# Patient Record
Sex: MALE | Race: WHITE | Employment: FULL TIME | URBAN - METROPOLITAN AREA
[De-identification: names, ages, dates, MRNs, and addresses within clinical notes are randomized per-mention and may not be internally consistent; named-entity substitution may affect disease eponyms.]

---

## 2017-09-14 PROBLEM — I10 POORLY-CONTROLLED HYPERTENSION: Status: ACTIVE | Noted: 2017-09-14

## 2017-09-15 PROBLEM — L03.119 CELLULITIS OF FOOT: Status: ACTIVE | Noted: 2017-09-15

## 2017-09-20 PROBLEM — Z72.0 TOBACCO ABUSE: Status: ACTIVE | Noted: 2017-09-20

## 2017-09-20 PROBLEM — E66.9 OBESITY, UNSPECIFIED: Status: ACTIVE | Noted: 2017-09-20

## 2017-11-07 PROBLEM — I10 ESSENTIAL HYPERTENSION: Status: ACTIVE | Noted: 2017-11-07

## 2018-01-03 PROBLEM — L03.119 CELLULITIS OF FOOT: Status: RESOLVED | Noted: 2017-09-15 | Resolved: 2018-01-03

## 2018-09-05 ENCOUNTER — HOSPITAL ENCOUNTER (INPATIENT)
Age: 58
LOS: 6 days | Discharge: HOME | DRG: 872 | End: 2018-09-11
Attending: EMERGENCY MEDICINE | Admitting: INTERNAL MEDICINE
Payer: COMMERCIAL

## 2018-09-05 ENCOUNTER — APPOINTMENT (OUTPATIENT)
Dept: GENERAL RADIOLOGY | Age: 58
DRG: 872 | End: 2018-09-05
Attending: EMERGENCY MEDICINE
Payer: COMMERCIAL

## 2018-09-05 ENCOUNTER — APPOINTMENT (OUTPATIENT)
Dept: GENERAL RADIOLOGY | Age: 58
DRG: 872 | End: 2018-09-05
Attending: INTERNAL MEDICINE
Payer: COMMERCIAL

## 2018-09-05 DIAGNOSIS — I48.91 ATRIAL FIBRILLATION WITH RVR (HCC): Primary | ICD-10-CM

## 2018-09-05 PROBLEM — L88 PYODERMA GANGRENOSUM: Chronic | Status: ACTIVE | Noted: 2018-09-05

## 2018-09-05 PROBLEM — E87.6 HYPOKALEMIA: Status: ACTIVE | Noted: 2018-09-05

## 2018-09-05 PROBLEM — L03.119 CELLULITIS OF FOOT: Chronic | Status: ACTIVE | Noted: 2018-09-05

## 2018-09-05 PROBLEM — E83.42 HYPOMAGNESEMIA: Status: ACTIVE | Noted: 2018-09-05

## 2018-09-05 PROBLEM — Z79.52 CURRENT CHRONIC USE OF SYSTEMIC STEROIDS: Chronic | Status: ACTIVE | Noted: 2018-09-05

## 2018-09-05 PROBLEM — N18.9 CKD (CHRONIC KIDNEY DISEASE): Chronic | Status: ACTIVE | Noted: 2018-09-05

## 2018-09-05 LAB
ALBUMIN SERPL-MCNC: 3.6 G/DL (ref 3.5–5)
ALBUMIN/GLOB SERPL: 0.9 {RATIO} (ref 1.2–3.5)
ALP SERPL-CCNC: 73 U/L (ref 50–136)
ALT SERPL-CCNC: 38 U/L (ref 12–65)
ANION GAP SERPL CALC-SCNC: 10 MMOL/L (ref 7–16)
AST SERPL-CCNC: 19 U/L (ref 15–37)
BACTERIA URNS QL MICRO: 0 /HPF
BASOPHILS # BLD: 0 K/UL (ref 0–0.2)
BASOPHILS NFR BLD: 0 % (ref 0–2)
BILIRUB SERPL-MCNC: 0.3 MG/DL (ref 0.2–1.1)
BUN SERPL-MCNC: 25 MG/DL (ref 6–23)
CALCIUM SERPL-MCNC: 9.4 MG/DL (ref 8.3–10.4)
CASTS URNS QL MICRO: NORMAL /LPF
CHLORIDE SERPL-SCNC: 102 MMOL/L (ref 98–107)
CK SERPL-CCNC: 57 U/L (ref 21–215)
CO2 SERPL-SCNC: 27 MMOL/L (ref 21–32)
CREAT SERPL-MCNC: 1.99 MG/DL (ref 0.8–1.5)
DIFFERENTIAL METHOD BLD: ABNORMAL
EOSINOPHIL # BLD: 0.1 K/UL (ref 0–0.8)
EOSINOPHIL NFR BLD: 0 % (ref 0.5–7.8)
EPI CELLS #/AREA URNS HPF: NORMAL /HPF
ERYTHROCYTE [DISTWIDTH] IN BLOOD BY AUTOMATED COUNT: 14.3 %
GLOBULIN SER CALC-MCNC: 3.9 G/DL (ref 2.3–3.5)
GLUCOSE SERPL-MCNC: 118 MG/DL (ref 65–100)
HCT VFR BLD AUTO: 43.5 % (ref 41.1–50.3)
HGB BLD-MCNC: 14.1 G/DL (ref 13.6–17.2)
IMM GRANULOCYTES # BLD: 0.2 K/UL (ref 0–0.5)
IMM GRANULOCYTES NFR BLD AUTO: 1 % (ref 0–5)
LACTATE BLD-SCNC: 3.5 MMOL/L (ref 0.5–1.9)
LYMPHOCYTES # BLD: 6 K/UL (ref 0.5–4.6)
LYMPHOCYTES NFR BLD: 28 % (ref 13–44)
MAGNESIUM SERPL-MCNC: 1.5 MG/DL (ref 1.8–2.4)
MCH RBC QN AUTO: 28.7 PG (ref 26.1–32.9)
MCHC RBC AUTO-ENTMCNC: 32.4 G/DL (ref 31.4–35)
MCV RBC AUTO: 88.6 FL (ref 79.6–97.8)
MONOCYTES # BLD: 1.4 K/UL (ref 0.1–1.3)
MONOCYTES NFR BLD: 7 % (ref 4–12)
NEUTS SEG # BLD: 13.9 K/UL (ref 1.7–8.2)
NEUTS SEG NFR BLD: 64 % (ref 43–78)
NRBC # BLD: 0 K/UL (ref 0–0.2)
PLATELET # BLD AUTO: 457 K/UL (ref 150–450)
PMV BLD AUTO: 9 FL (ref 9.4–12.3)
POTASSIUM SERPL-SCNC: 3.3 MMOL/L (ref 3.5–5.1)
PROCALCITONIN SERPL-MCNC: 0.1 NG/ML
PROT SERPL-MCNC: 7.5 G/DL (ref 6.3–8.2)
RBC # BLD AUTO: 4.91 M/UL (ref 4.23–5.6)
RBC #/AREA URNS HPF: NORMAL /HPF
SODIUM SERPL-SCNC: 139 MMOL/L (ref 136–145)
TROPONIN I BLD-MCNC: 0.05 NG/ML (ref 0.02–0.05)
TSH SERPL DL<=0.005 MIU/L-ACNC: 2.9 UIU/ML (ref 0.36–3.74)
WBC # BLD AUTO: 21.6 K/UL (ref 4.3–11.1)
WBC URNS QL MICRO: NORMAL /HPF

## 2018-09-05 PROCEDURE — 83605 ASSAY OF LACTIC ACID: CPT

## 2018-09-05 PROCEDURE — 74011000258 HC RX REV CODE- 258: Performed by: EMERGENCY MEDICINE

## 2018-09-05 PROCEDURE — 84443 ASSAY THYROID STIM HORMONE: CPT

## 2018-09-05 PROCEDURE — 93005 ELECTROCARDIOGRAM TRACING: CPT | Performed by: EMERGENCY MEDICINE

## 2018-09-05 PROCEDURE — 84145 PROCALCITONIN (PCT): CPT

## 2018-09-05 PROCEDURE — 84484 ASSAY OF TROPONIN QUANT: CPT

## 2018-09-05 PROCEDURE — 71045 X-RAY EXAM CHEST 1 VIEW: CPT

## 2018-09-05 PROCEDURE — 82550 ASSAY OF CK (CPK): CPT

## 2018-09-05 PROCEDURE — 65660000000 HC RM CCU STEPDOWN

## 2018-09-05 PROCEDURE — 74011250636 HC RX REV CODE- 250/636: Performed by: EMERGENCY MEDICINE

## 2018-09-05 PROCEDURE — 80053 COMPREHEN METABOLIC PANEL: CPT

## 2018-09-05 PROCEDURE — 81015 MICROSCOPIC EXAM OF URINE: CPT

## 2018-09-05 PROCEDURE — 96365 THER/PROPH/DIAG IV INF INIT: CPT | Performed by: EMERGENCY MEDICINE

## 2018-09-05 PROCEDURE — 99285 EMERGENCY DEPT VISIT HI MDM: CPT | Performed by: EMERGENCY MEDICINE

## 2018-09-05 PROCEDURE — 74011000250 HC RX REV CODE- 250: Performed by: EMERGENCY MEDICINE

## 2018-09-05 PROCEDURE — 87086 URINE CULTURE/COLONY COUNT: CPT

## 2018-09-05 PROCEDURE — 96375 TX/PRO/DX INJ NEW DRUG ADDON: CPT | Performed by: EMERGENCY MEDICINE

## 2018-09-05 PROCEDURE — 85025 COMPLETE CBC W/AUTO DIFF WBC: CPT

## 2018-09-05 PROCEDURE — 83735 ASSAY OF MAGNESIUM: CPT

## 2018-09-05 PROCEDURE — 74011250636 HC RX REV CODE- 250/636: Performed by: INTERNAL MEDICINE

## 2018-09-05 PROCEDURE — 73630 X-RAY EXAM OF FOOT: CPT

## 2018-09-05 PROCEDURE — 77030020263 HC SOL INJ SOD CL0.9% LFCR 1000ML

## 2018-09-05 RX ORDER — DILTIAZEM HYDROCHLORIDE 5 MG/ML
20 INJECTION INTRAVENOUS ONCE
Status: COMPLETED | OUTPATIENT
Start: 2018-09-05 | End: 2018-09-05

## 2018-09-05 RX ORDER — METOPROLOL SUCCINATE 100 MG/1
100 TABLET, EXTENDED RELEASE ORAL DAILY
COMMUNITY
End: 2018-12-05 | Stop reason: SDUPTHER

## 2018-09-05 RX ORDER — POTASSIUM CHLORIDE 20 MEQ/1
20 TABLET, EXTENDED RELEASE ORAL
Status: COMPLETED | OUTPATIENT
Start: 2018-09-05 | End: 2018-09-06

## 2018-09-05 RX ORDER — SODIUM CHLORIDE 0.9 % (FLUSH) 0.9 %
5-10 SYRINGE (ML) INJECTION AS NEEDED
Status: DISCONTINUED | OUTPATIENT
Start: 2018-09-05 | End: 2018-09-11 | Stop reason: HOSPADM

## 2018-09-05 RX ORDER — HEPARIN SODIUM 5000 [USP'U]/100ML
12-25 INJECTION, SOLUTION INTRAVENOUS
Status: DISCONTINUED | OUTPATIENT
Start: 2018-09-05 | End: 2018-09-10

## 2018-09-05 RX ORDER — DILTIAZEM HYDROCHLORIDE 5 MG/ML
25 INJECTION INTRAVENOUS ONCE
Status: COMPLETED | OUTPATIENT
Start: 2018-09-05 | End: 2018-09-05

## 2018-09-05 RX ORDER — MAGNESIUM SULFATE HEPTAHYDRATE 40 MG/ML
2 INJECTION, SOLUTION INTRAVENOUS ONCE
Status: COMPLETED | OUTPATIENT
Start: 2018-09-05 | End: 2018-09-06

## 2018-09-05 RX ORDER — SODIUM CHLORIDE 9 MG/ML
125 INJECTION, SOLUTION INTRAVENOUS CONTINUOUS
Status: DISCONTINUED | OUTPATIENT
Start: 2018-09-05 | End: 2018-09-08

## 2018-09-05 RX ADMIN — DILTIAZEM HYDROCHLORIDE 25 MG: 5 INJECTION INTRAVENOUS at 20:13

## 2018-09-05 RX ADMIN — SODIUM CHLORIDE 5 MG/HR: 900 INJECTION, SOLUTION INTRAVENOUS at 20:57

## 2018-09-05 RX ADMIN — DILTIAZEM HYDROCHLORIDE 20 MG: 5 INJECTION INTRAVENOUS at 20:00

## 2018-09-05 RX ADMIN — SODIUM CHLORIDE 1000 ML: 900 INJECTION, SOLUTION INTRAVENOUS at 23:51

## 2018-09-05 NOTE — IP AVS SNAPSHOT
Christie Malik 
 
 
 [address] 
[phone] Patient: Yossi Kenyon MRN: [id number] NJA:[id number] About your hospitalization You were admitted on:  September 5, 2018 You last received care in the:  [facility] 3 TELEMETRY You were discharged on:  September 11, 2018 Why you were hospitalized Your primary diagnosis was:  Cellulitis Of Foot Your diagnoses also included:  Atrial Fibrillation With Rapid Ventricular Response (Hcc), Essential Hypertension, Tobacco Abuse, Obesity, Unspecified, Pyoderma Gangrenosum, Hypokalemia, Hypomagnesemia, Ckd (Chronic Kidney Disease), Current Chronic Use Of Systemic Steroids Follow-up Information Follow up With Details Comments Contact Info Michelle Pack MD On 9/25/2018 at 115pm in [place] office  [facility] [address] [facility] [address] 
[phone] John Hollingsworth MD  Office will call you with appointment within 1 week [address]  
[address] [facility] [address] 
[phone] Danika Olivo DPM Schedule an appointment as soon as possible for a visit  5 [facility] [employer] [address] [place] [address] 
[phone] Your Scheduled Appointments Tuesday September 25, 2018  1:15 PM EDT HOSPITAL FOLLOW-UP with Michelle Pack MD  
[address] ([address] [place] [address]) 2 Andrei Mike 400 [facility] 81  
[phone] Discharge Orders None A check jillian indicates which time of day the medication should be taken. My Medications START taking these medications Instructions Each Dose to Equal  
 Morning Noon Evening Bedtime  
 amiodarone 400 mg tablet Commonly known as:  Stacy [place] Take 1 Tab by mouth two (2) times a day. Indications: Cardioversion of Atrial Fibrillation 400 mg  
    
  
   
   
  
   
  
 apixaban 5 mg tablet Commonly known as:  Sangeeta Wesley Take 1 Tab by mouth every twelve (12) hours. Indications: Cerebral Thromboembolism Prevention 5 mg  
    
  
   
   
  
   
  
 cephALEXin 500 mg capsule Commonly known as:  Jayla Delmer Take 1 Cap by mouth every six (6) hours for 3 days. Indications: Skin and Skin Structure Infection 500 mg  
    
  
   
  
   
  
   
  
  
 clotrimazole 1 % topical cream  
Commonly known as:  Ann Davon Apply  to affected area two (2) times a day. CONTINUE taking these medications Instructions Each Dose to Equal  
 Morning Noon Evening Bedtime BIONECT 0.2 % topical cream  
Generic drug:  sodium hyaluronate  
   
 apply to affected area on lt. foot twice. metoprolol succinate 100 mg tablet Commonly known as:  TOPROL-XL Take 100 mg by mouth daily. 100 mg Olmesartan-amLODIPine-HCTZ 40-10-25 mg Tab Commonly known as:  [employer] American Take 1 Tab by mouth daily. 1 Tab PERCOCET 5-325 mg per tablet Generic drug:  oxyCODONE-acetaminophen  
   
 take 1 tablet by oral route  every 6 hours as needed FOR PAIN  
     
   
   
   
  
 predniSONE 50 mg tablet Commonly known as:  Kadeem Razor Take 1 Tab by mouth. 1 Tab  
    
   
   
   
  
 sildenafil citrate 100 mg tablet Commonly known as:  VIAGRA Take 1 Tab by mouth as needed. 100 mg Where to Get Your Medications Information on where to get these meds will be given to you by the nurse or doctor. ! Ask your nurse or doctor about these medications  
  amiodarone 400 mg tablet  
 apixaban 5 mg tablet  
 cephALEXin 500 mg capsule  
 clotrimazole 1 % topical cream  
  
  
  
  
Opioid Education  Prescription Opioids: What You Need to Know: 
 
Prescription opioids can be used to help relieve moderate-to-severe pain and are often prescribed following a surgery or injury, or for certain health conditions. These medications can be an important part of treatment but also come with serious risks. Opioids are strong pain medicines. Examples include hydrocodone, oxycodone, fentanyl, and morphine. Heroin is an example of an illegal opioid. It is important to work with your health care provider to make sure you are getting the safest, most effective care. WHAT ARE THE RISKS AND SIDE EFFECTS OF OPIOID USE? Prescription opioids carry serious risks of addiction and overdose, especially with prolonged use. An opioid overdose, often marked by slow breathing, can cause sudden death. The use of prescription opioids can have a number of side effects as well, even when taken as directed. · Tolerance-meaning you might need to take more of a medication for the same pain relief · Physical dependence-meaning you have symptoms of withdrawal when the medication is stopped. Withdrawal symptoms can include nausea, sweating, chills, diarrhea, stomach cramps, and muscle aches. Withdrawal can last up to several weeks, depending on which drug you took and how long you took it. · Increased sensitivity to pain · Constipation · Nausea, vomiting, and dry mouth · Sleepiness and dizziness · Confusion · Depression · Low levels of testosterone that can result in lower sex drive, energy, and strength · Itching and sweating RISKS ARE GREATER WITH:      
· History of drug misuse, substance use disorder, or overdose · Mental health conditions (such as depression or anxiety) · Sleep apnea · Older age (72 years or older) · Pregnancy Avoid alcohol while taking prescription opioids. Also, unless specifically advised by your health care provider, medications to avoid include: · Benzodiazepines (such as Xanax or Valium) · Muscle relaxants (such as Soma or Flexeril) · Hypnotics (such as Ambien or Lunesta) · Other prescription opioids KNOW YOUR OPTIONS Talk to your health care provider about ways to manage your pain that don't involve prescription opioids. Some of these options may actually work better and have fewer risks and side effects. Options may include: 
· Pain relievers such as acetaminophen, ibuprofen, and naproxen · Some medications that are also used for depression or seizures · Physical therapy and exercise · Counseling to help patients learn how to cope better with triggers of pain and stress. · Application of heat or cold compress · Massage therapy · Relaxation techniques Be Informed Make sure you know the name of your medication, how much and how often to take it, and its potential risks & side effects. IF YOU ARE PRESCRIBED OPIOIDS FOR PAIN: 
· Never take opioids in greater amounts or more often than prescribed. Remember the goal is not to be pain-free but to manage your pain at a tolerable level. · Follow up with your primary care provider to: · Work together to create a plan on how to manage your pain. · Talk about ways to help manage your pain that don't involve prescription opioids. · Talk about any and all concerns and side effects. · Help prevent misuse and abuse. · Never sell or share prescription opioids · Help prevent misuse and abuse. · Store prescription opioids in a secure place and out of reach of others (this may include visitors, children, friends, and family). · Safely dispose of unused/unwanted prescription opioids: Find your community drug take-back program or your pharmacy mail-back program, or flush them down the toilet, following guidance from the Food and Drug Administration (www.fda.gov/Drugs/ResourcesForYou). · Visit www.cdc.gov/drugoverdose to learn about the risks of opioid abuse and overdose. · If you believe you may be struggling with addiction, tell your health care provider and ask for guidance or call [employer] at 5-137-844-RBNT. Discharge Instructions Electrical Cardioversion: What to Expect at [facility] Your Recovery Electrical cardioversion is a treatment for an abnormal heartbeat, such as atrial fibrillation, supraventricular tachycardia, or ventricular tachycardia (VT). It uses a brief electrical shock to reset your heart's rhythm. After cardioversion, you may have redness, like a sunburn, where the patches were. The medicines you got to make you sleepy may make you feel drowsy for the rest of the day. Your doctor may have you take medicines to help the heart beat normally and to prevent blood clots. This care sheet gives you a general idea about how long it will take for you to recover. But each person recovers at a different pace. Follow the steps below to feel better as quickly as possible. How can you care for yourself at home? Medicines 
  · Be safe with medicines. Take your medicines exactly as prescribed. Call your doctor if you think you are having a problem with your medicine. You may take one or more of the following medicines: 
¨ Rate-control medicines to slow the heart rate. These include beta-blockers, calcium channel blockers, and digoxin. ¨ Rhythm control medicines that help the heart keep a normal rhythm. ¨ Blood thinners, also called anticoagulants, which help prevent blood clots. You will get more details on the specific medicines your doctor prescribes. Be sure you know how to take your medicines safely.  
  · Do not take any vitamins, over-the-counter medicines, or herbal products without talking to your doctor first.  
Exercise 
  · Start light exercise if your doctor says that it is okay. Even a small amount will help you get stronger, have more energy, and manage your stress. Walking is an easy way to get exercise. Start out by walking a little more than you did in the hospital. Bit by bit, increase the amount you walk.   · When you exercise, watch for signs that your heart is working too hard. You are pushing too hard if you cannot talk while you are exercising. If you become short of breath or dizzy or have chest pain, sit down and rest right away.  
  · Check your pulse regularly. Place two fingers on the artery at the palm side of your wrist in line with your thumb. If your heartbeat seems uneven or fast, talk to your doctor. Other instructions 
  · Ask your doctor when you can drive again.  
  · Do not smoke. If you need help quitting, talk to your doctor about stop-smoking programs and medicines. These can increase your chances of quitting for good.  
  · Limit alcohol. Follow-up care is a key part of your treatment and safety. Be sure to make and go to all appointments, and call your doctor if you are having problems. It's also a good idea to know your test results and keep a list of the medicines you take. When should you call for help? Call 911 anytime you think you may need emergency care. For example, call if: 
  · You passed out (lost consciousness).  
  · You have chest pain or pressure. This may occur with: ¨ Sweating. ¨ Shortness of breath. ¨ Nausea or vomiting. ¨ Pain that spreads from the chest to the neck, jaw, or one or both shoulders or arms. ¨ A fast or uneven pulse. After calling 911, the  may tell you to chew 1 adult-strength or 2 to 4 low-dose aspirin. Wait for an ambulance. Do not try to drive yourself.  
  · You have symptoms of a stroke. These may include: 
¨ Sudden numbness, tingling, weakness, or loss of movement in your face, arm, or leg, especially on sejal side of your body. ¨ Sudden vision changes. ¨ Sudden trouble speaking. ¨ Sudden confusion or trouble understanding simple statements. ¨ Sudden problems with walking or balance. ¨ A sudden, severe headache that is different from past headaches.  
 Call your doctor now or seek immediate medical care if:   · You feel dizzy or lightheaded, or you feel like you may faint.  
  · You have a fast or irregular heartbeat.  
 Watch closely for any changes in your health, and be sure to contact your doctor if you have any problems. Where can you learn more? Go to http://yaneli-ifrah.info/. Enter A617 in the search box to learn more about \"Electrical Cardioversion: What to Expect at Home. \" Current as of: December 6, 2017 Content Version: 11.7 © 0224-9366 Possible Web. Care instructions adapted under license by [employer] (which disclaims liability or warranty for this information). If you have questions about a medical condition or this instruction, always ask your healthcare professional. [address] any warranty or liability for your use of this information. Learning About Benefits From Quitting Smoking How does quitting smoking make you healthier? If you're thinking about quitting smoking, you may have a few reasons to be smoke-free. Your health may be one of them. · When you quit smoking, you lower your risks for cancer, lung disease, heart attack, stroke, blood vessel disease, and blindness from macular degeneration. · When you're smoke-free, you get sick less often, and you heal faster. You are less likely to get colds, flu, bronchitis, and pneumonia. · As a nonsmoker, you may find that your mood is better and you are less stressed. When and how will you feel healthier? Quitting has real health benefits that start from day 1 of being smoke-free. And the longer you stay smoke-free, the healthier you get and the better you feel. The first hours · After just 20 minutes, your blood pressure and heart rate go down. That means there's less stress on your heart and blood vessels. · Within 12 hours, the level of carbon monoxide in your blood drops back to normal. That makes room for more oxygen.  With more oxygen in your body, you may notice that you have more energy than when you smoked. After 2 weeks · Your lungs start to work better. · Your risk of heart attack starts to drop. After 1 month · When your lungs are clear, you cough less and breathe deeper, so it's easier to be active. · Your sense of taste and smell return. That means you can enjoy food more than you have since you started smoking. Over the years · After 1 year, your risk of heart disease is half what it would be if you kept smoking. · After 5 years, your risk of stroke starts to shrink. Within a few years after that, it's about the same as if you'd never smoked. · After 10 years, your risk of dying from lung cancer is cut by about half. And your risk for many other types of cancer is lower too. How would quitting help others in your life? When you quit smoking, you improve the health of everyone who now breathes in your smoke. · Their heart, lung, and cancer risks drop, much like yours. · They are sick less. For babies and small children, living smoke-free means they're less likely to have ear infections, pneumonia, and bronchitis. · If you're a woman who is or will be pregnant someday, quitting smoking means a healthier . · Children who are close to you are less likely to become adult smokers. Where can you learn more? Go to http://yaneli-ifrah.info/. Enter 052 806 72 11 in the search box to learn more about \"Learning About Benefits From Quitting Smoking. \" Current as of: 2017 Content Version: 11.7 © 5817-3885 [employer]. Care instructions adapted under license by [employer] (which disclaims liability or warranty for this information). If you have questions about a medical condition or this instruction, always ask your healthcare professional. [place] [address] any warranty or liability for your use of this information. [employer] Announcement We are excited to announce that we are making your provider's discharge notes available to you in CardioDxt. You will see these notes when they are completed and signed by the physician that discharged you from your recent hospital stay. If you have any questions or concerns about any information you see in oncgnostics GmbHhart, please call the Health Information Department where you were seen or reach out to your Primary Care Provider for more information about your plan of care. Introducing Alber Carvajal As a [employer] patient, I wanted to make you aware of our electronic visit tool called Alber Carvajal. [employer] 24/7 allows you to connect within minutes with a medical provider 24 hours a day, seven days a week via a mobile device or tablet or logging into a secure website from your computer. You can access Alber Carvajal from anywhere in the [place]. A virtual visit might be right for you when you have a simple condition and feel like you just dont want to get out of bed, or cant get away from work for an appointment, when your regular [employer] provider is not available (evenings, weekends or holidays), or when youre out of town and need minor care. Electronic visits cost only $49 and if the [employer] 24/7 provider determines a prescription is needed to treat your condition, one can be electronically transmitted to a nearby pharmacy*. Please take a moment to enroll today if you have not already done so. The enrollment process is free and takes just a few minutes. To enroll, please download the [employer] 24/7 hayley to your tablet or phone, or visit [email or web address] to enroll on your computer. And, as an [address] [place] [address] patient with a [employer] account, the results of your visits will be scanned into your electronic medical record and your primary care provider will be able to view the scanned results. We urge you to continue to see your regular [employer] provider for your ongoing medical care. And while your primary care provider may not be the one available when you seek a [employer] virtual visit, the peace of mind you get from getting a real diagnosis real time can be priceless. For more information on [employer], view our Frequently Asked Questions (FAQs) at [email or web address]. Sincerely, 
 
Juaquin Mcdermott MD 
Chief Medical Officer [address] *:  certain medications cannot be prescribed via [employer] Providers Seen During Your Hospitalization Provider Specialty Primary office phone Piero Gamez MD Emergency Medicine [phone] Fabian Vargas MD Internal Medicine [phone] Anibal Pena MD Family Practice [phone] Your Primary Care Physician (PCP) Primary Care Physician Office Phone Office Fax Homero Enriquez [phone] You are allergic to the following No active allergies Recent Documentation Height Weight BMI Smoking Status 1.854 m 113.4 kg 32.97 kg/m2 Current Every Day Smoker Emergency Contacts Name Discharge Info Relation Home Work Mobile Clay Stager [22] [phone] Tiff Retana  Sister [23] [phone] Patient Belongings The following personal items are in your possession at time of discharge: 
  Dental Appliances: Uppers, Lowers, With patient  Visual Aid: Glasses      Home Medications: Sent home   Jewelry: None  Clothing: At bedside, [place] (RTP), Footwear, Pants, Shirt, Socks, Undergarments, Other (comment) (Suspenders)    Other Valuables: At bedside, Cell Phone, Kaylin Ferguson (comment), Attunity Please provide this summary of care documentation to your next provider.  
  
  
 
  
Signatures-by signing, you are acknowledging that this After Visit Summary has been reviewed with you and you have received a copy. Patient Signature:  ____________________________________________________________ Date:  ____________________________________________________________  
  
Earnstine Juan Provider Signature:  ____________________________________________________________ Date:  ____________________________________________________________

## 2018-09-05 NOTE — ED TRIAGE NOTES
Pt arrives to ED via POV. States he feels weak but denies any chest pain or SOB. States has passed out a couple times on Saturday. Saw his PCP today and was told to come straight here after they did EKG. Brought EKG with him from doctor and presents as possible a fib at rate of 189 bpm. 
 
Hx HTN, denies any other cardiac hx.

## 2018-09-05 NOTE — IP AVS SNAPSHOT
[address] 322 [facility] 
[phone] Patient: Rebecca Coreas MRN: [id number] YXO:[birth year] A check jillian indicates which time of day the medication should be taken. My Medications START taking these medications Instructions Each Dose to Equal  
 Morning Noon Evening Bedtime  
 amiodarone 400 mg tablet Commonly known as:  Pepper Dukes Take 1 Tab by mouth two (2) times a day. Indications: Cardioversion of Atrial Fibrillation 400 mg  
    
  
   
   
  
   
  
 apixaban 5 mg tablet Commonly known as:  Randy Smith Take 1 Tab by mouth every twelve (12) hours. Indications: Cerebral Thromboembolism Prevention 5 mg  
    
  
   
   
  
   
  
 cephALEXin 500 mg capsule Commonly known as:  [place] Other Take 1 Cap by mouth every six (6) hours for 3 days. Indications: Skin and Skin Structure Infection 500 mg  
    
  
   
  
   
  
   
  
  
 clotrimazole 1 % topical cream  
Commonly known as:  [place]y  Apply  to affected area two (2) times a day. CONTINUE taking these medications Instructions Each Dose to Equal  
 Morning Noon Evening Bedtime BIONECT 0.2 % topical cream  
Generic drug:  sodium hyaluronate  
   
 apply to affected area on lt. foot twice. metoprolol succinate 100 mg tablet Commonly known as:  TOPROL-XL Take 100 mg by mouth daily. 100 mg Olmesartan-amLODIPine-HCTZ 40-10-25 mg Tab Commonly known as:  The First American Take 1 Tab by mouth daily. 1 Tab PERCOCET 5-325 mg per tablet Generic drug:  oxyCODONE-acetaminophen  
   
 take 1 tablet by oral route  every 6 hours as needed FOR PAIN  
     
   
   
   
  
 predniSONE 50 mg tablet Commonly known as:  Reinaldo Dulce Take 1 Tab by mouth. 1 Tab  
    
   
   
   
  
 sildenafil citrate 100 mg tablet Commonly known as:  VIAGRA Take 1 Tab by mouth as needed. 100 mg Where to Get Your Medications Information on where to get these meds will be given to you by the nurse or doctor. !  Ask your nurse or doctor about these medications  
  amiodarone 400 mg tablet  
 apixaban 5 mg tablet  
 cephALEXin 500 mg capsule  
 clotrimazole 1 % topical cream

## 2018-09-05 NOTE — ED PROVIDER NOTES
HPI Comments: 61-year-old male with history of hypertension presents with complaint of palpitations that are present over the past several days. Patient states that he had syncopal episode over the weekend. States that he has been laying in bed and not feeling well since. Patient denies chest pain, shortness of breath, nausea, vomiting, fever, chills, abdominal pain. Patient denies history of A. Fib. Patient is a 59 y.o. male presenting with rapid heart beat. The history is provided by the patient. No  was used. Rapid Heart Rate This is a new problem. The current episode started more than 2 days ago. The problem occurs constantly. The problem has not changed since onset. Pertinent negatives include no chest pain, no abdominal pain, no headaches and no shortness of breath. Nothing aggravates the symptoms. Nothing relieves the symptoms. He has tried nothing for the symptoms. The treatment provided no relief. Past Medical History:  
Diagnosis Date  Hypertension 2007 History reviewed. No pertinent surgical history. Family History:  
Problem Relation Age of Onset Mindy Arthritis-osteo Mother  Asthma Mother  Cancer Mother  Hypertension Mother  Stroke Father  Arthritis-osteo Maternal Grandmother  Arthritis-osteo Maternal Grandfather  Hypertension Maternal Grandfather  Stroke Maternal Grandfather  Arthritis-osteo Paternal Grandmother  Diabetes Paternal Grandmother  Arthritis-osteo Paternal Grandfather  Heart Disease Paternal Grandfather  Stroke Paternal Grandfather Social History Social History  Marital status:  Spouse name: N/A  
 Number of children: N/A  
 Years of education: N/A Occupational History  Not on file. Social History Main Topics  Smoking status: Current Every Day Smoker Types: Cigarettes  Smokeless tobacco: Never Used  Alcohol use 0.6 oz/week 1 Glasses of wine per week  Drug use: No  
 Sexual activity: Yes  
  Partners: Female Birth control/ protection: None Other Topics Concern  Not on file Social History Narrative ALLERGIES: Review of patient's allergies indicates no known allergies. Review of Systems Constitutional: Negative for chills and fever. HENT: Negative for congestion, facial swelling and sore throat. Eyes: Negative for visual disturbance. Respiratory: Negative for cough and shortness of breath. Cardiovascular: Positive for palpitations. Negative for chest pain and leg swelling. Gastrointestinal: Negative for abdominal pain, blood in stool, nausea and vomiting. Genitourinary: Negative for dysuria and hematuria. Musculoskeletal: Negative for back pain, joint swelling, myalgias and neck pain. Skin: Negative for pallor and wound. Neurological: Negative for dizziness, weakness, numbness and headaches. Psychiatric/Behavioral: Negative for agitation and confusion. Vitals:  
 09/05/18 1939 09/05/18 1954 BP: 139/90 Pulse: (!) 138 (!) 190 Resp: 20 Temp: 98.1 °F (36.7 °C) SpO2: 98% Weight: 112 kg (247 lb) Physical Exam  
Constitutional: He is oriented to person, place, and time. Patient well-appearing in no acute distress. HENT:  
Head: Normocephalic. MMM. Neck: Normal range of motion. No JVD present. No tracheal deviation present. Cardiovascular: Normal heart sounds and intact distal pulses. Tachycardic. Irregularly irregular. Radial pulses 2+ and equal bilaterally. Pulmonary/Chest: Effort normal and breath sounds normal. He has no wheezes. He has no rales. CTAB. No chest wall TTP. Abdominal: Soft. Soft, NTND. No rebound or guarding. No CVAT. Musculoskeletal: Normal range of motion. He exhibits no tenderness. No LE edema. No calf TTP. Neurological: He is alert and oriented to person, place, and time.  No cranial nerve deficit. Coordination normal.  
Skin: Skin is warm and dry. No rash. Psychiatric: He has a normal mood and affect. Judgment normal.  
Nursing note and vitals reviewed. MDM Number of Diagnoses or Management Options Atrial fibrillation with RVR [facility]): new and requires workup Diagnosis management comments: Patient given diltiazem 20mg and 25 mg IV. No significant improvement and rate. Patient started on Cardizem drip Cardiology consulted. Recommend Cardizem drip. State that with elevated white blood cell count and elevated lactic acid concern for underlying sepsis. States that the hospitalist be consulted for admission and they will follow along. No source at this time for underlying infectious process. Afebrile. Chest x-ray clear. Hospitalist consulted for admission. Amount and/or Complexity of Data Reviewed Clinical lab tests: ordered and reviewed Tests in the radiology section of CPT®: ordered and reviewed Tests in the medicine section of CPT®: ordered and reviewed Discuss the patient with other providers: yes Independent visualization of images, tracings, or specimens: yes Risk of Complications, Morbidity, and/or Mortality Presenting problems: high Diagnostic procedures: high Management options: high Patient Progress Patient progress: other (comment) ED Course Comment By Time CXR IMPRESSION: Normal chest. Shlomo Payton MD 09/05 1325 EKG Date/Time: 9/5/2018 7:58 PM 
Performed by: Rosalino Bowman Authorized by: SHLOMO Deras  
 
ECG reviewed by ED Physician in the absence of a cardiologist: yes Rate:  
  ECG rate:  187 ECG rate assessment: tachycardic Rhythm:  
  Rhythm: atrial fibrillation Ectopy:  
  Ectopy: none QRS:  
  QRS axis:  Normal 
  QRS intervals:  Normal 
Conduction:  
  Conduction: normal   
ST segments:   ST segments:  Normal 
T waves:  
  T waves: normal   
 
 
 Results Include: 
 
Recent Results (from the past 24 hour(s)) EKG, 12 LEAD, INITIAL Collection Time: 09/05/18  7:40 PM  
Result Value Ref Range Ventricular Rate 187 BPM  
 Atrial Rate 258 BPM  
 QRS Duration 96 ms  
 Q-T Interval 262 ms QTC Calculation (Bezet) 462 ms Calculated R Axis 44 degrees Calculated T Axis -125 degrees Diagnosis Atrial fibrillation with rapid ventricular response Marked ST abnormality, possible inferior subendocardial injury Abnormal ECG No previous ECGs available CBC WITH AUTOMATED DIFF Collection Time: 09/05/18  7:50 PM  
Result Value Ref Range WBC 21.6 (H) 4.3 - 11.1 K/uL  
 RBC 4.91 4.23 - 5.6 M/uL  
 HGB 14.1 13.6 - 17.2 g/dL HCT 43.5 41.1 - 50.3 % MCV 88.6 79.6 - 97.8 FL  
 MCH 28.7 26.1 - 32.9 PG  
 MCHC 32.4 31.4 - 35.0 g/dL  
 RDW 14.3 % PLATELET 354 (H) 241 - 450 K/uL MPV 9.0 (L) 9.4 - 12.3 FL ABSOLUTE NRBC 0.00 0.0 - 0.2 K/uL  
 DF AUTOMATED NEUTROPHILS 64 43 - 78 % LYMPHOCYTES 28 13 - 44 % MONOCYTES 7 4.0 - 12.0 % EOSINOPHILS 0 (L) 0.5 - 7.8 % BASOPHILS 0 0.0 - 2.0 % IMMATURE GRANULOCYTES 1 0.0 - 5.0 %  
 ABS. NEUTROPHILS 13.9 (H) 1.7 - 8.2 K/UL  
 ABS. LYMPHOCYTES 6.0 (H) 0.5 - 4.6 K/UL  
 ABS. MONOCYTES 1.4 (H) 0.1 - 1.3 K/UL  
 ABS. EOSINOPHILS 0.1 0.0 - 0.8 K/UL  
 ABS. BASOPHILS 0.0 0.0 - 0.2 K/UL  
 ABS. IMM. GRANS. 0.2 0.0 - 0.5 K/UL METABOLIC PANEL, COMPREHENSIVE Collection Time: 09/05/18  7:50 PM  
Result Value Ref Range Sodium 139 136 - 145 mmol/L Potassium 3.3 (L) 3.5 - 5.1 mmol/L Chloride 102 98 - 107 mmol/L  
 CO2 27 21 - 32 mmol/L Anion gap 10 7 - 16 mmol/L Glucose 118 (H) 65 - 100 mg/dL BUN 25 (H) 6 - 23 MG/DL Creatinine 1.99 (H) 0.8 - 1.5 MG/DL  
 GFR est AA 44 (L) >60 ml/min/1.73m2 GFR est non-AA 37 (L) >60 ml/min/1.73m2 Calcium 9.4 8.3 - 10.4 MG/DL  Bilirubin, total 0.3 0.2 - 1.1 MG/DL  
 ALT (SGPT) 38 12 - 65 U/L  
 AST (SGOT) 19 15 - 37 U/L  
 Alk. phosphatase 73 50 - 136 U/L Protein, total 7.5 6.3 - 8.2 g/dL Albumin 3.6 3.5 - 5.0 g/dL Globulin 3.9 (H) 2.3 - 3.5 g/dL A-G Ratio 0.9 (L) 1.2 - 3.5 MAGNESIUM Collection Time: 09/05/18  7:50 PM  
Result Value Ref Range Magnesium 1.5 (L) 1.8 - 2.4 mg/dL TSH 3RD GENERATION Collection Time: 09/05/18  7:50 PM  
Result Value Ref Range TSH 2.900 0.358 - 3.740 uIU/mL  
POC TROPONIN-I Collection Time: 09/05/18  8:31 PM  
Result Value Ref Range Troponin-I (POC) 0.05 0.02 - 0.05 ng/ml POC LACTIC ACID Collection Time: 09/05/18  8:36 PM  
Result Value Ref Range Lactic Acid (POC) 3.5 (H) 0.5 - 1.9 mmol/L Danilo Keyes MD; 9/5/2018 @9:37 PM Voice dictation software was used during the making of this note. This software is not perfect and grammatical and other typographical errors may be present.   This note has not been proofread for errors. 
===================================================================

## 2018-09-06 LAB
ALBUMIN SERPL-MCNC: 2.8 G/DL (ref 3.5–5)
ALBUMIN/GLOB SERPL: 1 {RATIO} (ref 1.2–3.5)
ALP SERPL-CCNC: 57 U/L (ref 50–136)
ALT SERPL-CCNC: 32 U/L (ref 12–65)
ANION GAP SERPL CALC-SCNC: 11 MMOL/L (ref 7–16)
APTT PPP: 41.2 SEC (ref 23.2–35.3)
APTT PPP: 85.9 SEC (ref 23.2–35.3)
AST SERPL-CCNC: 17 U/L (ref 15–37)
ATRIAL RATE: 133 BPM
ATRIAL RATE: 258 BPM
BASOPHILS # BLD: 0 K/UL (ref 0–0.2)
BASOPHILS NFR BLD: 0 % (ref 0–2)
BILIRUB SERPL-MCNC: 0.4 MG/DL (ref 0.2–1.1)
BUN SERPL-MCNC: 23 MG/DL (ref 6–23)
CALCIUM SERPL-MCNC: 7.7 MG/DL (ref 8.3–10.4)
CALCULATED R AXIS, ECG10: 39 DEGREES
CALCULATED R AXIS, ECG10: 44 DEGREES
CALCULATED T AXIS, ECG11: -125 DEGREES
CALCULATED T AXIS, ECG11: -152 DEGREES
CHLORIDE SERPL-SCNC: 107 MMOL/L (ref 98–107)
CO2 SERPL-SCNC: 23 MMOL/L (ref 21–32)
CREAT SERPL-MCNC: 1.82 MG/DL (ref 0.8–1.5)
DIAGNOSIS, 93000: NORMAL
DIAGNOSIS, 93000: NORMAL
DIFFERENTIAL METHOD BLD: ABNORMAL
EOSINOPHIL # BLD: 0.1 K/UL (ref 0–0.8)
EOSINOPHIL NFR BLD: 0 % (ref 0.5–7.8)
ERYTHROCYTE [DISTWIDTH] IN BLOOD BY AUTOMATED COUNT: 14.5 %
GLOBULIN SER CALC-MCNC: 2.9 G/DL (ref 2.3–3.5)
GLUCOSE SERPL-MCNC: 177 MG/DL (ref 65–100)
HCT VFR BLD AUTO: 36 % (ref 41.1–50.3)
HGB BLD-MCNC: 11.7 G/DL (ref 13.6–17.2)
IMM GRANULOCYTES # BLD: 0.2 K/UL (ref 0–0.5)
IMM GRANULOCYTES NFR BLD AUTO: 2 % (ref 0–5)
LACTATE SERPL-SCNC: 1.5 MMOL/L (ref 0.4–2)
LACTATE SERPL-SCNC: 2.5 MMOL/L (ref 0.4–2)
LYMPHOCYTES # BLD: 4.2 K/UL (ref 0.5–4.6)
LYMPHOCYTES NFR BLD: 28 % (ref 13–44)
MAGNESIUM SERPL-MCNC: 1.8 MG/DL (ref 1.8–2.4)
MCH RBC QN AUTO: 28.8 PG (ref 26.1–32.9)
MCHC RBC AUTO-ENTMCNC: 32.5 G/DL (ref 31.4–35)
MCV RBC AUTO: 88.7 FL (ref 79.6–97.8)
MONOCYTES # BLD: 0.9 K/UL (ref 0.1–1.3)
MONOCYTES NFR BLD: 6 % (ref 4–12)
NEUTS SEG # BLD: 9.7 K/UL (ref 1.7–8.2)
NEUTS SEG NFR BLD: 64 % (ref 43–78)
NRBC # BLD: 0 K/UL (ref 0–0.2)
PLATELET # BLD AUTO: 340 K/UL (ref 150–450)
PMV BLD AUTO: 9.2 FL (ref 9.4–12.3)
POTASSIUM SERPL-SCNC: 3.5 MMOL/L (ref 3.5–5.1)
PROCALCITONIN SERPL-MCNC: <0.1 NG/ML
PROT SERPL-MCNC: 5.7 G/DL (ref 6.3–8.2)
Q-T INTERVAL, ECG07: 262 MS
Q-T INTERVAL, ECG07: 310 MS
QRS DURATION, ECG06: 108 MS
QRS DURATION, ECG06: 96 MS
QTC CALCULATION (BEZET), ECG08: 452 MS
QTC CALCULATION (BEZET), ECG08: 462 MS
RBC # BLD AUTO: 4.06 M/UL (ref 4.23–5.6)
SODIUM SERPL-SCNC: 141 MMOL/L (ref 136–145)
VENTRICULAR RATE, ECG03: 128 BPM
VENTRICULAR RATE, ECG03: 187 BPM
WBC # BLD AUTO: 15.1 K/UL (ref 4.3–11.1)

## 2018-09-06 PROCEDURE — 74011250637 HC RX REV CODE- 250/637: Performed by: NURSE PRACTITIONER

## 2018-09-06 PROCEDURE — 74011250636 HC RX REV CODE- 250/636: Performed by: EMERGENCY MEDICINE

## 2018-09-06 PROCEDURE — 74011250636 HC RX REV CODE- 250/636: Performed by: NURSE PRACTITIONER

## 2018-09-06 PROCEDURE — 74011000258 HC RX REV CODE- 258: Performed by: EMERGENCY MEDICINE

## 2018-09-06 PROCEDURE — 36415 COLL VENOUS BLD VENIPUNCTURE: CPT

## 2018-09-06 PROCEDURE — 74011250637 HC RX REV CODE- 250/637: Performed by: INTERNAL MEDICINE

## 2018-09-06 PROCEDURE — 85025 COMPLETE CBC W/AUTO DIFF WBC: CPT

## 2018-09-06 PROCEDURE — 84145 PROCALCITONIN (PCT): CPT

## 2018-09-06 PROCEDURE — 77030020263 HC SOL INJ SOD CL0.9% LFCR 1000ML

## 2018-09-06 PROCEDURE — 65660000000 HC RM CCU STEPDOWN

## 2018-09-06 PROCEDURE — 80053 COMPREHEN METABOLIC PANEL: CPT

## 2018-09-06 PROCEDURE — 74011000258 HC RX REV CODE- 258: Performed by: INTERNAL MEDICINE

## 2018-09-06 PROCEDURE — 74011636637 HC RX REV CODE- 636/637: Performed by: INTERNAL MEDICINE

## 2018-09-06 PROCEDURE — 83605 ASSAY OF LACTIC ACID: CPT

## 2018-09-06 PROCEDURE — 83735 ASSAY OF MAGNESIUM: CPT

## 2018-09-06 PROCEDURE — 85730 THROMBOPLASTIN TIME PARTIAL: CPT

## 2018-09-06 PROCEDURE — 74011250636 HC RX REV CODE- 250/636: Performed by: INTERNAL MEDICINE

## 2018-09-06 PROCEDURE — 87040 BLOOD CULTURE FOR BACTERIA: CPT

## 2018-09-06 RX ORDER — OXYCODONE AND ACETAMINOPHEN 5; 325 MG/1; MG/1
1 TABLET ORAL
Status: DISCONTINUED | OUTPATIENT
Start: 2018-09-06 | End: 2018-09-11 | Stop reason: HOSPADM

## 2018-09-06 RX ORDER — HEPARIN SODIUM 5000 [USP'U]/ML
35 INJECTION, SOLUTION INTRAVENOUS; SUBCUTANEOUS ONCE
Status: COMPLETED | OUTPATIENT
Start: 2018-09-06 | End: 2018-09-06

## 2018-09-06 RX ORDER — DILTIAZEM HYDROCHLORIDE 60 MG/1
60 TABLET, FILM COATED ORAL EVERY 6 HOURS
Status: DISCONTINUED | OUTPATIENT
Start: 2018-09-06 | End: 2018-09-08

## 2018-09-06 RX ORDER — VANCOMYCIN 1.75 GRAM/500 ML IN 0.9 % SODIUM CHLORIDE INTRAVENOUS
1750
Status: DISCONTINUED | OUTPATIENT
Start: 2018-09-06 | End: 2018-09-07

## 2018-09-06 RX ORDER — ACETAMINOPHEN 325 MG/1
650 TABLET ORAL
Status: DISCONTINUED | OUTPATIENT
Start: 2018-09-06 | End: 2018-09-11 | Stop reason: HOSPADM

## 2018-09-06 RX ORDER — LIDOCAINE 40 MG/G
CREAM TOPICAL 2 TIMES DAILY
Status: DISCONTINUED | OUTPATIENT
Start: 2018-09-06 | End: 2018-09-11 | Stop reason: HOSPADM

## 2018-09-06 RX ORDER — DILTIAZEM HYDROCHLORIDE 30 MG/1
30 TABLET, FILM COATED ORAL EVERY 6 HOURS
Status: DISCONTINUED | OUTPATIENT
Start: 2018-09-06 | End: 2018-09-06

## 2018-09-06 RX ADMIN — HEPARIN SODIUM AND DEXTROSE 12 UNITS/KG/HR: 5000; 5 INJECTION INTRAVENOUS at 01:30

## 2018-09-06 RX ADMIN — PIPERACILLIN SODIUM,TAZOBACTAM SODIUM 3.38 G: 3; .375 INJECTION, POWDER, FOR SOLUTION INTRAVENOUS at 17:19

## 2018-09-06 RX ADMIN — SODIUM CHLORIDE 360 ML: 900 INJECTION, SOLUTION INTRAVENOUS at 02:09

## 2018-09-06 RX ADMIN — OXYCODONE AND ACETAMINOPHEN 1 TABLET: 5; 325 TABLET ORAL at 01:01

## 2018-09-06 RX ADMIN — VANCOMYCIN HYDROCHLORIDE 2500 MG: 10 INJECTION, POWDER, LYOPHILIZED, FOR SOLUTION INTRAVENOUS at 05:08

## 2018-09-06 RX ADMIN — SODIUM CHLORIDE 15 MG/HR: 900 INJECTION, SOLUTION INTRAVENOUS at 02:39

## 2018-09-06 RX ADMIN — DILTIAZEM HYDROCHLORIDE 60 MG: 60 TABLET, FILM COATED ORAL at 23:52

## 2018-09-06 RX ADMIN — SODIUM CHLORIDE 125 ML/HR: 900 INJECTION, SOLUTION INTRAVENOUS at 14:47

## 2018-09-06 RX ADMIN — PREDNISONE 50 MG: 20 TABLET ORAL at 09:08

## 2018-09-06 RX ADMIN — DILTIAZEM HYDROCHLORIDE 30 MG: 30 TABLET, FILM COATED ORAL at 12:09

## 2018-09-06 RX ADMIN — SODIUM CHLORIDE 1000 ML: 900 INJECTION, SOLUTION INTRAVENOUS at 01:59

## 2018-09-06 RX ADMIN — OXYCODONE AND ACETAMINOPHEN 1 TABLET: 5; 325 TABLET ORAL at 09:08

## 2018-09-06 RX ADMIN — SODIUM CHLORIDE 15 MG/HR: 900 INJECTION, SOLUTION INTRAVENOUS at 10:02

## 2018-09-06 RX ADMIN — SODIUM CHLORIDE 125 ML/HR: 900 INJECTION, SOLUTION INTRAVENOUS at 06:00

## 2018-09-06 RX ADMIN — VANCOMYCIN HYDROCHLORIDE 1750 MG: 10 INJECTION, POWDER, LYOPHILIZED, FOR SOLUTION INTRAVENOUS at 22:57

## 2018-09-06 RX ADMIN — OXYCODONE AND ACETAMINOPHEN 1 TABLET: 5; 325 TABLET ORAL at 23:54

## 2018-09-06 RX ADMIN — DILTIAZEM HYDROCHLORIDE 60 MG: 60 TABLET, FILM COATED ORAL at 16:52

## 2018-09-06 RX ADMIN — ACETAMINOPHEN 650 MG: 325 TABLET ORAL at 02:45

## 2018-09-06 RX ADMIN — SODIUM CHLORIDE 1000 ML: 900 INJECTION, SOLUTION INTRAVENOUS at 01:29

## 2018-09-06 RX ADMIN — Medication 5 ML: at 05:21

## 2018-09-06 RX ADMIN — POTASSIUM CHLORIDE 20 MEQ: 20 TABLET, EXTENDED RELEASE ORAL at 00:12

## 2018-09-06 RX ADMIN — PIPERACILLIN SODIUM,TAZOBACTAM SODIUM 3.38 G: 3; .375 INJECTION, POWDER, FOR SOLUTION INTRAVENOUS at 01:53

## 2018-09-06 RX ADMIN — SODIUM CHLORIDE 1000 ML: 900 INJECTION, SOLUTION INTRAVENOUS at 00:33

## 2018-09-06 RX ADMIN — SODIUM CHLORIDE 125 ML/HR: 900 INJECTION, SOLUTION INTRAVENOUS at 22:59

## 2018-09-06 RX ADMIN — HEPARIN SODIUM 3950 UNITS: 5000 INJECTION INTRAVENOUS; SUBCUTANEOUS at 10:29

## 2018-09-06 RX ADMIN — SODIUM CHLORIDE 15 MG/HR: 900 INJECTION, SOLUTION INTRAVENOUS at 17:19

## 2018-09-06 RX ADMIN — HEPARIN SODIUM AND DEXTROSE 16 UNITS/KG/HR: 5000; 5 INJECTION INTRAVENOUS at 19:05

## 2018-09-06 RX ADMIN — PIPERACILLIN SODIUM,TAZOBACTAM SODIUM 3.38 G: 3; .375 INJECTION, POWDER, FOR SOLUTION INTRAVENOUS at 09:08

## 2018-09-06 RX ADMIN — OXYCODONE AND ACETAMINOPHEN 1 TABLET: 5; 325 TABLET ORAL at 16:51

## 2018-09-06 RX ADMIN — MAGNESIUM SULFATE HEPTAHYDRATE 2 G: 40 INJECTION, SOLUTION INTRAVENOUS at 00:12

## 2018-09-06 NOTE — ED NOTES
TRANSFER - OUT REPORT: 
 
Verbal report given to Kelin(name) on [facility]  being transferred to [facility](unit) for routine progression of care Report consisted of patients Situation, Background, Assessment and  
Recommendations(SBAR). Information from the following report(s) SBAR, ED Summary, MAR, Recent Results and Cardiac Rhythm AFib w/ RVR was reviewed with the receiving nurse. Lines:  
Peripheral IV 09/05/18 Left Hand (Active) Site Assessment Clean, dry, & intact 9/5/2018  7:57 PM  
Phlebitis Assessment 0 9/5/2018  7:57 PM  
Infiltration Assessment 0 9/5/2018  7:57 PM  
Dressing Status Clean, dry, & intact 9/5/2018  7:57 PM  
  
 
Opportunity for questions and clarification was provided. Patient transported with: 
 Monitor Registered Nurse

## 2018-09-06 NOTE — CONSULTS
[facility] Cardiology Consult                Date of  Admission: 9/5/2018  7:44 PM     Primary Care Physician: Dr. Sean Castaneda  Primary Cardiologist: DEANNA  Referring Physician: Hospitalist   Consulting Physician: Dr. Margaret Naqvi    CC/Reason for consult: A. Fib with RVR      Jose Maria Harding is a 59 y.o. male with prior h/o HTN and on prednisone for pyoderma gangrenosum of left dorsal foot followed by [facility] wound care and Dr. Kimberly Red of podiatry s/p debridement. Patient presented to ED at [facility] with c/o fatigue and recent syncope. In ED EKG showed A. fib with RVR. Labs showed elevated LA at 3.5, trop neg, WBC 21.6, CR 1.99. Hospitalist admitted for possible sepsis/cellulitis foot and consulted cardiology for A. Fib with RVR. Currently tele monitor showing A. Fib rates 90-110s. He is on cardizem and hep gtt. Diagnosis    Poorly-controlled hypertension    Obesity, unspecified    Tobacco abuse    Essential hypertension    Atrial fibrillation with rapid ventricular response (HCC)    Cellulitis of foot    Pyoderma gangrenosum    Hypokalemia    Hypomagnesemia    CKD (chronic kidney disease)    Current chronic use of systemic steroids       Past Medical History:   Diagnosis Date    Atrial fibrillation with rapid ventricular response ([facility] [address] 9/5/2018    Hypertension 2007    Pyoderma gangrenosum 9/5/2018      History reviewed. No pertinent surgical history.   No Known Allergies   Family History   Problem Relation Age of Onset    Arthritis-osteo Mother     Asthma Mother     Cancer Mother     Hypertension Mother     Stroke Father     Arthritis-osteo Maternal Grandmother     Arthritis-osteo Maternal Grandfather     Hypertension Maternal Grandfather     Stroke Maternal Grandfather     Arthritis-osteo Paternal Grandmother     Diabetes Paternal Grandmother     Arthritis-osteo Paternal Grandfather     Heart Disease Paternal Grandfather     Stroke Paternal Grandfather         Current Facility-Administered Medications Medication Dose Route Frequency    oxyCODONE-acetaminophen (PERCOCET) 5-325 mg per tablet 1 Tab  1 Tab Oral Q6H PRN    acetaminophen (TYLENOL) tablet 650 mg  650 mg Oral Q6H PRN    dilTIAZem (CARDIZEM) 100 mg in 0.9% sodium chloride (MBP/ADV) 100 mL infusion  5-15 mg/hr IntraVENous TITRATE    predniSONE (DELTASONE) tablet 50 mg  50 mg Oral DAILY    sodium chloride (NS) flush 5-10 mL  5-10 mL IntraVENous PRN    piperacillin-tazobactam (ZOSYN) 3.375 g in 0.9% sodium chloride (MBP/ADV) 100 mL  3.375 g IntraVENous Q8H    0.9% sodium chloride infusion  125 mL/hr IntraVENous CONTINUOUS    heparin 25,000 units in dextrose 500 mL infusion  12-25 Units/kg/hr (Adjusted) IntraVENous TITRATE    vancomycin (VANCOCIN) 750 mg in  ml infusion  750 mg IntraVENous Q12H       Review of Systems   Constitution: Positive for weakness and malaise/fatigue. Negative for diaphoresis. HENT: Negative for congestion. Cardiovascular: Positive for near-syncope. Negative for chest pain, claudication, cyanosis, dyspnea on exertion, irregular heartbeat, leg swelling, orthopnea, palpitations, paroxysmal nocturnal dyspnea and syncope. Respiratory: Negative for cough, shortness of breath and wheezing. Endocrine: Negative for cold intolerance and heat intolerance. Hematologic/Lymphatic: Does not bruise/bleed easily. Skin: Negative for nail changes. Neurological: Negative for dizziness and headaches.         Physical Exam  Vitals:    09/05/18 2321 09/06/18 0104 09/06/18 0511 09/06/18 0600   BP: 122/70 133/72 110/81 106/80   Pulse: (!) 150 (!) 133 (!) 107 98   Resp: 18 20 18 17   Temp:  97.8 °F (36.6 °C) 98.1 °F (36.7 °C)    SpO2: 98% 98% 95%    Weight:   112.2 kg (247 lb 6.4 oz)    Height:           Physical Exam:  General: Well Developed, Well Nourished, No Acute Distress  HEENT: pupils equal and round, no abnormalities noted  Neck: supple, no JVD, no carotid bruits  Heart: S1S2 irregular irregular and tachycardic Lungs: Clear throughout auscultation bilaterally without adventitious sounds  Abd: soft, nontender, nondistended, with good bowel sounds  Ext: warm, no edema, calves supple/nontender, pulses 2+ bilaterally left foot/toe area with redness and swelling  Skin: warm and dry  Psychiatric: Normal mood and affect  Neurologic: Alert and oriented X 3    Cardiographics    Telemetry: A. fib with RVR  ECG: Atrial fibrillation with rapid ventricular response   Diffuse ST & T wave abnormality noted  Echocardiogram: ordered    Labs:   Recent Labs      09/06/18   0354  09/05/18   1950   NA  141  139   K  3.5  3.3*   MG  1.8  1.5*   BUN  23  25*   CREA  1.82*  1.99*   GLU  177*  118*   WBC  15.1*  21.6*   HGB  11.7*  14.1   HCT  36.0*  43.5   PLT  340  457*        Assessment/Plan:     Assessment:      Principal Problem:    Cellulitis of foot (9/5/2018)- per primary team; on abx    Active Problems:    Obesity, unspecified (9/20/2017)      Tobacco abuse (9/20/2017)      Essential hypertension (11/7/2017)- controlled      Atrial fibrillation with rapid ventricular response ([address] (9/5/2018)- likely triggered by acute infection. Will continue cardizem and hep gtt. Echo pending today. May need to consider SHAE/DCCV in am once infectious process improves. Will start low dose cardizem IR as well. Pyoderma gangrenosum (9/5/2018)      Hypokalemia (9/5/2018)- replaced this am; labs in am      Hypomagnesemia (9/5/2018)- replaced this am; labs in am      CKD (chronic kidney disease) (9/5/2018)      Current chronic use of systemic steroids (9/5/2018)      Thank you very much for this referral. We appreciate the opportunity to participate in this patient's care. We will follow along with above stated plan.     Crispin Disla NP  Consulting MD: Dr. Margaret Naqvi

## 2018-09-06 NOTE — PROGRESS NOTES
Bedside and Verbal shift change report given to self and Daniella RNs (oncoming nurse) by Keila Meza RN (offgoing nurse). Report included the following information SBAR, Kardex, Intake/Output, MAR and Recent Results.

## 2018-09-06 NOTE — H&P
Hospitalist H&P Note Admit Date:  2018  7:44 PM  
Name:  Alessandro Dailey Age:  59 y.o. 
:  1960 MRN:  [id number] PCP:  Sania Colin MD 
Treatment Team: Attending Provider: Lacy Bustos MD 
 
HPI:  
 
CC:  Fast heart rate Mr. Los Zarate is a 58 yo male with PMH of HTN evaluated at PCP office today to due malaise and recent syncope. He was found to have new onset afib with RVR and sent for ED workup. IV cardizem has been started and cardiology aware for a consult but due to lactic acidosis and leukocytosis he is referred for hospitalist admit to investigate an underlying sepsis. He has had pyoderma gangrenosum of left dorsal foot followed by [facility] wound care and Dr. Julia Obrien of podiatry s/p debridement. There is surrounding erythema but he denies silvestre infection complaints. UA and CXR ordered. Denies fever/ shortness of breath. Takes prednisone 50 mg daily for pyoderma gangrenosum  But has missed several recent doses. 10 systems reviewed and negative except as noted in HPI. - has palpitations and easy bruising Past Medical History:  
Diagnosis Date  Atrial fibrillation with rapid ventricular response ([address] 2018  Hypertension 2007  Pyoderma gangrenosum 2018 History reviewed. No pertinent surgical history. No Known Allergies Social History Substance Use Topics  Smoking status: Current Every Day Smoker Types: Cigarettes  Smokeless tobacco: Never Used  Alcohol use 0.6 oz/week 1 Glasses of wine per week Family History Problem Relation Age of Onset 24 [facility] Arthritis-osteo Mother  Asthma Mother  Cancer Mother  Hypertension Mother  Stroke Father  Arthritis-osteo Maternal Grandmother  Arthritis-osteo Maternal Grandfather  Hypertension Maternal Grandfather  Stroke Maternal Grandfather  Arthritis-osteo Paternal Grandmother  Diabetes Paternal Grandmother  Arthritis-osteo Paternal Grandfather  Heart Disease Paternal Grandfather  Stroke Paternal Grandfather There is no immunization history on file for this patient. PTA Medications: 
Prior to Admission Medications Prescriptions Last Dose Informant Patient Reported? Taking? Olmesartan-amLODIPine-HCTZ (TRIBENZOR) 40-10-25 mg tab   No No  
Sig: Take 1 Tab by mouth daily. oxyCODONE-acetaminophen (PERCOCET) 5-325 mg per tablet   Yes No  
Sig: take 1 tablet by oral route  every 6 hours as needed FOR PAIN  
predniSONE (DELTASONE) 50 mg tablet   Yes No  
Sig: Take 1 Tab by mouth.  
sildenafil citrate (VIAGRA) 100 mg tablet   No No  
Sig: Take 1 Tab by mouth as needed. sodium hyaluronate (BIONECT) 0.2 % topical cream   Yes No  
Sig: apply to affected area daily Facility-Administered Medications: None Objective:  
Patient Vitals for the past 24 hrs: 
 Temp Pulse Resp BP SpO2  
09/05/18 2200 - (!) 147 19 126/74 96 % 09/05/18 2134 - (!) 159 - 114/89 97 % 09/05/18 2059 - (!) 141 27 (!) 132/97 98 % 09/05/18 2026 - (!) 133 16 130/78 97 % 09/05/18 2013 - (!) 169 - (!) 154/101 -  
09/05/18 2006 - - - - 98 % 09/05/18 2004 - (!) 146 - 126/79 98 % 09/05/18 2000 - (!) 185 - (!) 167/96 -  
09/05/18 1959 - (!) 193 - (!) 167/96 99 % 09/05/18 1954 - (!) 190 - - -  
09/05/18 1939 98.1 °F (36.7 °C) (!) 138 20 139/90 98 % Oxygen Therapy O2 Sat (%): 96 % (09/05/18 2200) Pulse via Oximetry: 79 beats per minute (09/05/18 2200) O2 Device: Room air (09/05/18 2006) No intake or output data in the 24 hours ending 09/05/18 2231 Physical Exam: 
General:    Alert. No distress Eyes:   Normal sclera. Extraocular movements intact. PERRLA 
ENT:  Normocephalic, atraumatic. Moist mucous membranes CV:   tachycardic and irregular ,  No edema Lungs:  CTAB. No wheezing, rhonchi, or rales. Abdomen: Soft, nontender, nondistended. Present BS, mild mottling of abdominal wall Extremities: Warm and dry. . 
Neurologic:  grossly intact. Skin:     Left dorsal foot with 5 cm rectangular area of surgically debrided tissue with clean base and surrounding erythema Psych:  Normal mood and affect. I reviewed the labs, imaging, EKGs, telemetry, and other studies done this admission. EKG: tracing personally reviewed as afib with RVR Data Review:  
Recent Results (from the past 24 hour(s)) EKG, 12 LEAD, INITIAL Collection Time: 09/05/18  7:40 PM  
Result Value Ref Range Ventricular Rate 187 BPM  
 Atrial Rate 258 BPM  
 QRS Duration 96 ms  
 Q-T Interval 262 ms QTC Calculation (Bezet) 462 ms Calculated R Axis 44 degrees Calculated T Axis -125 degrees Diagnosis Atrial fibrillation with rapid ventricular response Marked ST abnormality, possible inferior subendocardial injury Abnormal ECG No previous ECGs available CBC WITH AUTOMATED DIFF Collection Time: 09/05/18  7:50 PM  
Result Value Ref Range WBC 21.6 (H) 4.3 - 11.1 K/uL  
 RBC 4.91 4.23 - 5.6 M/uL  
 HGB 14.1 13.6 - 17.2 g/dL HCT 43.5 41.1 - 50.3 % MCV 88.6 79.6 - 97.8 FL  
 MCH 28.7 26.1 - 32.9 PG  
 MCHC 32.4 31.4 - 35.0 g/dL  
 RDW 14.3 % PLATELET 381 (H) 579 - 450 K/uL MPV 9.0 (L) 9.4 - 12.3 FL ABSOLUTE NRBC 0.00 0.0 - 0.2 K/uL  
 DF AUTOMATED NEUTROPHILS 64 43 - 78 % LYMPHOCYTES 28 13 - 44 % MONOCYTES 7 4.0 - 12.0 % EOSINOPHILS 0 (L) 0.5 - 7.8 % BASOPHILS 0 0.0 - 2.0 % IMMATURE GRANULOCYTES 1 0.0 - 5.0 %  
 ABS. NEUTROPHILS 13.9 (H) 1.7 - 8.2 K/UL  
 ABS. LYMPHOCYTES 6.0 (H) 0.5 - 4.6 K/UL  
 ABS. MONOCYTES 1.4 (H) 0.1 - 1.3 K/UL  
 ABS. EOSINOPHILS 0.1 0.0 - 0.8 K/UL  
 ABS. BASOPHILS 0.0 0.0 - 0.2 K/UL  
 ABS. IMM. GRANS. 0.2 0.0 - 0.5 K/UL METABOLIC PANEL, COMPREHENSIVE Collection Time: 09/05/18  7:50 PM  
Result Value Ref Range Sodium 139 136 - 145 mmol/L Potassium 3.3 (L) 3.5 - 5.1 mmol/L  Chloride 102 98 - 107 mmol/L  
 CO2 27 21 - 32 mmol/L Anion gap 10 7 - 16 mmol/L Glucose 118 (H) 65 - 100 mg/dL BUN 25 (H) 6 - 23 MG/DL Creatinine 1.99 (H) 0.8 - 1.5 MG/DL  
 GFR est AA 44 (L) >60 ml/min/1.73m2 GFR est non-AA 37 (L) >60 ml/min/1.73m2 Calcium 9.4 8.3 - 10.4 MG/DL Bilirubin, total 0.3 0.2 - 1.1 MG/DL  
 ALT (SGPT) 38 12 - 65 U/L  
 AST (SGOT) 19 15 - 37 U/L Alk. phosphatase 73 50 - 136 U/L Protein, total 7.5 6.3 - 8.2 g/dL Albumin 3.6 3.5 - 5.0 g/dL Globulin 3.9 (H) 2.3 - 3.5 g/dL A-G Ratio 0.9 (L) 1.2 - 3.5 MAGNESIUM Collection Time: 09/05/18  7:50 PM  
Result Value Ref Range Magnesium 1.5 (L) 1.8 - 2.4 mg/dL TSH 3RD GENERATION Collection Time: 09/05/18  7:50 PM  
Result Value Ref Range TSH 2.900 0.358 - 3.740 uIU/mL  
CK Collection Time: 09/05/18  7:50 PM  
Result Value Ref Range CK 57 21 - 215 U/L  
PROCALCITONIN Collection Time: 09/05/18  7:50 PM  
Result Value Ref Range Procalcitonin 0.1 ng/mL EKG, 12 LEAD, SUBSEQUENT Collection Time: 09/05/18  8:19 PM  
Result Value Ref Range Ventricular Rate 128 BPM  
 Atrial Rate 133 BPM  
 QRS Duration 108 ms Q-T Interval 310 ms QTC Calculation (Bezet) 452 ms Calculated R Axis 39 degrees Calculated T Axis -152 degrees Diagnosis    
  !! AGE AND GENDER SPECIFIC ECG ANALYSIS !! Atrial fibrillation with rapid ventricular response ST & T wave abnormality, consider inferior ischemia or digitalis effect ST & T wave abnormality, consider anterolateral ischemia or digitalis effect Abnormal ECG When compared with ECG of 05-SEP-2018 20:06, No significant change was found POC TROPONIN-I Collection Time: 09/05/18  8:31 PM  
Result Value Ref Range Troponin-I (POC) 0.05 0.02 - 0.05 ng/ml POC LACTIC ACID Collection Time: 09/05/18  8:36 PM  
Result Value Ref Range Lactic Acid (POC) 3.5 (H) 0.5 - 1.9 mmol/L  
URINE MICROSCOPIC Collection Time: 09/05/18  9:15 PM  
Result Value Ref Range WBC 5-10 0 /hpf  
 RBC 0-3 0 /hpf Epithelial cells 0-3 0 /hpf Bacteria 0 0 /hpf Casts 5-10 0 /lpf All Micro Results None Other Studies: Xr Chest Sngl V Result Date: 9/5/2018 EXAM:  XR CHEST SNGL V INDICATION:  chest pain COMPARISON:  None. FINDINGS: A portable AP radiograph of the chest was obtained at 2028 hours. The patient is on a cardiac monitor. The lungs are clear. The cardiac and mediastinal contours and pulmonary vascularity are normal.  The bones and soft tissues are grossly within normal limits. IMPRESSION: Normal chest.  
 
 
Assessment and Plan:  
 
Hospital Problems as of 9/5/2018  Date Reviewed: 1/9/2018 Codes Class Noted - Resolved POA Atrial fibrillation with rapid ventricular response (HCC) ICD-10-CM: I48.91 
ICD-9-CM: 427.31  9/5/2018 - Present Unknown Cellulitis of foot (Chronic) ICD-10-CM: F64.977 ICD-9-CM: 682.7  9/5/2018 - Present Yes Pyoderma gangrenosum (Chronic) ICD-10-CM: B22 
ICD-9-CM: 686.01  9/5/2018 - Present Yes Hypokalemia ICD-10-CM: E87.6 ICD-9-CM: 276.8  9/5/2018 - Present Yes Hypomagnesemia ICD-10-CM: R92.18 
ICD-9-CM: 275.2  9/5/2018 - Present Yes  
   
 CKD (chronic kidney disease) (Chronic) ICD-10-CM: N18.9 ICD-9-CM: 585.9  9/5/2018 - Present Yes Current chronic use of systemic steroids (Chronic) ICD-10-CM: Z79.52 
ICD-9-CM: V58.65  9/5/2018 - Present Yes Essential hypertension ICD-10-CM: I10 
ICD-9-CM: 401.9  11/7/2017 - Present Yes Obesity, unspecified ICD-10-CM: E66.9 ICD-9-CM: 278.00  9/20/2017 - Present Yes Tobacco abuse ICD-10-CM: Z72.0 ICD-9-CM: 305.1  9/20/2017 - Present Yes · AFIB with RVR: admit to tele, continue IV cardizem, add IV heparin drip, consult cardiology, ordered ECHO 
· HTN: hold tribenzor · Sepsis: mild cellulitis left dorsal foot, add vancomycin and zosyn until CXR/UA and BC resulted, followup lactic after hydration, xray left foot · Pyoderma gangrenosum: wound care consult , continue prednisone · Hypokalemia/ hypomagnesemia: replace and followup BMP · CKD: followup BMP · Tobacco use : needs cessation Discharge planning:  Pending course DVT ppx: IV heparin Code status:  Full Estimated LOS:  Greater than 2 midnights Risk:  high Care plan: aníbal Long [phone] Signed: Lacy Bustos MD

## 2018-09-06 NOTE — PROGRESS NOTES
Monitor room called to notify that patient was having SVT/160s. Notified Grupo Richardson NP. Order received to increase Cardizem 30 mg po to Cardizem 60 mg po and give dose now.

## 2018-09-06 NOTE — PROGRESS NOTES
Care Management Interventions PCP Verified by CM: Yes (saw 9/5/18 and then admitted to hospital) Palliative Care Criteria Met (RRAT>21 & CHF Dx)?: No (RRAT 3 Dx Atrial fib) Transition of Care Consult (CM Consult): Discharge Planning Discharge Durable Medical Equipment: No 
Physical Therapy Consult: No 
Occupational Therapy Consult: No 
Speech Therapy Consult: No 
Current Support Network: Other, Own Home (son lives with him ) Confirm Follow Up Transport: Self Plan discussed with Pt/Family/Caregiver: Yes Freedom of Choice Offered: Yes Discharge Location Discharge Placement: Home Met with patient for d/c planning. Patient alert and oriented x 3, independent of ADL's and his son lives with him in his home. He works full time at [employer] in [place] and he lives in [address] [place] [address]. He requires no DME at home and is able to drive without difficulty. He has [employer] and his pharmacy is [facility] in [place]. He states he is able to obtain his medications without difficulty. PCP is Dr. Ned Disla who he saw 9/5/18 and then was admitted. Patient voices no concerns or needs. D/C plan is home with son when medically stable. CM following.

## 2018-09-06 NOTE — PROGRESS NOTES
Pharmacokinetic Consult to Pharmacist 
 
Rosas Tello is a 59 y.o. male being treated for sepsis with vancomycin. Weight: 112 kg (247 lb) Lab Results Component Value Date/Time BUN 25 (H) 09/05/2018 07:50 PM  
 Creatinine 1.99 (H) 09/05/2018 07:50 PM  
 WBC 21.6 (H) 09/05/2018 07:50 PM  
 Procalcitonin 0.1 09/05/2018 07:50 PM  
 Lactic Acid (POC) 3.5 (H) 09/05/2018 08:36 PM  
  
Estimated Creatinine Clearance: 52.4 mL/min (based on Cr of 1.99). CULTURES: 
All Micro Results None Day 1 of vancomycin. Goal trough is 15-20. Vancomycin dose initiated at 2,500 mg load, followed by 750 mg q12h. Predicted trough 17.6. Will continue to follow patient. Thank you, Cecilio Solano, PharmD

## 2018-09-06 NOTE — PROGRESS NOTES
TRANSFER - IN REPORT: 
 
Verbal report received from Jasper York RN(name) on Samuel Pitts  being received from ED(unit) for routine progression of care Report consisted of patients Situation, Background, Assessment and  
Recommendations(SBAR). Information from the following report(s) ED Summary, MAR and Cardiac Rhythm A-fib, uncontrolled rate. was reviewed with the receiving nurse. Opportunity for questions and clarification was provided. Assessment completed upon patients arrival to unit and care assumed. Received to CV-Telemetry Unit, via stretcher from Emergency Department. Telemetry Chandan applied. Admitted to room 303, oriented to room, surroundings and staff, voiced understanding. Instructed and demonstrated use of hand held call-light and Bed rail controls. Assisted out of street clothing and dressed in [facility] [address], applied nonskid socks to feet. Head to toe skin assessment completed. Skin warm, dry, multiple scars and bilateral shins and forearms; heels and sacrum intact without breakdown; Mid-lower back has a small abrasion, approximately 8 mm in diameter. Left foot wound located at dorsalis pedis

## 2018-09-06 NOTE — PROGRESS NOTES
Per Dr. Bebeto Davenport, may order patient's home foot ointment medications, Bionect and Mike Emu with Lidocaine.

## 2018-09-06 NOTE — PROGRESS NOTES
Hospitalist Progress Note 2018 Admit Date: 2018  7:44 PM  
NAME: Moises Collins :  1960 MRN:  [id number] Attending: Tamiko Troncoso MD 
PCP:  Rick Rocha MD 
 
SUBJECTIVE:  
Patient is 7SP with hx htn, pyoderma gangrenosum presented with sepsis and new afib States feeling much better since being in hospital, no further chest discomfort or weakness. Has redness and warmth around pyoderma on L dorsum foot, has been red and draining for a week. No fevers/chills. Review of Systems negative with exception of pertinent positives noted above PHYSICAL EXAM  
 
Visit Vitals  /83  Pulse (!) 107  Temp 98 °F (36.7 °C)  Resp 20  
 Ht 6' 1\" (1.854 m)  Wt 112.2 kg (247 lb 6.4 oz)  SpO2 96%  BMI 32.64 kg/m2 Temp (24hrs), Av °F (36.7 °C), Min:97.4 °F (36.3 °C), Max:98.6 °F (37 °C) Oxygen Therapy O2 Sat (%): 96 % (18 1435) Pulse via Oximetry: 79 beats per minute (18 2200) O2 Device: Room air (18 1435) Intake/Output Summary (Last 24 hours) at 18 1746 Last data filed at 18 1435 Gross per 24 hour Intake                0 ml Output             1640 ml Net            -1640 ml General: No acute distress   
Lungs:  CTA Bilaterally. Heart:  Regular rate and rhythm,  No murmur, rub, or gallop. 2s cap refill Abdomen: Soft, Non distended, Non tender, Positive bowel sounds Extremities: No cyanosis, clubbing or edema Neurologic:  No focal deficits Skin:   Dorsum L foot with 5cm rectangle of granulation tissue with 2cm erythema and warmth surrounding ASSESSMENT Active Hospital Problems Diagnosis Date Noted  Atrial fibrillation with rapid ventricular response ([facility] [address] 2018  Cellulitis of foot 2018  Pyoderma gangrenosum 2018  Hypokalemia 2018  Hypomagnesemia 2018  CKD (chronic kidney disease) 2018  Current chronic use of systemic steroids 09/05/2018  Essential hypertension 11/07/2017  Tobacco abuse 09/20/2017  Obesity, unspecified 09/20/2017 Plan: #Sepsis - suspected soft tissue foot source. On broad coverage with vanc/zosyn for now, await blood cultures. Lactic acid initally trended down, then elevated again. Following. No signs osteo on XR. #Pyoderma Gangrenosum - site of infection. Per wound care. #Afib RVR - per cards. Heparin, dilt drip. Attributed to current sepsis. #HypoK, HypoMg - monitor and replace #TAYLOR - fluids, follow DVT Prophylaxis: heparin drip as above Signed By: Kenyetta Hernandez MD   
 September 6, 2018

## 2018-09-06 NOTE — WOUND CARE
Left dorsal foot ulcer, chronic, patient has been treated at [facility] wound clinic, and Dr. Henry Tubbs (podiatry) and Dr. Ita Ingram (dermatology) states this ulcer has been diagnosed as pyoderma gangrenosum, has no other known autoimmune disorders. Patient does smoke, has been counseled many times of quitting or reducing smoking, verbalizes understanding. Patient desires to use his same ointments recommended by Dr. Henry Tubbs and steroids that Dr. Henry Tubbs had been giving. Will order dressings bid as he was doing them at home. Will monitor. Bilateral DP and PT pulses are palpable and bilateral feet are warm.

## 2018-09-06 NOTE — PROGRESS NOTES
Problem: Falls - Risk of 
Goal: *Absence of Falls Document Marissa Marx Fall Risk and appropriate interventions in the flowsheet. Outcome: Progressing Towards Goal 
Fall Risk Interventions: 
Mobility Interventions: Communicate number of staff needed for ambulation/transfer, Patient to call before getting OOB, Bed/chair exit alarm Medication Interventions: Patient to call before getting OOB, Teach patient to arise slowly, Bed/chair exit alarm Elimination Interventions: Bed/chair exit alarm, Call light in reach, Urinal in reach, Toileting schedule/hourly rounds History of Falls Interventions: Bed/chair exit alarm, Investigate reason for fall, Door open when patient unattended

## 2018-09-07 LAB
ANION GAP SERPL CALC-SCNC: 11 MMOL/L (ref 7–16)
APTT PPP: 117.8 SEC (ref 23.2–35.3)
APTT PPP: 60.1 SEC (ref 23.2–35.3)
APTT PPP: 97.1 SEC (ref 23.2–35.3)
BUN SERPL-MCNC: 15 MG/DL (ref 6–23)
CALCIUM SERPL-MCNC: 7.8 MG/DL (ref 8.3–10.4)
CHLORIDE SERPL-SCNC: 107 MMOL/L (ref 98–107)
CO2 SERPL-SCNC: 24 MMOL/L (ref 21–32)
CREAT SERPL-MCNC: 1.49 MG/DL (ref 0.8–1.5)
ERYTHROCYTE [DISTWIDTH] IN BLOOD BY AUTOMATED COUNT: 14.7 %
GLUCOSE SERPL-MCNC: 127 MG/DL (ref 65–100)
HCT VFR BLD AUTO: 33.6 % (ref 41.1–50.3)
HGB BLD-MCNC: 10.8 G/DL (ref 13.6–17.2)
LACTATE SERPL-SCNC: 1.3 MMOL/L (ref 0.4–2)
MAGNESIUM SERPL-MCNC: 1.6 MG/DL (ref 1.8–2.4)
MCH RBC QN AUTO: 28.7 PG (ref 26.1–32.9)
MCHC RBC AUTO-ENTMCNC: 32.1 G/DL (ref 31.4–35)
MCV RBC AUTO: 89.4 FL (ref 79.6–97.8)
NRBC # BLD: 0 K/UL (ref 0–0.2)
PLATELET # BLD AUTO: 314 K/UL (ref 150–450)
PMV BLD AUTO: 9.4 FL (ref 9.4–12.3)
POTASSIUM SERPL-SCNC: 3.4 MMOL/L (ref 3.5–5.1)
RBC # BLD AUTO: 3.76 M/UL (ref 4.23–5.6)
SODIUM SERPL-SCNC: 142 MMOL/L (ref 136–145)
VANCOMYCIN TROUGH SERPL-MCNC: 20 UG/ML (ref 5–20)
WBC # BLD AUTO: 15.3 K/UL (ref 4.3–11.1)

## 2018-09-07 PROCEDURE — 65660000000 HC RM CCU STEPDOWN

## 2018-09-07 PROCEDURE — 77030020263 HC SOL INJ SOD CL0.9% LFCR 1000ML

## 2018-09-07 PROCEDURE — 85730 THROMBOPLASTIN TIME PARTIAL: CPT

## 2018-09-07 PROCEDURE — 74011250636 HC RX REV CODE- 250/636: Performed by: EMERGENCY MEDICINE

## 2018-09-07 PROCEDURE — 80048 BASIC METABOLIC PNL TOTAL CA: CPT

## 2018-09-07 PROCEDURE — 74011250637 HC RX REV CODE- 250/637: Performed by: NURSE PRACTITIONER

## 2018-09-07 PROCEDURE — 74011636637 HC RX REV CODE- 636/637: Performed by: INTERNAL MEDICINE

## 2018-09-07 PROCEDURE — 83605 ASSAY OF LACTIC ACID: CPT

## 2018-09-07 PROCEDURE — 74011250636 HC RX REV CODE- 250/636: Performed by: INTERNAL MEDICINE

## 2018-09-07 PROCEDURE — 74011250637 HC RX REV CODE- 250/637: Performed by: INTERNAL MEDICINE

## 2018-09-07 PROCEDURE — 36415 COLL VENOUS BLD VENIPUNCTURE: CPT

## 2018-09-07 PROCEDURE — 80202 ASSAY OF VANCOMYCIN: CPT

## 2018-09-07 PROCEDURE — 85027 COMPLETE CBC AUTOMATED: CPT

## 2018-09-07 PROCEDURE — 83735 ASSAY OF MAGNESIUM: CPT

## 2018-09-07 PROCEDURE — 74011250636 HC RX REV CODE- 250/636: Performed by: FAMILY MEDICINE

## 2018-09-07 PROCEDURE — 74011000258 HC RX REV CODE- 258: Performed by: FAMILY MEDICINE

## 2018-09-07 PROCEDURE — 74011000258 HC RX REV CODE- 258: Performed by: EMERGENCY MEDICINE

## 2018-09-07 PROCEDURE — C8929 TTE W OR WO FOL WCON,DOPPLER: HCPCS

## 2018-09-07 PROCEDURE — 74011000258 HC RX REV CODE- 258: Performed by: INTERNAL MEDICINE

## 2018-09-07 PROCEDURE — 74011000250 HC RX REV CODE- 250: Performed by: FAMILY MEDICINE

## 2018-09-07 PROCEDURE — 74011250637 HC RX REV CODE- 250/637: Performed by: FAMILY MEDICINE

## 2018-09-07 RX ORDER — MAGNESIUM SULFATE HEPTAHYDRATE 40 MG/ML
2 INJECTION, SOLUTION INTRAVENOUS ONCE
Status: COMPLETED | OUTPATIENT
Start: 2018-09-07 | End: 2018-09-09

## 2018-09-07 RX ORDER — POTASSIUM CHLORIDE 20MEQ/15ML
20 LIQUID (ML) ORAL 2 TIMES DAILY WITH MEALS
Status: COMPLETED | OUTPATIENT
Start: 2018-09-07 | End: 2018-09-07

## 2018-09-07 RX ORDER — VANCOMYCIN 1.75 GRAM/500 ML IN 0.9 % SODIUM CHLORIDE INTRAVENOUS
1750 EVERY 12 HOURS
Status: DISCONTINUED | OUTPATIENT
Start: 2018-09-07 | End: 2018-09-07

## 2018-09-07 RX ORDER — HEPARIN SODIUM 5000 [USP'U]/ML
35 INJECTION, SOLUTION INTRAVENOUS; SUBCUTANEOUS ONCE
Status: COMPLETED | OUTPATIENT
Start: 2018-09-07 | End: 2018-09-07

## 2018-09-07 RX ADMIN — PREDNISONE 50 MG: 20 TABLET ORAL at 08:45

## 2018-09-07 RX ADMIN — SODIUM CHLORIDE 125 ML/HR: 900 INJECTION, SOLUTION INTRAVENOUS at 17:13

## 2018-09-07 RX ADMIN — OXYCODONE AND ACETAMINOPHEN 1 TABLET: 5; 325 TABLET ORAL at 11:35

## 2018-09-07 RX ADMIN — PIPERACILLIN SODIUM,TAZOBACTAM SODIUM 3.38 G: 3; .375 INJECTION, POWDER, FOR SOLUTION INTRAVENOUS at 08:41

## 2018-09-07 RX ADMIN — POTASSIUM CHLORIDE 20 MEQ: 20 SOLUTION ORAL at 16:58

## 2018-09-07 RX ADMIN — LIDOCAINE: 40 CREAM TOPICAL at 11:45

## 2018-09-07 RX ADMIN — SODIUM CHLORIDE 900 MG: 900 INJECTION, SOLUTION INTRAVENOUS at 16:58

## 2018-09-07 RX ADMIN — PIPERACILLIN SODIUM,TAZOBACTAM SODIUM 3.38 G: 3; .375 INJECTION, POWDER, FOR SOLUTION INTRAVENOUS at 01:23

## 2018-09-07 RX ADMIN — OXYCODONE AND ACETAMINOPHEN 1 TABLET: 5; 325 TABLET ORAL at 23:06

## 2018-09-07 RX ADMIN — HEPARIN SODIUM AND DEXTROSE 18 UNITS/KG/HR: 5000; 5 INJECTION INTRAVENOUS at 11:36

## 2018-09-07 RX ADMIN — HEPARIN SODIUM 3950 UNITS: 5000 INJECTION INTRAVENOUS; SUBCUTANEOUS at 02:55

## 2018-09-07 RX ADMIN — MAGNESIUM SULFATE HEPTAHYDRATE 2 G: 40 INJECTION, SOLUTION INTRAVENOUS at 08:45

## 2018-09-07 RX ADMIN — LIDOCAINE: 40 CREAM TOPICAL at 23:52

## 2018-09-07 RX ADMIN — DILTIAZEM HYDROCHLORIDE 60 MG: 60 TABLET, FILM COATED ORAL at 23:06

## 2018-09-07 RX ADMIN — DILTIAZEM HYDROCHLORIDE 60 MG: 60 TABLET, FILM COATED ORAL at 17:00

## 2018-09-07 RX ADMIN — OXYCODONE AND ACETAMINOPHEN 1 TABLET: 5; 325 TABLET ORAL at 05:41

## 2018-09-07 RX ADMIN — SODIUM CHLORIDE 125 ML/HR: 900 INJECTION, SOLUTION INTRAVENOUS at 07:32

## 2018-09-07 RX ADMIN — SODIUM CHLORIDE 10 MG/HR: 900 INJECTION, SOLUTION INTRAVENOUS at 17:12

## 2018-09-07 RX ADMIN — DILTIAZEM HYDROCHLORIDE 60 MG: 60 TABLET, FILM COATED ORAL at 11:35

## 2018-09-07 RX ADMIN — DILTIAZEM HYDROCHLORIDE 60 MG: 60 TABLET, FILM COATED ORAL at 05:42

## 2018-09-07 RX ADMIN — SODIUM CHLORIDE 10 MG/HR: 900 INJECTION, SOLUTION INTRAVENOUS at 10:41

## 2018-09-07 RX ADMIN — VANCOMYCIN HYDROCHLORIDE 1750 MG: 10 INJECTION, POWDER, LYOPHILIZED, FOR SOLUTION INTRAVENOUS at 11:35

## 2018-09-07 RX ADMIN — OXYCODONE AND ACETAMINOPHEN 1 TABLET: 5; 325 TABLET ORAL at 17:11

## 2018-09-07 RX ADMIN — POTASSIUM CHLORIDE 20 MEQ: 20 SOLUTION ORAL at 08:47

## 2018-09-07 RX ADMIN — PERFLUTREN 1 ML: 6.52 INJECTION, SUSPENSION INTRAVENOUS at 09:00

## 2018-09-07 RX ADMIN — SODIUM CHLORIDE 900 MG: 900 INJECTION, SOLUTION INTRAVENOUS at 23:07

## 2018-09-07 NOTE — PROGRESS NOTES
Bedside and Verbal shift change report given to Brendan Michele RN (oncoming nurse) by self and Domenico Brewer (offgoing nurse). Report included the following information SBAR, Kardex, Intake/Output, MAR and Recent Results.

## 2018-09-07 NOTE — PROGRESS NOTES
Pt requesting dressing change be completed after next dose of percocet has time to work. Pt reports pain 4/10, but reports it will hurt very badly if I touch it at this time.

## 2018-09-07 NOTE — PROGRESS NOTES
9/7/2018 6:57 AM 
 
Admit Date: 9/5/2018 Admit Diagnosis: Atrial fibrillation with rapid ventricular response (HCC) Subjective: No cp or sob Objective:  
  
Visit Vitals  /85  Pulse 87  Temp 98.3 °F (36.8 °C)  Resp 20  
 Ht 6' 1\" (1.854 m)  Wt 115.5 kg (254 lb 9.6 oz)  SpO2 99%  BMI 33.59 kg/m2 Physical Exam: 
Merdis Canter, Well Nourished, No Acute Distress, Alert & Oriented x 3, appropriate mood. Neck- supple, no JVD 
CV- irregular rate and rhythm no MRG Lung- clear bilaterally Abd- soft, nontender, nondistended Ext- no edema bilaterally. Skin- warm and dry Data Review:  
Recent Labs  
   09/07/18 
 4646 NA  142  
K  3.4*  
BUN  15  
CREA  1.49 WBC  15.3* HGB  10.8* HCT  33.6*  
PLT  314 Assessment/Plan:  
 
Principal Problem: 
  Cellulitis of foot (9/5/2018) on iv abx Active Problems: 
  Obesity, unspecified (9/20/2017) Tobacco abuse (9/20/2017) Essential hypertension (11/7/2017) Atrial fibrillation with rapid ventricular response ([address] (9/5/2018)- rate ok and on heparin- plan michelle/dcc closer to dc when medical issues resolve- continue cardizem Replace K Pyoderma gangrenosum (9/5/2018) Hypokalemia (9/5/2018) Hypomagnesemia (9/5/2018) CKD (chronic kidney disease) (9/5/2018) Current chronic use of systemic steroids (9/5/2018) \

## 2018-09-07 NOTE — PROGRESS NOTES
Bedside and Verbal shift change report given to self (oncoming nurse) by Darielarose Russell RN (offgoing nurse). Report included the following information SBAR, Kardex, ED Summary, Procedure Summary, Intake/Output, MAR, Recent Results and Cardiac Rhythm A Fib. Cardizem and heparin gtt verified at bedside and on MAR.

## 2018-09-07 NOTE — PROGRESS NOTES
Bedside and Verbal shift change report given to Blanche Coffey RN (oncoming nurse) by self (offgoing nurse). Report included the following information SBAR, Kardex, ED Summary, Procedure Summary, Intake/Output, MAR, Recent Results and Cardiac Rhythm A Fib. Heparin gtt verified at bedside and on MAR.

## 2018-09-07 NOTE — PROGRESS NOTES
Problem: Falls - Risk of 
Goal: *Absence of Falls Document Rajat Aranda Fall Risk and appropriate interventions in the flowsheet. Outcome: Progressing Towards Goal 
Fall Risk Interventions: 
Mobility Interventions: Communicate number of staff needed for ambulation/transfer, Patient to call before getting OOB Medication Interventions: Patient to call before getting OOB, Teach patient to arise slowly Elimination Interventions: Bed/chair exit alarm, Call light in reach, Urinal in reach, Toileting schedule/hourly rounds History of Falls Interventions: Door open when patient unattended, Evaluate medications/consider consulting pharmacy Problem: Afib Pathway: Day 2 Goal: *Hemodynamically stable Outcome: Progressing Towards Goal 
HR between 80s-90s, but also jump up to 100s and 40s. Cardizem gtt adjusted. BP elevated when patient moves during bp inflation. BP stable after recheck. Goal: *Optimal pain control at patient's stated goal 
Outcome: Resolved/Met Date Met: 09/06/18 Percocet helps patient with pain. Goal: *Stable cardiac rhythm Outcome: Resolved/Met Date Met: 09/06/18 Patient remains in A Fib, but rate control has improved. Goal: *Lungs clear or at baseline Outcome: Resolved/Met Date Met: 09/06/18 Lung sounds are clear and diminished at bases.

## 2018-09-07 NOTE — PROGRESS NOTES
Hospitalist Progress Note 2018 Admit Date: 2018  7:44 PM  
NAME: Yossi Kenoyn :  1960 MRN:  [id number] Attending: Guerda Caballero MD 
PCP:  John Hollingsworth MD 
 
SUBJECTIVE:  
Patient is 66SO with hx htn, pyoderma gangrenosum presented with sepsis and new afib Feeling well, no further chest discomfort or weakness. Improved pain/warmth in foot. No fevers/chills. Review of Systems negative with exception of pertinent positives noted above PHYSICAL EXAM  
 
Visit Vitals  /85  Pulse 87  Temp 98.3 °F (36.8 °C)  Resp 20  
 Ht 6' 1\" (1.854 m)  Wt 115.5 kg (254 lb 9.6 oz)  SpO2 99%  BMI 33.59 kg/m2 Temp (24hrs), Av.4 °F (36.9 °C), Min:98 °F (36.7 °C), Max:98.6 °F (37 °C) Oxygen Therapy O2 Sat (%): 99 % (18 0516) Pulse via Oximetry: 79 beats per minute (18 2200) O2 Device: Room air (18 0546) Intake/Output Summary (Last 24 hours) at 18 7535 Last data filed at 18 8035 Gross per 24 hour Intake              340 ml Output             1490 ml Net            -1150 ml General: No acute distress   
Lungs:  CTA Bilaterally. Heart:  Regular rate and rhythm,  No murmur, rub, or gallop. 2s cap refill Abdomen: Soft, Non distended, Non tender, Positive bowel sounds Extremities: No cyanosis, clubbing or edema Neurologic:  No focal deficits Skin:   Dorsum L foot with 5cm rectangle of granulation tissue with 1cm light erythema, no warmth ASSESSMENT Active Hospital Problems Diagnosis Date Noted  Atrial fibrillation with rapid ventricular response ([address] 2018  Cellulitis of foot 2018  Pyoderma gangrenosum 2018  Hypokalemia 2018  Hypomagnesemia 2018  CKD (chronic kidney disease) 2018  Current chronic use of systemic steroids 2018  Essential hypertension 2017  Tobacco abuse 2017  Obesity, unspecified 2017 Plan: #Sepsis - resolved - soft tissue source - Lactic acid normalized. No signs osteo on XR.   
- BCx NGTD 24h  
- UCx skin tanvir #Cellulitis - Initially vanc/zosyn, clinically responding well. Deescalate therapy today to IV clinda; transition soon to PO if continues to improve #Pyoderma Gangrenosum - site of infection. Per wound care. Pt has follow-up with primary podiatrist Carli Ferguson) already scheduled for 9/11 #Afib RVR - per cards. Heparin, dilt drip. Attributed to sepsis. Infection resolving and still in afib. May be cardioverted. #HypoK, HypoMg - monitor and replace #TAYLOR - fluids, follow DVT Prophylaxis: heparin drip as above Signed By: Christian Montes MD   
 September 7, 2018

## 2018-09-08 LAB
ANION GAP SERPL CALC-SCNC: 8 MMOL/L (ref 7–16)
APTT PPP: 102 SEC (ref 23.2–35.3)
APTT PPP: 141.5 SEC (ref 23.2–35.3)
APTT PPP: 62.9 SEC (ref 23.2–35.3)
BACTERIA SPEC CULT: NORMAL
BUN SERPL-MCNC: 14 MG/DL (ref 6–23)
CALCIUM SERPL-MCNC: 7.9 MG/DL (ref 8.3–10.4)
CHLORIDE SERPL-SCNC: 111 MMOL/L (ref 98–107)
CO2 SERPL-SCNC: 24 MMOL/L (ref 21–32)
CREAT SERPL-MCNC: 1.35 MG/DL (ref 0.8–1.5)
ERYTHROCYTE [DISTWIDTH] IN BLOOD BY AUTOMATED COUNT: 15 %
GLUCOSE SERPL-MCNC: 111 MG/DL (ref 65–100)
HCT VFR BLD AUTO: 32.3 % (ref 41.1–50.3)
HGB BLD-MCNC: 10.5 G/DL (ref 13.6–17.2)
MAGNESIUM SERPL-MCNC: 1.9 MG/DL (ref 1.8–2.4)
MCH RBC QN AUTO: 29.1 PG (ref 26.1–32.9)
MCHC RBC AUTO-ENTMCNC: 32.5 G/DL (ref 31.4–35)
MCV RBC AUTO: 89.5 FL (ref 79.6–97.8)
NRBC # BLD: 0 K/UL (ref 0–0.2)
PLATELET # BLD AUTO: 297 K/UL (ref 150–450)
PMV BLD AUTO: 9.3 FL (ref 9.4–12.3)
POTASSIUM SERPL-SCNC: 3.8 MMOL/L (ref 3.5–5.1)
RBC # BLD AUTO: 3.61 M/UL (ref 4.23–5.6)
SERVICE CMNT-IMP: NORMAL
SODIUM SERPL-SCNC: 143 MMOL/L (ref 136–145)
WBC # BLD AUTO: 17.1 K/UL (ref 4.3–11.1)

## 2018-09-08 PROCEDURE — 74011000258 HC RX REV CODE- 258: Performed by: EMERGENCY MEDICINE

## 2018-09-08 PROCEDURE — 85730 THROMBOPLASTIN TIME PARTIAL: CPT

## 2018-09-08 PROCEDURE — 74011250636 HC RX REV CODE- 250/636: Performed by: EMERGENCY MEDICINE

## 2018-09-08 PROCEDURE — 77030020263 HC SOL INJ SOD CL0.9% LFCR 1000ML

## 2018-09-08 PROCEDURE — 74011250637 HC RX REV CODE- 250/637: Performed by: NURSE PRACTITIONER

## 2018-09-08 PROCEDURE — 74011250636 HC RX REV CODE- 250/636: Performed by: INTERNAL MEDICINE

## 2018-09-08 PROCEDURE — 74011250637 HC RX REV CODE- 250/637: Performed by: INTERNAL MEDICINE

## 2018-09-08 PROCEDURE — 74011000258 HC RX REV CODE- 258: Performed by: FAMILY MEDICINE

## 2018-09-08 PROCEDURE — 74011000250 HC RX REV CODE- 250: Performed by: INTERNAL MEDICINE

## 2018-09-08 PROCEDURE — 80048 BASIC METABOLIC PNL TOTAL CA: CPT

## 2018-09-08 PROCEDURE — 36415 COLL VENOUS BLD VENIPUNCTURE: CPT

## 2018-09-08 PROCEDURE — 74011000250 HC RX REV CODE- 250: Performed by: FAMILY MEDICINE

## 2018-09-08 PROCEDURE — 65660000000 HC RM CCU STEPDOWN

## 2018-09-08 PROCEDURE — 83735 ASSAY OF MAGNESIUM: CPT

## 2018-09-08 PROCEDURE — 74011636637 HC RX REV CODE- 636/637: Performed by: INTERNAL MEDICINE

## 2018-09-08 PROCEDURE — 85027 COMPLETE CBC AUTOMATED: CPT

## 2018-09-08 PROCEDURE — 74011250636 HC RX REV CODE- 250/636: Performed by: NURSE PRACTITIONER

## 2018-09-08 RX ORDER — LIDOCAINE 4 G/100G
1 PATCH TOPICAL EVERY 24 HOURS
Status: DISCONTINUED | OUTPATIENT
Start: 2018-09-08 | End: 2018-09-11 | Stop reason: HOSPADM

## 2018-09-08 RX ORDER — HEPARIN SODIUM 5000 [USP'U]/ML
35 INJECTION, SOLUTION INTRAVENOUS; SUBCUTANEOUS ONCE
Status: COMPLETED | OUTPATIENT
Start: 2018-09-08 | End: 2018-09-08

## 2018-09-08 RX ORDER — METOPROLOL TARTRATE 50 MG/1
50 TABLET ORAL EVERY 6 HOURS
Status: DISCONTINUED | OUTPATIENT
Start: 2018-09-08 | End: 2018-09-08

## 2018-09-08 RX ORDER — METOPROLOL TARTRATE 25 MG/1
25 TABLET, FILM COATED ORAL EVERY 6 HOURS
Status: DISCONTINUED | OUTPATIENT
Start: 2018-09-09 | End: 2018-09-10

## 2018-09-08 RX ORDER — FUROSEMIDE 10 MG/ML
40 INJECTION INTRAMUSCULAR; INTRAVENOUS ONCE
Status: COMPLETED | OUTPATIENT
Start: 2018-09-08 | End: 2018-09-08

## 2018-09-08 RX ORDER — CYCLOBENZAPRINE HCL 10 MG
5 TABLET ORAL
Status: DISCONTINUED | OUTPATIENT
Start: 2018-09-08 | End: 2018-09-11 | Stop reason: HOSPADM

## 2018-09-08 RX ADMIN — SODIUM CHLORIDE 900 MG: 900 INJECTION, SOLUTION INTRAVENOUS at 16:10

## 2018-09-08 RX ADMIN — LIDOCAINE: 40 CREAM TOPICAL at 21:12

## 2018-09-08 RX ADMIN — METOPROLOL TARTRATE 50 MG: 50 TABLET, FILM COATED ORAL at 08:00

## 2018-09-08 RX ADMIN — SODIUM CHLORIDE 900 MG: 900 INJECTION, SOLUTION INTRAVENOUS at 08:39

## 2018-09-08 RX ADMIN — METOPROLOL TARTRATE 50 MG: 50 TABLET, FILM COATED ORAL at 18:14

## 2018-09-08 RX ADMIN — SODIUM CHLORIDE 125 ML/HR: 900 INJECTION, SOLUTION INTRAVENOUS at 06:07

## 2018-09-08 RX ADMIN — DILTIAZEM HYDROCHLORIDE 90 MG: 60 TABLET, FILM COATED ORAL at 11:18

## 2018-09-08 RX ADMIN — HEPARIN SODIUM AND DEXTROSE 20 UNITS/KG/HR: 5000; 5 INJECTION INTRAVENOUS at 18:50

## 2018-09-08 RX ADMIN — OXYCODONE AND ACETAMINOPHEN 1 TABLET: 5; 325 TABLET ORAL at 06:15

## 2018-09-08 RX ADMIN — OXYCODONE AND ACETAMINOPHEN 1 TABLET: 5; 325 TABLET ORAL at 20:00

## 2018-09-08 RX ADMIN — HEPARIN SODIUM 4150 UNITS: 5000 INJECTION INTRAVENOUS; SUBCUTANEOUS at 06:13

## 2018-09-08 RX ADMIN — DILTIAZEM HYDROCHLORIDE 90 MG: 60 TABLET, FILM COATED ORAL at 16:10

## 2018-09-08 RX ADMIN — METOPROLOL TARTRATE 50 MG: 50 TABLET, FILM COATED ORAL at 13:05

## 2018-09-08 RX ADMIN — CYCLOBENZAPRINE HYDROCHLORIDE 5 MG: 10 TABLET, FILM COATED ORAL at 18:14

## 2018-09-08 RX ADMIN — LIDOCAINE: 40 CREAM TOPICAL at 08:46

## 2018-09-08 RX ADMIN — DILTIAZEM HYDROCHLORIDE 90 MG: 60 TABLET, FILM COATED ORAL at 21:11

## 2018-09-08 RX ADMIN — DILTIAZEM HYDROCHLORIDE 60 MG: 60 TABLET, FILM COATED ORAL at 06:00

## 2018-09-08 RX ADMIN — PREDNISONE 50 MG: 20 TABLET ORAL at 08:40

## 2018-09-08 RX ADMIN — SODIUM CHLORIDE 10 MG/HR: 900 INJECTION, SOLUTION INTRAVENOUS at 06:07

## 2018-09-08 RX ADMIN — OXYCODONE AND ACETAMINOPHEN 1 TABLET: 5; 325 TABLET ORAL at 13:05

## 2018-09-08 RX ADMIN — FUROSEMIDE 40 MG: 10 INJECTION, SOLUTION INTRAMUSCULAR; INTRAVENOUS at 11:18

## 2018-09-08 NOTE — PROGRESS NOTES
Dr. Vanna Child paged regarding patient's continued right sided neck pain and request for lidocaine patch. New order received.

## 2018-09-08 NOTE — PROGRESS NOTES
Pt c/o right shoulder/back muscle spasm. Reports flexeril has worked in the past and requests this drug. Dr Mitzi Kilpatrick Paged for orders. Dr Jabari Aaron returned page for Dr Mitzi Kilpatrick and orders received.  See [facility]

## 2018-09-08 NOTE — PROGRESS NOTES
13lb weight gain since admission, pitting edema to BLE noted this AM(not yesterday). Breaths sounds diminished(unchanged) Pt reports SOB 
RA sat 92% placed on 2L OK to stop NS per Dr Darvin Barton Lasix 40IVP once per Dr Noah Bobby 1230: pt reports breathing back to baseline and he removed his nasal cannula. 900ml urine out

## 2018-09-08 NOTE — PROGRESS NOTES
Hospitalist Progress Note 2018 Admit Date: 2018  7:44 PM  
NAME: Samuel Almodovar :  1960 MRN:  [id number] Attending: Lola Little MD 
PCP:  Axel Monroy MD 
 
SUBJECTIVE:  
Patient is 89RU with hx htn, pyoderma gangrenosum presented with sepsis and new afib Feeling well, no further chest discomfort or weakness. Improved pain/warmth in foot. No fevers/chills. Transitioning to PO dilt per cards currently Review of Systems negative with exception of pertinent positives noted above PHYSICAL EXAM  
 
Visit Vitals  BP (!) 152/92 (BP 1 Location: Left arm, BP Patient Position: At rest)  Pulse (!) 103  Temp 98.2 °F (36.8 °C)  Resp 20  
 Ht 6' 1\" (1.854 m)  Wt 118.8 kg (262 lb)  SpO2 99%  BMI 34.57 kg/m2 Temp (24hrs), Av.1 °F (36.7 °C), Min:97.7 °F (36.5 °C), Max:98.6 °F (37 °C) Oxygen Therapy O2 Sat (%): 99 % (18 0442) Pulse via Oximetry: 79 beats per minute (18 2200) O2 Device: Room air (18) Intake/Output Summary (Last 24 hours) at 18 4102 Last data filed at 18 0105 Gross per 24 hour Intake              640 ml Output              700 ml Net              -60 ml General: No acute distress   
Lungs:  CTA Bilaterally. Heart:  Regular rate and rhythm,  No murmur, rub, or gallop. 2s cap refill Abdomen: Soft, Non distended, Non tender, Positive bowel sounds Extremities: No cyanosis, clubbing or edema Neurologic:  No focal deficits Skin:   Dorsum L foot with 5cm rectangle of granulation tissue with pink base. No erythema, mild warmth ASSESSMENT Active Hospital Problems Diagnosis Date Noted  Atrial fibrillation with rapid ventricular response ([facility] [address] 2018  Cellulitis of foot 2018  Pyoderma gangrenosum 2018  Hypokalemia 2018  Hypomagnesemia 2018  CKD (chronic kidney disease) 2018  Current chronic use of systemic steroids 2018  Essential hypertension 11/07/2017  Tobacco abuse 09/20/2017  Obesity, unspecified 09/20/2017 Plan: 
 
#Cellulitis - Initially vanc/zosyn, now clinda. Clinically responding well, but leukocytosis trended up slightly. Plan to transition to PO tomorrow if continuing to improve. #Sepsis - resolved - soft tissue source - Lactic acid normalized. No signs osteo on XR.   
- BCx NGTD 24h  
- UCx skin tanvir #Pyoderma Gangrenosum - site of infection. Per wound care. Continue home prednisone. Pt has follow-up with primary podiatrist Seferino Aquino) already scheduled for 9/11 #Afib RVR - per cards. Heparin, dilt drip. Attributed to sepsis. Infection resolving and still in afib. May be cardioverted soon. Appreciate cards management. #HypoK, HypoMg - monitor and replace #TAYLOR - fluids, follow DVT Prophylaxis: heparin drip as above Signed By: Dano Ochoa MD   
 September 8, 2018

## 2018-09-08 NOTE — PROGRESS NOTES
Bedside and Verbal shift change report given to self (oncoming nurse) by Venkat Fajardo RN (offgoing nurse). Report included the following information SBAR, Kardex, MAR and Recent Results. Heparin gtt verified at bedside with offgoing RN.

## 2018-09-08 NOTE — PROGRESS NOTES
Bedside report received from [facility], [address] [facility].

## 2018-09-08 NOTE — PROGRESS NOTES
[facility] CARDIOLOGY PROGRESS NOTE 
      
 
9/8/2018 7:53 AM 
 
Admit Date: 9/5/2018 Subjective:  
Patient remains in atrial fibrillation with rates 100-120bpm 
 
ROS: 
Cardiovascular:  As noted above Objective:  
  
Vitals:  
 09/07/18 1630 09/07/18 2045 09/08/18 0157 09/08/18 4828 BP: 129/88 (!) 146/92 (!) 144/94 (!) 152/92 Pulse: 96 89 96 (!) 103 Resp: 20 18 18 20 Temp: 98 °F (36.7 °C) 98.1 °F (36.7 °C) 98.6 °F (37 °C) 98.2 °F (36.8 °C) SpO2: 96% 97% 96% 99% Weight:    118.8 kg (262 lb) Height:      
 
 
Physical Exam: 
General-No Acute Distress Neck- supple, no JVD 
CV- irregular rate and rhythm no MRG Lung- clear bilaterally Abd- soft, nontender, nondistended Ext- no edema bilaterally. Skin- warm and dry Data Review:  
Recent Labs  
   09/08/18 
 0417  09/07/18 
 4779 NA  143  142  
K  3.8  3.4* MG  1.9  1.6*  
BUN  14  15 CREA  1.35  1.49  
GLU  111*  127* WBC  17.1*  15.3* HGB  10.5*  10.8* HCT  32.3*  33.6* PLT  297  314 Assessment/Plan:  
 
Principal Problem: 
  Cellulitis of foot (9/5/2018) Per primary team  
 
Active Problems: 
  Obesity, unspecified (9/20/2017) Per primary team  
 
  Tobacco abuse (9/20/2017) Per primary team  
 
  Essential hypertension (11/7/2017) Per primary team  
 
  Atrial fibrillation with rapid ventricular response ([facility] [address] (9/5/2018) Plan rate control and heparin for now. He maybe a candidate for SHAE/eCV as he approaches DC. Start oral therapies and wean off of IV cardizem Pyoderma gangrenosum (9/5/2018) Per primary team  
 
  Hypokalemia (9/5/2018) Per primary team  
 
  Hypomagnesemia (9/5/2018) Per primary team  
 
  CKD (chronic kidney disease) (9/5/2018) Per primary team  
 
  Current chronic use of systemic steroids (9/5/2018)    Per primary team  
 
 
 
 
Brenda Casiano MD 
9/8/2018 7:53 AM

## 2018-09-09 LAB
ANION GAP SERPL CALC-SCNC: 16 MMOL/L (ref 7–16)
APTT PPP: 123.8 SEC (ref 23.2–35.3)
APTT PPP: 133.4 SEC (ref 23.2–35.3)
APTT PPP: 47 SEC (ref 23.2–35.3)
BUN SERPL-MCNC: 18 MG/DL (ref 6–23)
CALCIUM SERPL-MCNC: 8.7 MG/DL (ref 8.3–10.4)
CHLORIDE SERPL-SCNC: 105 MMOL/L (ref 98–107)
CO2 SERPL-SCNC: 21 MMOL/L (ref 21–32)
CREAT SERPL-MCNC: 1.61 MG/DL (ref 0.8–1.5)
ERYTHROCYTE [DISTWIDTH] IN BLOOD BY AUTOMATED COUNT: 15.4 %
GLUCOSE SERPL-MCNC: 109 MG/DL (ref 65–100)
HCT VFR BLD AUTO: 38.3 % (ref 41.1–50.3)
HGB BLD-MCNC: 11.9 G/DL (ref 13.6–17.2)
MCH RBC QN AUTO: 28.5 PG (ref 26.1–32.9)
MCHC RBC AUTO-ENTMCNC: 31.1 G/DL (ref 31.4–35)
MCV RBC AUTO: 91.8 FL (ref 79.6–97.8)
NRBC # BLD: 0.02 K/UL (ref 0–0.2)
PLATELET # BLD AUTO: 312 K/UL (ref 150–450)
PMV BLD AUTO: 9.8 FL (ref 9.4–12.3)
POTASSIUM SERPL-SCNC: 4.2 MMOL/L (ref 3.5–5.1)
RBC # BLD AUTO: 4.17 M/UL (ref 4.23–5.6)
SODIUM SERPL-SCNC: 142 MMOL/L (ref 136–145)
WBC # BLD AUTO: 19.7 K/UL (ref 4.3–11.1)

## 2018-09-09 PROCEDURE — 36415 COLL VENOUS BLD VENIPUNCTURE: CPT

## 2018-09-09 PROCEDURE — 85027 COMPLETE CBC AUTOMATED: CPT

## 2018-09-09 PROCEDURE — 80048 BASIC METABOLIC PNL TOTAL CA: CPT

## 2018-09-09 PROCEDURE — 74011250637 HC RX REV CODE- 250/637: Performed by: NURSE PRACTITIONER

## 2018-09-09 PROCEDURE — 74011250637 HC RX REV CODE- 250/637: Performed by: INTERNAL MEDICINE

## 2018-09-09 PROCEDURE — 74011250636 HC RX REV CODE- 250/636: Performed by: FAMILY MEDICINE

## 2018-09-09 PROCEDURE — 74011000250 HC RX REV CODE- 250: Performed by: INTERNAL MEDICINE

## 2018-09-09 PROCEDURE — 74011250636 HC RX REV CODE- 250/636: Performed by: INTERNAL MEDICINE

## 2018-09-09 PROCEDURE — 65660000000 HC RM CCU STEPDOWN

## 2018-09-09 PROCEDURE — 85730 THROMBOPLASTIN TIME PARTIAL: CPT

## 2018-09-09 PROCEDURE — 74011250636 HC RX REV CODE- 250/636: Performed by: NURSE PRACTITIONER

## 2018-09-09 PROCEDURE — 74011636637 HC RX REV CODE- 636/637: Performed by: INTERNAL MEDICINE

## 2018-09-09 PROCEDURE — 74011000258 HC RX REV CODE- 258: Performed by: FAMILY MEDICINE

## 2018-09-09 PROCEDURE — 74011000250 HC RX REV CODE- 250: Performed by: FAMILY MEDICINE

## 2018-09-09 RX ORDER — FUROSEMIDE 10 MG/ML
20 INJECTION INTRAMUSCULAR; INTRAVENOUS ONCE
Status: COMPLETED | OUTPATIENT
Start: 2018-09-09 | End: 2018-09-09

## 2018-09-09 RX ORDER — HEPARIN SODIUM 5000 [USP'U]/ML
4000 INJECTION, SOLUTION INTRAVENOUS; SUBCUTANEOUS ONCE
Status: COMPLETED | OUTPATIENT
Start: 2018-09-09 | End: 2018-09-09

## 2018-09-09 RX ORDER — FUROSEMIDE 10 MG/ML
40 INJECTION INTRAMUSCULAR; INTRAVENOUS ONCE
Status: COMPLETED | OUTPATIENT
Start: 2018-09-09 | End: 2018-09-09

## 2018-09-09 RX ADMIN — SODIUM CHLORIDE 900 MG: 900 INJECTION, SOLUTION INTRAVENOUS at 00:30

## 2018-09-09 RX ADMIN — DILTIAZEM HYDROCHLORIDE 90 MG: 60 TABLET, FILM COATED ORAL at 07:52

## 2018-09-09 RX ADMIN — OXYCODONE AND ACETAMINOPHEN 1 TABLET: 5; 325 TABLET ORAL at 16:11

## 2018-09-09 RX ADMIN — OXYCODONE AND ACETAMINOPHEN 1 TABLET: 5; 325 TABLET ORAL at 21:57

## 2018-09-09 RX ADMIN — FUROSEMIDE 40 MG: 10 INJECTION, SOLUTION INTRAMUSCULAR; INTRAVENOUS at 00:50

## 2018-09-09 RX ADMIN — LIDOCAINE: 40 CREAM TOPICAL at 18:21

## 2018-09-09 RX ADMIN — LIDOCAINE: 40 CREAM TOPICAL at 10:02

## 2018-09-09 RX ADMIN — DILTIAZEM HYDROCHLORIDE 90 MG: 60 TABLET, FILM COATED ORAL at 16:04

## 2018-09-09 RX ADMIN — METOPROLOL TARTRATE 25 MG: 25 TABLET ORAL at 18:15

## 2018-09-09 RX ADMIN — OXYCODONE AND ACETAMINOPHEN 1 TABLET: 5; 325 TABLET ORAL at 08:31

## 2018-09-09 RX ADMIN — DILTIAZEM HYDROCHLORIDE 90 MG: 60 TABLET, FILM COATED ORAL at 11:20

## 2018-09-09 RX ADMIN — FUROSEMIDE 20 MG: 10 INJECTION, SOLUTION INTRAMUSCULAR; INTRAVENOUS at 11:25

## 2018-09-09 RX ADMIN — CEFEPIME HYDROCHLORIDE 2 G: 2 INJECTION, POWDER, FOR SOLUTION INTRAVENOUS at 10:02

## 2018-09-09 RX ADMIN — PREDNISONE 50 MG: 20 TABLET ORAL at 08:25

## 2018-09-09 RX ADMIN — METOPROLOL TARTRATE 25 MG: 25 TABLET ORAL at 08:25

## 2018-09-09 RX ADMIN — HEPARIN SODIUM AND DEXTROSE 16 UNITS/KG/HR: 5000; 5 INJECTION INTRAVENOUS at 12:51

## 2018-09-09 RX ADMIN — METOPROLOL TARTRATE 25 MG: 25 TABLET ORAL at 11:24

## 2018-09-09 RX ADMIN — CEFEPIME HYDROCHLORIDE 2 G: 2 INJECTION, POWDER, FOR SOLUTION INTRAVENOUS at 21:48

## 2018-09-09 RX ADMIN — DILTIAZEM HYDROCHLORIDE 90 MG: 60 TABLET, FILM COATED ORAL at 21:48

## 2018-09-09 RX ADMIN — HEPARIN SODIUM 4000 UNITS: 5000 INJECTION INTRAVENOUS; SUBCUTANEOUS at 14:38

## 2018-09-09 NOTE — PROGRESS NOTES
Bedside and Verbal shift change report given to Lesli Gaston RN (oncoming nurse) by self Herminio Ferguson nurse). Report included the following information SBAR, Kardex, MAR and Recent Results.

## 2018-09-09 NOTE — PROGRESS NOTES
Patient currently a fib 60-70s with non-sustained drops to 30s. Agus Larios NP notified. New orders recieved.

## 2018-09-09 NOTE — PROGRESS NOTES
[facility] CARDIOLOGY PROGRESS NOTE 
      
 
9/9/2018 8:02 AM 
 
Admit Date: 9/5/2018 Subjective: No complaints overnight. Had one episode of slow ventricular response overnight ROS: 
Cardiovascular:  As noted above Objective:  
  
Vitals:  
 09/08/18 2036 09/09/18 0031 09/09/18 0440 09/09/18 0793 BP: 122/83 (!) 144/108 108/86 (!) 164/110 Pulse: 73 67 98 (!) 118 Resp: 20 22 20 Temp: 98.4 °F (36.9 °C) 97 °F (36.1 °C) 98 °F (36.7 °C) SpO2: 94% 93% 92% Weight:   116.5 kg (256 lb 14.4 oz) Height:      
 
 
Physical Exam: 
General-No Acute Distress Neck- supple, no JVD 
CV- irregular, irregular, tachycardic Lung- clear bilaterally Abd- soft, nontender, nondistended Ext- no edema bilaterally. --left foot wound Skin- warm and dry Data Review:  
Recent Labs  
   09/09/18 
 0424  09/08/18 
 0417  09/07/18 
 8789 NA  142  143  142  
K  4.2  3.8  3.4* MG   --   1.9  1.6*  
BUN  18  14  15 CREA  1.61*  1.35  1.49  
GLU  109*  111*  127* WBC  19.7*  17.1*  15.3* HGB  11.9*  10.5*  10.8* HCT  38.3*  32.3*  33.6*  
PLT  312  297  314 Assessment/Plan:  
 
Principal Problem: 
  Cellulitis of foot (9/5/2018) 
abx per IM Active Problems: 
  Obesity, unspecified (9/20/2017) Tobacco abuse (9/20/2017) Cessation encouraged Essential hypertension (11/7/2017) Elevated at present. Suspect slow ventricular response on afib in middle of night due to possible KATHERIN--will continue cardizem and BB Atrial fibrillation with rapid ventricular response ([address] (9/5/2018) As above, on IV heparin --with switch to oral anticoagulation if no surgery planned. Rate control not ideal will continue current meds. Pyoderma gangrenosum (9/5/2018) Hypokalemia (9/5/2018) 
resolved Hypomagnesemia (9/5/2018) Resolved CKD (chronic kidney disease) (9/5/2018) 
crt slight increase Current chronic use of systemic steroids (9/5/2018) Fatmata Alvarez NP 
9/9/2018 8:02 AM

## 2018-09-09 NOTE — PROGRESS NOTES
Bedside and Verbal shift change report given to self (oncoming nurse) by Beulah Romero RN (offgoing nurse). Report included the following information SBAR, Kardex, MAR and Recent Results.

## 2018-09-09 NOTE — PROGRESS NOTES
Monitor room called pt had 3 second pause; relayed to Dr. Mouna Marti; no orders received at this time Will continue to monitor closely

## 2018-09-09 NOTE — PROGRESS NOTES
Hospitalist Progress Note 2018 Admit Date: 2018  7:44 PM  
NAME: Luis Felipe Roberto :  1960 MRN:  [id number] Attending: Dania Grant MD 
PCP:  Trupti Proctor MD 
 
SUBJECTIVE:  
Patient is 33OZ with hx htn, pyoderma gangrenosum presented with sepsis and new afib Episode of dyspnea overnight resolved with lasix. Pt reports some more soreness in foot after walking on it more and increased warmth. Review of Systems negative with exception of pertinent positives noted above PHYSICAL EXAM  
 
Visit Vitals  /86 (BP 1 Location: Right arm, BP Patient Position: At rest)  Pulse 98  Temp 98 °F (36.7 °C)  Resp 20  
 Ht 6' 1\" (1.854 m)  Wt 116.5 kg (256 lb 14.4 oz)  SpO2 92%  BMI 33.89 kg/m2 Temp (24hrs), Av.8 °F (36.6 °C), Min:96.5 °F (35.8 °C), Max:98.7 °F (37.1 °C) Oxygen Therapy O2 Sat (%): 92 % (18 0440) Pulse via Oximetry: 79 beats per minute (18 2200) O2 Device: Room air (18 1935) Intake/Output Summary (Last 24 hours) at 18 0740 Last data filed at 18 5479 Gross per 24 hour Intake                0 ml Output             3820 ml Net            -3820 ml General: No acute distress   
Lungs:  CTA Bilaterally. Heart:  Regular rate and rhythm,  No murmur, rub, or gallop. 2s cap refill Abdomen: Soft, Non distended, Non tender, Positive bowel sounds Extremities: No cyanosis, clubbing or edema Neurologic:  No focal deficits Skin:   Dorsum L foot with 5cm rectangle of granulation tissue with pink base. Warm, 2cm erythema ASSESSMENT Active Hospital Problems Diagnosis Date Noted  Atrial fibrillation with rapid ventricular response ([facility] [address] 2018  Cellulitis of foot 2018  Pyoderma gangrenosum 2018  Hypokalemia 2018  Hypomagnesemia 2018  CKD (chronic kidney disease) 2018  Current chronic use of systemic steroids 2018  Essential hypertension 11/07/2017  Tobacco abuse 09/20/2017  Obesity, unspecified 09/20/2017 Plan: 
 
#Cellulitis - Initially vanc/zosyn, then deescalated to clinda with worsening local infectious signs. WBC trending back up. Cefepime. Consider ID consult if not improving. #Sepsis - resolved - soft tissue source - Lactic acid normalized. No signs osteo on XR.   
- BCx NGTD 24h  
- UCx skin tanvir #Pyoderma Gangrenosum - site of infection. Per wound care. Continue home prednisone. Pt has follow-up with primary podiatrist Jam Ramos) already scheduled for 9/11 #Afib RVR - per cards. Heparin drip, dilt. Attributed to sepsis. Possible cardioversion tomorrow. Appreciate cards management. #HypoK, HypoMg - monitor and replace #TAYLOR - fluids, follow DVT Prophylaxis: heparin drip as above Signed By: Robles Ramsey MD   
 September 9, 2018

## 2018-09-09 NOTE — PROGRESS NOTES
Monitor notified about 3 second pause Patient is asymptomatic Dr. Fernando Ingram notified with no orders at this time Will continue to monitor closely

## 2018-09-09 NOTE — PROGRESS NOTES
Patient had 3 second pause. Patient asymptomatic- resting in bed. Jeremy Tristan NP notified. New order received to hold Lopressor 0600.

## 2018-09-09 NOTE — PROGRESS NOTES
Patient complaning of increase SOB. Patient tachypenic, O2 sat 93% on room air. Lungs sounds diminished. Dr. Sanford Tovar notified- new order received.

## 2018-09-09 NOTE — PROGRESS NOTES
Problem: Falls - Risk of 
Goal: *Absence of Falls Document Julian Beady Fall Risk and appropriate interventions in the flowsheet. Outcome: Progressing Towards Goal 
Fall Risk Interventions: 
Mobility Interventions: Bed/chair exit alarm Medication Interventions: Bed/chair exit alarm Elimination Interventions: Call light in reach History of Falls Interventions: Bed/chair exit alarm

## 2018-09-09 NOTE — PROGRESS NOTES
Bedside and Verbal shift change report given to ZUHAIR Cintron (oncoming nurse) by self (offgoing nurse). Report included the following information SBAR, Kardex, MAR and Recent Results.

## 2018-09-09 NOTE — PROGRESS NOTES
Bedside and Verbal shift change report given to self (oncoming nurse) by [facility], RN (offgoing nurse). Report included the following information SBAR, Kardex, MAR and Recent Results. Visualized Heparin GTT, rate change, line and ordered PTT

## 2018-09-09 NOTE — PROGRESS NOTES
Patient complained of SOB; spoke with primary MD, Dougie Adamson; verbal order taken and ordered 20ml Lasix IV Will continue to monitor closely

## 2018-09-10 LAB
ANION GAP SERPL CALC-SCNC: 8 MMOL/L (ref 7–16)
APTT PPP: 51.2 SEC (ref 23.2–35.3)
ATRIAL RATE: 92 BPM
BUN SERPL-MCNC: 23 MG/DL (ref 6–23)
CALCIUM SERPL-MCNC: 8.9 MG/DL (ref 8.3–10.4)
CALCULATED P AXIS, ECG09: 55 DEGREES
CALCULATED R AXIS, ECG10: 32 DEGREES
CALCULATED T AXIS, ECG11: 75 DEGREES
CHLORIDE SERPL-SCNC: 104 MMOL/L (ref 98–107)
CO2 SERPL-SCNC: 27 MMOL/L (ref 21–32)
CREAT SERPL-MCNC: 1.44 MG/DL (ref 0.8–1.5)
DIAGNOSIS, 93000: NORMAL
ERYTHROCYTE [DISTWIDTH] IN BLOOD BY AUTOMATED COUNT: 15 %
GLUCOSE SERPL-MCNC: 109 MG/DL (ref 65–100)
HCT VFR BLD AUTO: 34.9 % (ref 41.1–50.3)
HGB BLD-MCNC: 11.4 G/DL (ref 13.6–17.2)
MCH RBC QN AUTO: 29.2 PG (ref 26.1–32.9)
MCHC RBC AUTO-ENTMCNC: 32.7 G/DL (ref 31.4–35)
MCV RBC AUTO: 89.3 FL (ref 79.6–97.8)
NRBC # BLD: 0.04 K/UL (ref 0–0.2)
P-R INTERVAL, ECG05: 152 MS
PLATELET # BLD AUTO: 337 K/UL (ref 150–450)
PMV BLD AUTO: 9.5 FL (ref 9.4–12.3)
POTASSIUM SERPL-SCNC: 3.7 MMOL/L (ref 3.5–5.1)
Q-T INTERVAL, ECG07: 372 MS
QRS DURATION, ECG06: 94 MS
QTC CALCULATION (BEZET), ECG08: 460 MS
RBC # BLD AUTO: 3.91 M/UL (ref 4.23–5.6)
SODIUM SERPL-SCNC: 139 MMOL/L (ref 136–145)
VENTRICULAR RATE, ECG03: 92 BPM
WBC # BLD AUTO: 17.9 K/UL (ref 4.3–11.1)

## 2018-09-10 PROCEDURE — 74011250637 HC RX REV CODE- 250/637: Performed by: PHYSICIAN ASSISTANT

## 2018-09-10 PROCEDURE — 74011250637 HC RX REV CODE- 250/637: Performed by: INTERNAL MEDICINE

## 2018-09-10 PROCEDURE — 99152 MOD SED SAME PHYS/QHP 5/>YRS: CPT

## 2018-09-10 PROCEDURE — 92960 CARDIOVERSION ELECTRIC EXT: CPT

## 2018-09-10 PROCEDURE — 85730 THROMBOPLASTIN TIME PARTIAL: CPT

## 2018-09-10 PROCEDURE — 74011250637 HC RX REV CODE- 250/637: Performed by: NURSE PRACTITIONER

## 2018-09-10 PROCEDURE — B24BZZ4 ULTRASONOGRAPHY OF HEART WITH AORTA, TRANSESOPHAGEAL: ICD-10-PCS | Performed by: INTERNAL MEDICINE

## 2018-09-10 PROCEDURE — 74011250636 HC RX REV CODE- 250/636: Performed by: INTERNAL MEDICINE

## 2018-09-10 PROCEDURE — 74011000250 HC RX REV CODE- 250: Performed by: INTERNAL MEDICINE

## 2018-09-10 PROCEDURE — 77030029488 HC ELECTRD PAD DEFIB ZOLL -B

## 2018-09-10 PROCEDURE — 93005 ELECTROCARDIOGRAM TRACING: CPT | Performed by: INTERNAL MEDICINE

## 2018-09-10 PROCEDURE — 74011636637 HC RX REV CODE- 636/637: Performed by: INTERNAL MEDICINE

## 2018-09-10 PROCEDURE — 74011250636 HC RX REV CODE- 250/636: Performed by: FAMILY MEDICINE

## 2018-09-10 PROCEDURE — 74011250636 HC RX REV CODE- 250/636

## 2018-09-10 PROCEDURE — 74011000258 HC RX REV CODE- 258: Performed by: FAMILY MEDICINE

## 2018-09-10 PROCEDURE — 74011000258 HC RX REV CODE- 258: Performed by: INTERNAL MEDICINE

## 2018-09-10 PROCEDURE — 36415 COLL VENOUS BLD VENIPUNCTURE: CPT

## 2018-09-10 PROCEDURE — 85027 COMPLETE CBC AUTOMATED: CPT

## 2018-09-10 PROCEDURE — 65660000000 HC RM CCU STEPDOWN

## 2018-09-10 PROCEDURE — 5A2204Z RESTORATION OF CARDIAC RHYTHM, SINGLE: ICD-10-PCS | Performed by: INTERNAL MEDICINE

## 2018-09-10 PROCEDURE — 80048 BASIC METABOLIC PNL TOTAL CA: CPT

## 2018-09-10 PROCEDURE — 93312 ECHO TRANSESOPHAGEAL: CPT

## 2018-09-10 RX ORDER — CEPHALEXIN 500 MG/1
500 CAPSULE ORAL EVERY 6 HOURS
Status: DISCONTINUED | OUTPATIENT
Start: 2018-09-10 | End: 2018-09-11 | Stop reason: HOSPADM

## 2018-09-10 RX ORDER — AMIODARONE HYDROCHLORIDE 200 MG/1
400 TABLET ORAL 2 TIMES DAILY
Status: DISCONTINUED | OUTPATIENT
Start: 2018-09-10 | End: 2018-09-11 | Stop reason: HOSPADM

## 2018-09-10 RX ORDER — MIDAZOLAM HYDROCHLORIDE 1 MG/ML
.5-5 INJECTION, SOLUTION INTRAMUSCULAR; INTRAVENOUS
Status: DISCONTINUED | OUTPATIENT
Start: 2018-09-10 | End: 2018-09-11 | Stop reason: HOSPADM

## 2018-09-10 RX ORDER — HEPARIN SODIUM 5000 [USP'U]/ML
35 INJECTION, SOLUTION INTRAVENOUS; SUBCUTANEOUS ONCE
Status: ACTIVE | OUTPATIENT
Start: 2018-09-10 | End: 2018-09-10

## 2018-09-10 RX ORDER — CHLORPHENIRAMINE MALEATE 4 MG
TABLET ORAL 2 TIMES DAILY
Status: DISCONTINUED | OUTPATIENT
Start: 2018-09-10 | End: 2018-09-11 | Stop reason: HOSPADM

## 2018-09-10 RX ORDER — LIDOCAINE HYDROCHLORIDE 20 MG/ML
15 SOLUTION OROPHARYNGEAL AS NEEDED
Status: DISCONTINUED | OUTPATIENT
Start: 2018-09-10 | End: 2018-09-11 | Stop reason: HOSPADM

## 2018-09-10 RX ORDER — FENTANYL CITRATE 50 UG/ML
25-100 INJECTION, SOLUTION INTRAMUSCULAR; INTRAVENOUS
Status: DISCONTINUED | OUTPATIENT
Start: 2018-09-10 | End: 2018-09-11 | Stop reason: HOSPADM

## 2018-09-10 RX ADMIN — MIDAZOLAM HYDROCHLORIDE 2 MG: 1 INJECTION, SOLUTION INTRAMUSCULAR; INTRAVENOUS at 16:09

## 2018-09-10 RX ADMIN — FENTANYL CITRATE 25 MCG: 50 INJECTION, SOLUTION INTRAMUSCULAR; INTRAVENOUS at 16:07

## 2018-09-10 RX ADMIN — OXYCODONE AND ACETAMINOPHEN 1 TABLET: 5; 325 TABLET ORAL at 09:12

## 2018-09-10 RX ADMIN — DEXTROSE 150 MG: 50 INJECTION, SOLUTION INTRAVENOUS at 16:18

## 2018-09-10 RX ADMIN — OXYCODONE AND ACETAMINOPHEN 1 TABLET: 5; 325 TABLET ORAL at 18:42

## 2018-09-10 RX ADMIN — METOPROLOL TARTRATE 25 MG: 25 TABLET ORAL at 01:20

## 2018-09-10 RX ADMIN — MIDAZOLAM HYDROCHLORIDE 2 MG: 1 INJECTION, SOLUTION INTRAMUSCULAR; INTRAVENOUS at 16:05

## 2018-09-10 RX ADMIN — APIXABAN 5 MG: 5 TABLET, FILM COATED ORAL at 09:12

## 2018-09-10 RX ADMIN — PREDNISONE 50 MG: 20 TABLET ORAL at 09:12

## 2018-09-10 RX ADMIN — APIXABAN 5 MG: 5 TABLET, FILM COATED ORAL at 21:38

## 2018-09-10 RX ADMIN — LIDOCAINE HYDROCHLORIDE 15 ML: 20 SOLUTION ORAL; TOPICAL at 14:00

## 2018-09-10 RX ADMIN — LIDOCAINE: 40 CREAM TOPICAL at 21:39

## 2018-09-10 RX ADMIN — CEFEPIME HYDROCHLORIDE 2 G: 2 INJECTION, POWDER, FOR SOLUTION INTRAVENOUS at 09:14

## 2018-09-10 RX ADMIN — MIDAZOLAM HYDROCHLORIDE 2 MG: 1 INJECTION, SOLUTION INTRAMUSCULAR; INTRAVENOUS at 16:07

## 2018-09-10 RX ADMIN — CLOTRIMAZOLE: 10 CREAM TOPICAL at 21:30

## 2018-09-10 RX ADMIN — FENTANYL CITRATE 50 MCG: 50 INJECTION, SOLUTION INTRAMUSCULAR; INTRAVENOUS at 16:05

## 2018-09-10 RX ADMIN — AMIODARONE HYDROCHLORIDE 400 MG: 200 TABLET ORAL at 18:38

## 2018-09-10 RX ADMIN — CEPHALEXIN 500 MG: 500 CAPSULE ORAL at 12:48

## 2018-09-10 RX ADMIN — HEPARIN SODIUM AND DEXTROSE 18 UNITS/KG/HR: 5000; 5 INJECTION INTRAVENOUS at 03:17

## 2018-09-10 RX ADMIN — MIDAZOLAM HYDROCHLORIDE 2 MG: 1 INJECTION, SOLUTION INTRAMUSCULAR; INTRAVENOUS at 16:12

## 2018-09-10 RX ADMIN — CEPHALEXIN 500 MG: 500 CAPSULE ORAL at 18:38

## 2018-09-10 RX ADMIN — LIDOCAINE: 40 CREAM TOPICAL at 09:23

## 2018-09-10 NOTE — CONSULTS
Infectious Disease Consult    Today's Date: 9/10/2018   Admit Date: 9/5/2018    Impression:   · Atrial fibrillation with RVR  · Syncope  · Pyoderma gangrenosum  · Leukocytosis due to high dose steroids  · Hypertension  · Obesity  · CKD    Plan:   · Overall, his presentation in retrospect is not consistent with sepsis. His left foot exam is not concerning for a source of sepsis. His tachycardia and leukocytosis are explained by other means - he is at high risk for Afib regardless of sepsis due to the high dose prednisone, obesity, and poorly controlled hypertension; he also temporarily held prednisone for 2 days after he passed out and this could have precipitated rapid afib; his leukocytosis is likely due to steroids. Clinically, he has been observed in the hospital for 5 days without signs of worsening sepsis. · Stop cefepime. Transition to oral keflex for 3 additional days to treat cellulitis assuming it was present (although it seems that even on admission we were skeptical). · Start topical antifungal for erythema on outer wound. · Pyoderma will often have sterile purulence in the wound base so would not send a wound culture. · ID will not continue to follow the patient. Please call us back with questions or concerns. Anti-infectives:   ·     Subjective:   Date of Consultation:  September 10, 2018  Referring Physician: Dr. Tomy Thomas    Patient is a 59 y.o. male who presented to [facility] for evaluation of rapid atrial fibrillation after he presented with malaise and fatigue. In the preceding days he had had problems with passing out, and held his beta-blocker and steroids of his own volition. He has a h/o pyoderma gangrenosusm of the left foot and is followed by Dr. Sherren Splinter in podiatry and takes prednisone 50mg daily. He was noted to have mild cellulitis of the left foot and started on vanc/zosyn on admission. He had leukocytosis on presentation and blood cultures have remained negative.   He has been on IV heparin for Afib and is planned for SHAE/cardioversion today. He was transitioned to oral clindamycin but has had some worsening erythema at dorsal left foot wound. He was changed to cefepime yesterday due to this. ID is consulted for further recommendations. Patient Active Problem List   Diagnosis Code    Poorly-controlled hypertension I10    Obesity, unspecified E66.9    Tobacco abuse Z72.0    Essential hypertension I10    Atrial fibrillation with rapid ventricular response ([facility]) I48.91    Cellulitis of foot L03.119    Pyoderma gangrenosum L88    Hypokalemia E87.6    Hypomagnesemia E83.42    CKD (chronic kidney disease) N18.9    Current chronic use of systemic steroids Z79.52     Past Medical History:   Diagnosis Date    Atrial fibrillation with rapid ventricular response ([facility] [address] 9/5/2018    Hypertension 2007    Pyoderma gangrenosum 9/5/2018      Family History   Problem Relation Age of Onset    Arthritis-osteo Mother     Asthma Mother     Cancer Mother     Hypertension Mother     Stroke Father     Arthritis-osteo Maternal Grandmother     Arthritis-osteo Maternal Grandfather     Hypertension Maternal Grandfather     Stroke Maternal Grandfather     Arthritis-osteo Paternal Grandmother     Diabetes Paternal Grandmother     Arthritis-osteo Paternal Grandfather     Heart Disease Paternal Grandfather     Stroke Paternal Grandfather       Social History   Substance Use Topics    Smoking status: Current Every Day Smoker     Types: Cigarettes    Smokeless tobacco: Never Used    Alcohol use 0.6 oz/week     1 Glasses of wine per week     History reviewed. No pertinent surgical history. Prior to Admission medications    Medication Sig Start Date End Date Taking? Authorizing Provider   sodium hyaluronate (BIONECT) 0.2 % topical cream apply to affected area on lt. foot twice.  7/2/18  Yes Historical Provider   oxyCODONE-acetaminophen (PERCOCET) 5-325 mg per tablet take 1 tablet by oral route  every 6 hours as needed FOR PAIN 18  Yes Historical Provider   predniSONE (DELTASONE) 50 mg tablet Take 1 Tab by mouth. 18  Yes Historical Provider   metoprolol succinate (TOPROL-XL) 100 mg tablet Take 100 mg by mouth daily. Yes Historical Provider   Olmesartan-amLODIPine-HCTZ [facility]) 40-10-25 mg tab Take 1 Tab by mouth daily. 17  Yes Araceli Snow MD   sildenafil citrate (VIAGRA) 100 mg tablet Take 1 Tab by mouth as needed. 17   Araceli Snow MD       No Known Allergies     Review of Systems:  A comprehensive review of systems was negative except for that written in the History of Present Illness. Objective:     Visit Vitals    /84    Pulse 97    Temp 95.5 °F (35.3 °C)    Resp 20    Ht 6' 1\" (1.854 m)    Wt 116.2 kg (256 lb 3.2 oz)    SpO2 99%    BMI 33.8 kg/m2     Temp (24hrs), Av.8 °F (36 °C), Min:95.5 °F (35.3 °C), Max:97.4 °F (36.3 °C)       Lines:  Peripheral IV:            Physical Exam:    General:  Alert, cooperative, obese white male in no acute distress   Eyes:  Sclera anicteric. Pupils equally round and reactive to light. Mouth/Throat: Mucous membranes normal, oral pharynx clear ;fair dentition   Neck: Supple   Lungs:   Clear to auscultation bilaterally, good effort   CV:  Regular rate and rhythm,no murmur, click, rub or gallop   Abdomen:   Soft, non-tender.  bowel sounds normal. non-distended   Extremities: No cyanosis or edema   Skin: Dorsal left foot with pyoderma noted; minimal surrounding erythema   Lymph nodes: Cervical and supraclavicular normal   Musculoskeletal: No swelling or deformity   Lines/Devices:  Intact, no erythema, drainage or tenderness   Psych: Alert and oriented, normal mood affect given the setting       Data Review:     CBC:  Recent Labs      09/10/18   0514  18   0424  18   0417   WBC  17.9*  19.7*  17.1*   HGB  11.4*  11.9*  10.5*   HCT  34.9*  38.3*  32.3*   PLT  337  312  297       BMP:  Recent Labs 09/10/18   0514  18   0424  18   0417   CREA  1.44  1.61*  1.35   BUN  23  18  14   NA  139  142  143   K  3.7  4.2  3.8   CL  104  105  111*   CO2  27  21  24   AGAP  8  16  8   GLU  109*  109*  111*       LFTS:  No results for input(s): TBILI, ALT, SGOT, AP, TP, ALB in the last 72 hours. Microbiology:     All Micro Results     Procedure Component Value Units Date/Time    CULTURE, BLOOD [[id number]] Collected:  18 0354    Order Status:  Completed Specimen:  Blood from Blood Updated:  09/10/18 0624     Special Requests: --        LEFT  ARM       Culture result: NO GROWTH 4 DAYS       CULTURE, BLOOD [[id number]] Collected:  18 0007    Order Status:  Completed Specimen:  Blood from Blood Updated:  09/10/18 0624     Special Requests: r hand     Culture result: NO GROWTH 4 DAYS       CULTURE, URINE [[id number]] Collected:  18 2300    Order Status:  Completed Specimen:  Urine from Clean catch Updated:  1835     Special Requests: NO SPECIAL REQUESTS        Culture result:         <10,000 COLONIES/mL MIXED SKIN SANGEETA ISOLATED          Imagin/5/18 L foot plain film: IMPRESSION  IMPRESSION: Soft tissue swelling without radiographic evidence of osteomyelitis. 18 CXR: IMPRESSION: Normal chest.    18 TTE: SUMMARY:    -  Left ventricle: Systolic function was normal. Ejection fraction was  estimated in the range of 60 % to 65 %. There were no regional wall motion  abnormalities. -  Left atrium: The atrium was mildly dilated.     Signed By: Gamal Allred MD     September 10, 2018

## 2018-09-10 NOTE — PROGRESS NOTES
Problem: Falls - Risk of 
Goal: *Absence of Falls Document Antoine Mendez Fall Risk and appropriate interventions in the flowsheet. Outcome: Progressing Towards Goal 
Fall Risk Interventions: 
Mobility Interventions: Patient to call before getting OOB Medication Interventions: Patient to call before getting OOB Elimination Interventions: Call light in reach History of Falls Interventions: Evaluate medications/consider consulting pharmacy

## 2018-09-10 NOTE — PROGRESS NOTES
SHAE/CVN Dr Lewis Soliz. ASA II mallampati II. Versed 8mg fentanyl 75mcg. Pt now nsr. Pt tolerated procedure well. 200j synced x 1.

## 2018-09-10 NOTE — PROGRESS NOTES
Bedside and Verbal shift change report given to self (oncoming nurse) by Jered Sommer RN (offgoing nurse). Report included the following information SBAR, Kardex, MAR and Recent Results.

## 2018-09-10 NOTE — PROGRESS NOTES
Patient with 3 and 6 second pause. STAT pads applied and zoll at bedside. Nabeel Barroso NP notified. Telemetry strips placed on chart.

## 2018-09-10 NOTE — PROGRESS NOTES
Bedside and Verbal shift change report given to Self and Ashwin RNs (oncoming nurse) by Leydi Willoughby RN (offgoing nurse). Report included the following information SBAR, Kardex, Intake/Output, MAR, Recent Results and Cardiac Rhythm A Fib. Heparin gtt verified with offgoing RN.

## 2018-09-10 NOTE — PROGRESS NOTES
Bedside and Verbal shift change report given to Woody Willson RN (oncoming nurse) by self Fela harrington). Report included the following information SBAR, Kardex, MAR and Recent Results.  Heparin gtt verified at bedside with oncoming RN

## 2018-09-10 NOTE — PROGRESS NOTES
Patient to have SHAE/Cardioversion today and ID consult for antibiotic options. Patient current d/c plan is home with his son. CM following for possible antibiotics and Home health needs.

## 2018-09-10 NOTE — PROGRESS NOTES
Bedside and Verbal shift change report given to Leydi Willoughby RN (oncoming nurse) by Angelo Arvizu and Self, RNs (offgoing nurse). Report included the following information SBAR, Kardex, Procedure Summary, Intake/Output, MAR, Recent Results and Cardiac Rhythm NSR to ST.

## 2018-09-10 NOTE — PROGRESS NOTES
[facility] CARDIOLOGY PROGRESS NOTE 
      
 
9/10/2018 8:02 AM 
 
Admit Date: 9/5/2018 Subjective: No complaints overnight. Has had ongoing prolong slow AF complicated by RVR - 3-6 second pauses ROS: 
Cardiovascular:  As noted above Objective:  
  
Vitals:  
 09/09/18 1700 09/09/18 2139 09/10/18 0121 09/10/18 0445 BP: 149/78 (!) 180/102 (!) 160/93 135/88 Pulse: 86 96 94 79 Resp: 16 18 20 22 Temp: 97.4 °F (36.3 °C) 96.8 °F (36 °C) 97.1 °F (36.2 °C) 96.8 °F (36 °C) SpO2: 96% 98% 98% 97% Weight:    116.2 kg (256 lb 3.2 oz) Height:      
 
 
Physical Exam: 
General-No Acute Distress Neck- supple, no JVD 
CV- irregular, irregular, tachycardic Lung- clear bilaterally Abd- soft, nontender, nondistended Ext- no edema bilaterally. --left foot wound Skin- warm and dry Data Review:  
Recent Labs  
   09/10/18 
 0514  09/09/18 
 0424  09/08/18 
 9063 NA  139  142  143  
K  3.7  4.2  3.8 MG   --    --   1.9 BUN  23  18  14 CREA  1.44  1.61*  1.35  
GLU  109*  109*  111* WBC  17.9*  19.7*  17.1* HGB  11.4*  11.9*  10.5* HCT  34.9*  38.3*  32.3*  
PLT  337  312  297 Assessment/Plan:  
 
Principal Problem: 
  Cellulitis of foot (9/5/2018) 
abx per IM Active Problems: 
  Obesity, unspecified (9/20/2017) Tobacco abuse (9/20/2017) Cessation encouraged Essential hypertension (11/7/2017) Elevated at present. Suspect slow ventricular response on afib in middle of night due to possible KATHERIN- Atrial fibrillation with rapid ventricular response ([address] (9/5/2018) As above, on IV heparin --with switch to oral anticoagulation today. Eliquis 5mg BID. Give severe marilia complicated by tachy will plan SHAE/eCV and then add antiarrhythmic if stable. He is a poor candidate for PM with chronic infections. Pyoderma gangrenosum (9/5/2018) Hypokalemia (9/5/2018) 
resolved Hypomagnesemia (9/5/2018) Resolved CKD (chronic kidney disease) (9/5/2018) 
crt slight increase Current chronic use of systemic steroids (9/5/2018) Darvin Ochoa MD 
9/10/2018 8:02 AM

## 2018-09-10 NOTE — PROGRESS NOTES
TRANSFER - IN REPORT: 
 
Verbal report received from Geovanny Chung RN (name) on [facility]  being received from CCL (unit) for routine progression of care Report consisted of patients Situation, Background, Assessment and  
Recommendations(SBAR). Information from the following report(s) SBAR, Procedure Summary, MAR, Recent Results and Cardiac Rhythm NSR was reviewed with the receiving nurse. Opportunity for questions and clarification was provided. Assessment completed upon patients arrival to unit and care assumed.

## 2018-09-10 NOTE — PROCEDURES
[address]    Darrion Sifuentes  MR#: [id number]  : 1960  ACCOUNT #: [de-identified]   DATE OF SERVICE: 09/10/2018    CLINICAL INDICATION:  Atrial fibrillation with rapid ventricular response. PROCEDURE:  SHAE-guided cardioversion. DETAILS OF PROCEDURE:  After informed consent, the patient underwent oropharyngeal anesthesia, sedated with Versed and fentanyl. SHAE probe was passed without complications. CONSCIOUS SEDATION:  Start time 1600, end time 1620. MEDICATIONS:  6 mg of Versed and 75 mcg of fentanyl. MONITORING RN:  Richard Mendez. FINDINGS:  SHAE:  SHAE demonstrates normal left ventricular systolic function with no significant valvular heart disease. The left atrium is moderately dilated, but there is no evidence of left atrial, left atrial appendage, right atrial, or right atrial appendage thrombus present. Following completion of the SHAE, the patient had been sedated, 200 joules of synchronized biphasic energy restored sinus tachycardia. CONCLUSION:  Successful SHAE-guided cardioversion. RECOMMENDATIONS:  The patient will be treated with Eliquis 5 mg twice daily and will be started on amiodarone 400 mg twice daily and will be decreased to 200 mg twice daily prior to discharge. Thank you for allowing us to participate in the care of this patient. If there are questions or concerns, please feel free to contact me.       MD DAVID Way / Meryle Deis  D: 09/10/2018 16:48     T: 09/10/2018 17:30  JOB #: [id number]

## 2018-09-10 NOTE — PROGRESS NOTES
Monitor room called- patient with six second pause. Patient asymptomatic. Mikayla Rivers NP notified. Order received to hold Lopressor in AM.

## 2018-09-10 NOTE — PROGRESS NOTES
Hospitalist Progress Note 9/10/2018 Admit Date: 2018  7:44 PM  
NAME: Racheal Farris :  1960 MRN:  [id number] Attending: Maria M Blum MD 
PCP:  Nelly Millan MD 
 
SUBJECTIVE:  
Patient is 23PL with hx htn, pyoderma gangrenosum presented with sepsis and new afib Feeling well this morning. Foot is somewhat sore but pt states the ulcer looked very good to him when dressing changed last night. Some warmth but improved. Review of Systems negative with exception of pertinent positives noted above PHYSICAL EXAM  
 
Visit Vitals  /88 (BP 1 Location: Left arm, BP Patient Position: Sitting)  Pulse 79  Temp 96.8 °F (36 °C)  Resp 22  
 Ht 6' 1\" (1.854 m)  Wt 116.2 kg (256 lb 3.2 oz)  SpO2 97%  BMI 33.8 kg/m2 Temp (24hrs), Av.2 °F (36.2 °C), Min:96.8 °F (36 °C), Max:97.4 °F (36.3 °C) Oxygen Therapy O2 Sat (%): 97 % (09/10/18 0445) Pulse via Oximetry: 79 beats per minute (18 2200) O2 Device: Room air (18 194) Intake/Output Summary (Last 24 hours) at 09/10/18 9796 Last data filed at 09/10/18 0122 Gross per 24 hour Intake                0 ml Output             1580 ml Net            -1580 ml General: No acute distress   
Lungs:  CTA Bilaterally. Heart:  Regular rate and rhythm,  No murmur, rub, or gallop. 2s cap refill Abdomen: Soft, Non distended, Non tender, Positive bowel sounds Extremities: No cyanosis, clubbing or edema Neurologic:  No focal deficits Skin:   Dorsum L foot with 5cm rectangle of granulation tissue with pink base. Warm, 1cm erythema ASSESSMENT Active Hospital Problems Diagnosis Date Noted  Atrial fibrillation with rapid ventricular response ([facility] [address] 2018  Cellulitis of foot 2018  Pyoderma gangrenosum 2018  Hypokalemia 2018  Hypomagnesemia 2018  CKD (chronic kidney disease) 2018  Current chronic use of systemic steroids 2018  Essential hypertension 11/07/2017  Tobacco abuse 09/20/2017  Obesity, unspecified 09/20/2017 Plan: 
 
#Cellulitis - Initially had very good clinical response on vanc/zosyn, then deescalated to clinda with worsening local infectious signs. No cultures obtained while reportedly purulent drainage. WBC trending back up. Cefepime. XR negative for signs of osteo in this chronic wound. Do not anticipate surgical needs. Would appreciate ID recommendations for antibiotic coverage/duration and transition to PO. #Sepsis - resolved - soft tissue source - Lactic acid normalized. - BCx NGTD 24h  
- UCx skin tanvir #Pyoderma Gangrenosum - superinfected as above. Per wound care. Continue home prednisone. Pt has follow-up with primary podiatrist Rupesh King) already scheduled for 9/11 if discharged. #Afib RVR - per cards. Heparin drip, dilt. Attributed to sepsis. Cards considering conversion. #HypoK, HypoMg - monitor and replace #TAYLOR - fluids, follow DVT Prophylaxis: heparin drip as above Signed By: Narendra Hargrove MD   
 September 10, 2018

## 2018-09-11 VITALS
HEIGHT: 73 IN | SYSTOLIC BLOOD PRESSURE: 169 MMHG | WEIGHT: 249.9 LBS | DIASTOLIC BLOOD PRESSURE: 106 MMHG | TEMPERATURE: 98 F | OXYGEN SATURATION: 98 % | HEART RATE: 91 BPM | BODY MASS INDEX: 33.12 KG/M2 | RESPIRATION RATE: 20 BRPM

## 2018-09-11 LAB
BACTERIA SPEC CULT: NORMAL
BACTERIA SPEC CULT: NORMAL
SERVICE CMNT-IMP: NORMAL
SERVICE CMNT-IMP: NORMAL

## 2018-09-11 PROCEDURE — 74011636637 HC RX REV CODE- 636/637: Performed by: INTERNAL MEDICINE

## 2018-09-11 PROCEDURE — 74011250637 HC RX REV CODE- 250/637: Performed by: INTERNAL MEDICINE

## 2018-09-11 PROCEDURE — 77030018836 HC SOL IRR NACL ICUM -A

## 2018-09-11 PROCEDURE — 74011250637 HC RX REV CODE- 250/637: Performed by: PHYSICIAN ASSISTANT

## 2018-09-11 RX ORDER — CEPHALEXIN 500 MG/1
500 CAPSULE ORAL EVERY 6 HOURS
Qty: 12 CAP | Refills: 0 | Status: SHIPPED | OUTPATIENT
Start: 2018-09-11 | End: 2018-09-14

## 2018-09-11 RX ORDER — AMIODARONE HYDROCHLORIDE 400 MG/1
400 TABLET ORAL 2 TIMES DAILY
Qty: 60 TAB | Refills: 0 | Status: SHIPPED | OUTPATIENT
Start: 2018-09-11 | End: 2018-09-25 | Stop reason: ALTCHOICE

## 2018-09-11 RX ORDER — CHLORPHENIRAMINE MALEATE 4 MG
TABLET ORAL 2 TIMES DAILY
Qty: 15 G | Refills: 0 | Status: SHIPPED
Start: 2018-09-11 | End: 2019-03-19 | Stop reason: ALTCHOICE

## 2018-09-11 RX ORDER — METOPROLOL SUCCINATE 50 MG/1
50 TABLET, EXTENDED RELEASE ORAL DAILY
Status: DISCONTINUED | OUTPATIENT
Start: 2018-09-11 | End: 2018-09-11 | Stop reason: HOSPADM

## 2018-09-11 RX ADMIN — PREDNISONE 50 MG: 20 TABLET ORAL at 08:41

## 2018-09-11 RX ADMIN — CEPHALEXIN 500 MG: 500 CAPSULE ORAL at 06:39

## 2018-09-11 RX ADMIN — CLOTRIMAZOLE: 10 CREAM TOPICAL at 08:45

## 2018-09-11 RX ADMIN — LIDOCAINE: 40 CREAM TOPICAL at 08:45

## 2018-09-11 RX ADMIN — AMIODARONE HYDROCHLORIDE 400 MG: 200 TABLET ORAL at 08:40

## 2018-09-11 RX ADMIN — METOPROLOL SUCCINATE 50 MG: 50 TABLET, EXTENDED RELEASE ORAL at 10:37

## 2018-09-11 RX ADMIN — CEPHALEXIN 500 MG: 500 CAPSULE ORAL at 00:48

## 2018-09-11 RX ADMIN — APIXABAN 5 MG: 5 TABLET, FILM COATED ORAL at 08:41

## 2018-09-11 RX ADMIN — HYDROCHLOROTHIAZIDE: 12.5 CAPSULE ORAL at 10:36

## 2018-09-11 RX ADMIN — CEPHALEXIN 500 MG: 500 CAPSULE ORAL at 12:22

## 2018-09-11 RX ADMIN — OXYCODONE AND ACETAMINOPHEN 1 TABLET: 5; 325 TABLET ORAL at 08:41

## 2018-09-11 RX ADMIN — OXYCODONE AND ACETAMINOPHEN 1 TABLET: 5; 325 TABLET ORAL at 00:48

## 2018-09-11 NOTE — PROGRESS NOTES
Problem: Falls - Risk of 
Goal: *Absence of Falls Document Jennifer Fuller Fall Risk and appropriate interventions in the flowsheet. Outcome: Progressing Towards Goal 
Fall Risk Interventions: 
Mobility Interventions: Patient to call before getting OOB Medication Interventions: Patient to call before getting OOB Elimination Interventions: Call light in reach History of Falls Interventions: Evaluate medications/consider consulting pharmacy

## 2018-09-11 NOTE — PROGRESS NOTES
[facility] CARDIOLOGY PROGRESS NOTE 
      
 
9/11/2018 8:08 AM 
 
Admit Date: 9/5/2018 Subjective:  
Remains in SR and stable post SHAE/eCV. 
 
ROS: 
Cardiovascular:  As noted above Objective:  
  
Vitals:  
 09/10/18 1834 09/10/18 2045 09/11/18 0114 09/11/18 8937 BP: 109/64 (!) 138/98 141/86 (!) 153/108 Pulse: 94 95 100 92 Resp: 18 18 18 18 Temp: 98.4 °F (36.9 °C) 97.8 °F (36.6 °C) 98.3 °F (36.8 °C) 97.9 °F (36.6 °C) SpO2: 99% 99% 99% 95% Weight:    113.4 kg (249 lb 14.4 oz) Height:      
 
 
Physical Exam: 
General-No Acute Distress Neck- supple, no JVD 
CV- regular rate and rhythm no MRG Lung- clear bilaterally Abd- soft, nontender, nondistended Ext- no edema bilaterally. Skin- warm and dry Data Review:  
Recent Labs  
   09/10/18 
 0514  09/09/18 
 0424 NA  139  142  
K  3.7  4.2 BUN  23  18 CREA  1.44  1.61* GLU  109*  109* WBC  17.9*  19.7* HGB  11.4*  11.9*  
HCT  34.9*  38.3*  
PLT  337  312 Assessment/Plan:  
 
Principal Problem: 
  Cellulitis of foot (9/5/2018) Per primary team  
 
Active Problems: 
  Obesity, unspecified (9/20/2017) This is chronic Tobacco abuse (9/20/2017) Cessation education Essential hypertension (11/7/2017) This is elevated at time and add losartan/hctz and metoprolol succinate Atrial fibrillation with rapid ventricular response ([address] (9/5/2018) SHAE/eCV and on amiodarone 400mg BID and will reduce on return to office. On Eliquis for [facility] Pyoderma gangrenosum (9/5/2018)   Per primary team 
 
 
 
 
Nathen Forrest MD 
9/11/2018 8:08 AM

## 2018-09-11 NOTE — DISCHARGE SUMMARY
Hospitalist Discharge Summary     Patient ID:  Rick Barajas  [id number]  64 y.o.  1960  Admit date: 9/5/2018  7:44 PM  Discharge date and time: 9/11/2018  Attending: Emilee Fraser MD  PCP:  Edmund Leigh MD  Treatment Team: Attending Provider: Emilee Fraser MD; Consulting Provider: Ugo Smith NP; Care Manager: Chidi Nolasco RN; Utilization Review: Delma Ulloa RN; Utilization Review: Rommel Lopez    Principal Diagnosis Cellulitis of foot   Hospital Problems as of 9/11/2018  Date Reviewed: 1/9/2018          Codes Class Noted - Resolved POA    Atrial fibrillation with rapid ventricular response ([facility] [address] ICD-10-CM: I48.91  ICD-9-CM: 427.31  9/5/2018 - Present Yes        * (Principal)Cellulitis of foot (Chronic) ICD-10-CM: A19.783  ICD-9-CM: 682.7  9/5/2018 - Present Yes        Pyoderma gangrenosum (Chronic) ICD-10-CM: L88  ICD-9-CM: 686.01  9/5/2018 - Present Yes        Hypokalemia ICD-10-CM: E87.6  ICD-9-CM: 276.8  9/5/2018 - Present Yes        Hypomagnesemia ICD-10-CM: B75.54  ICD-9-CM: 275.2  9/5/2018 - Present Yes        CKD (chronic kidney disease) (Chronic) ICD-10-CM: N18.9  ICD-9-CM: 585.9  9/5/2018 - Present Yes        Current chronic use of systemic steroids (Chronic) ICD-10-CM: Z79.52  ICD-9-CM: V58.65  9/5/2018 - Present Yes        Essential hypertension ICD-10-CM: I10  ICD-9-CM: 401.9  11/7/2017 - Present Yes        Obesity, unspecified ICD-10-CM: E66.9  ICD-9-CM: 278.00  9/20/2017 - Present Yes        Tobacco abuse ICD-10-CM: Z72.0  ICD-9-CM: 305.1  9/20/2017 - Present Yes                 HPI: 'Mr. Abran Zuniga is a 58 yo male with PMH of HTN evaluated at PCP office today to due malaise and recent syncope. He was found to have new onset afib with RVR and sent for ED workup. IV cardizem has been started and cardiology aware for a consult but due to lactic acidosis and leukocytosis he is referred for hospitalist admit to investigate an underlying sepsis.  He has had pyoderma gangrenosum of left dorsal foot followed by [facility] wound care and Dr. Braxton Witt of podiatry s/p debridement. There is surrounding erythema but he denies silvestre infection complaints. UA and CXR ordered. Denies fever/ shortness of breath. Takes prednisone 50 mg daily for pyoderma gangrenosum  But has missed several recent doses.'     Hospital Course:  1. Sepsis secondary to L foot cellulitis surrounding pyoderma gangrenosum ulcer- started on vanc/zosyn. Had ID consult and recommendation to change to Keflex to treat through 9/13. Symptoms improved. BP re-elevated and started back on anti-hypertensive. 2. A fib with RVR- felt to be initiated by sepsis. He was started on cardizem with some improvement, but issue recurred. Echo done showing normal LVEF. Taken for SHAE/cardioversion on 9/10 and successful conversion to sinus rhythm. He was started on amiodarone and Eliquis per cardiology recommendations. Significant Diagnostic Studies:       Labs: Results:       Chemistry Recent Labs      09/10/18   0514  09/09/18   0424   GLU  109*  109*   NA  139  142   K  3.7  4.2   CL  104  105   CO2  27  21   BUN  23  18   CREA  1.44  1.61*   CA  8.9  8.7   AGAP  8  16      CBC w/Diff Recent Labs      09/10/18   0514  09/09/18   0424   WBC  17.9*  19.7*   RBC  3.91*  4.17*   HGB  11.4*  11.9*   HCT  34.9*  38.3*   PLT  337  312      Cardiac Enzymes No results for input(s): CPK, CKND1, BRI in the last 72 hours. No lab exists for component: CKRMB, TROIP   Coagulation Recent Labs      09/10/18   0514  09/09/18 2146   APTT  51.2*  133.4*       Lipid Panel Lab Results   Component Value Date/Time    Cholesterol, total 190 09/14/2017 12:08 PM    HDL Cholesterol 39 (L) 09/14/2017 12:08 PM    LDL, calculated 119 (H) 09/14/2017 12:08 PM    VLDL, calculated 32 09/14/2017 12:08 PM    Triglyceride 158 (H) 09/14/2017 12:08 PM      BNP No results for input(s): BNPP in the last 72 hours.    Liver Enzymes No results for input(s): TP, ALB, TBIL, AP, SGOT, GPT in the last 72 hours. No lab exists for component: DBIL   Thyroid Studies Lab Results   Component Value Date/Time    TSH 2.900 2018 07:50 PM          Imaging:    XR FOOT LT MIN 3 V   Final Result   IMPRESSION: Soft tissue swelling without radiographic evidence of osteomyelitis. XR CHEST SNGL V   Final Result   IMPRESSION: Normal chest.              Results for orders placed or performed during the hospital encounter of 18   2D ECHO COMPLETE ADULT (TTE) W OR WO CONTR    Narrative    [address] [facility] [address] [facility] [address] [facility]  [phone]    Transthoracic Echocardiogram  2D, M-mode, Doppler, and Color Doppler    Patient: Eliza Ibarra  MR #: [id number]  : [id number]  Age: 59 years  Gender: Male  Study date: 06-Sep-2018  Account #: [de-identified]  Height: 73 in  Weight: 246.4 lb  BSA: 2.35 mï¾²  Status:Routine  Location: 303  BP: 110/ 81    Allergies: NO KNOWN ALLERGIES    Sonographer:  Brandyn Hanna RD  Group:  [address] Cardiology  Referring Physician:  Indra Lamb. Owen Constantino MD  Reading Physician:  Jacinta Pallas, MD    INDICATIONS: Afib. PROCEDURE: This was a routine study. A transthoracic echocardiogram was  performed. The study included complete 2D imaging, M-mode, complete spectral  Doppler, and color Doppler. Intravenous contrast (Definity) was administered. Image quality was adequate. LEFT VENTRICLE: Size was normal. Systolic function was normal. Ejection  fraction was estimated in the range of 60 % to 65 %. There were no regional  wall motion abnormalities. Wall thickness was normal. The study was not  technically sufficient to allow evaluation of LV diastolic function. RIGHT VENTRICLE: The size was normal. Systolic function was normal.    LEFT ATRIUM: The atrium was mildly dilated. RIGHT ATRIUM: Size was normal.    SYSTEMIC VEINS: IVC: The inferior vena cava was normal in size.     AORTIC VALVE: The valve was trileaflet. There was no evidence for stenosis. There was trivial regurgitation. MITRAL VALVE: Valve structure was normal. There was no evidence for stenosis. There was mild regurgitation. TRICUSPID VALVE: The valve structure was normal. There was no evidence for  stenosis. There was no regurgitation. PULMONIC VALVE: Not well visualized. There was no evidence for stenosis. There  was trivial regurgitation. PERICARDIUM: There was no pericardial effusion. AORTA: The root exhibited normal size. There was moderate dilatation of the  ascending aorta. (4.6 cm)    SUMMARY:    -  Left ventricle: Systolic function was normal. Ejection fraction was  estimated in the range of 60 % to 65 %. There were no regional wall motion  abnormalities. -  Left atrium: The atrium was mildly dilated. -  Aorta, systemic arteries: There was moderate dilatation of the ascending  aorta. (4.6 cm)    SYSTEM MEASUREMENT TABLES    2D mode  AoR Diam (2D): 3.6 cm  LA Dimension (2D): 4.7 cm  Left Atrium Systolic Volume Index; Method of Disks, Biplane; 2D mode;: 34.8  ml/m2  IVS/LVPW (2D): 1.1  IVSd (2D): 1.2 cm  LVIDd (2D): 5.1 cm  LVIDs (2D): 2.8 cm  LVOT Area (2D): 4.2 cm2  LVPWd (2D): 1.1 cm  RVIDd (2D): 3.4 cm    Unspecified Scan Mode  Peak Grad; Mean; Antegrade Flow: 7 mm[Hg]  Vmax; Antegrade Flow: 140 cm/s  LVOT Diam: 2.3 cm    Prepared and signed by    Jamey Gutiérrez MD  Signed 07-Sep-2018 14:04:03       Discharge Exam:  Visit Vitals    BP (!) 169/106    Pulse 91    Temp 98 °F (36.7 °C)    Resp 20    Ht 6' 1\" (1.854 m)    Wt 113.4 kg (249 lb 14.4 oz)    SpO2 98%    BMI 32.97 kg/m2     General appearance: alert, cooperative, no distress, appears stated age, obese  Lungs: clear to auscultation bilaterally  Heart: regular rate and rhythm, S1, S2 normal, no murmur, click, rub or gallop  Abdomen: soft, non-tender.  Bowel sounds normal. No masses,  no organomegaly  Extremities: large L foot ulcer on dorsum with mild surrounding erythema  Neurologic: Grossly normal    Disposition: home  Discharge Condition: stable  Patient Instructions:   Current Discharge Medication List      START taking these medications    Details   amiodarone (PACERONE) 400 mg tablet Take 1 Tab by mouth two (2) times a day. Indications: Cardioversion of Atrial Fibrillation  Qty: 60 Tab, Refills: 0      apixaban (ELIQUIS) 5 mg tablet Take 1 Tab by mouth every twelve (12) hours. Indications: Cerebral Thromboembolism Prevention  Qty: 60 Tab, Refills: 0      cephALEXin (KEFLEX) 500 mg capsule Take 1 Cap by mouth every six (6) hours for 3 days. Indications: Skin and Skin Structure Infection  Qty: 12 Cap, Refills: 0      clotrimazole (LOTRIMIN) 1 % topical cream Apply  to affected area two (2) times a day. Qty: 15 g, Refills: 0         CONTINUE these medications which have NOT CHANGED    Details   sodium hyaluronate (BIONECT) 0.2 % topical cream apply to affected area on lt. foot twice. oxyCODONE-acetaminophen (PERCOCET) 5-325 mg per tablet take 1 tablet by oral route  every 6 hours as needed FOR PAIN      predniSONE (DELTASONE) 50 mg tablet Take 1 Tab by mouth.      metoprolol succinate (TOPROL-XL) 100 mg tablet Take 100 mg by mouth daily. Olmesartan-amLODIPine-HCTZ (TRIBENZOR) 40-10-25 mg tab Take 1 Tab by mouth daily. Qty: 30 Tab, Refills: 11      sildenafil citrate (VIAGRA) 100 mg tablet Take 1 Tab by mouth as needed. Qty: 10 Tab, Refills: 11             Activity: Activity as tolerated  Diet: Cardiac Diet  Wound Care: Per directions of Dr. Anuj Montenegro. Lotrimin cream added to be put around edge of ulcer. Follow-up      Follow-up Appointments   Procedures    FOLLOW UP VISIT Appointment in: Other ([address] [facility]) 1. [address] Cardiology in 2 weeks. 2. Dr. Anuj Montenegro (podiatry)- patient to call to make an appointment. 3. Dr. Dileep Stauffer (PCP) in 1 week for hospital follow up. 1. [address] Cardiology in 2 weeks.   2. Dr. Anuj Montenegro (podiatry)- patient to call to make an appointment. 3. Dr. Jeanne Saunders (PCP) in 1 week for hospital follow up. Standing Status:   Standing     Number of Occurrences:   1     Order Specific Question:   Appointment in     Answer: Other (Specify)   ·   ·   Time spent to discharge patient 31 minutes  Signed:  Alisha Centeno.  Mikey Hernández MD  9/11/2018  1:11 PM

## 2018-09-11 NOTE — PROGRESS NOTES
Bedside and Verbal shift change report given to self (oncoming nurse) by Leia Hanna (offgoing nurse). Report included the following information SBAR, Kardex and Med Rec Status.  \

## 2018-09-11 NOTE — DISCHARGE INSTRUCTIONS
Electrical Cardioversion: What to Expect at Home  Your Recovery    Electrical cardioversion is a treatment for an abnormal heartbeat, such as atrial fibrillation, supraventricular tachycardia, or ventricular tachycardia (VT). It uses a brief electrical shock to reset your heart's rhythm. After cardioversion, you may have redness, like a sunburn, where the patches were. The medicines you got to make you sleepy may make you feel drowsy for the rest of the day. Your doctor may have you take medicines to help the heart beat normally and to prevent blood clots. This care sheet gives you a general idea about how long it will take for you to recover. But each person recovers at a different pace. Follow the steps below to feel better as quickly as possible. How can you care for yourself at home? Medicines    · Be safe with medicines. Take your medicines exactly as prescribed. Call your doctor if you think you are having a problem with your medicine. You may take one or more of the following medicines:  ¨ Rate-control medicines to slow the heart rate. These include beta-blockers, calcium channel blockers, and digoxin. ¨ Rhythm control medicines that help the heart keep a normal rhythm. ¨ Blood thinners, also called anticoagulants, which help prevent blood clots. You will get more details on the specific medicines your doctor prescribes. Be sure you know how to take your medicines safely.     · Do not take any vitamins, over-the-counter medicines, or herbal products without talking to your doctor first.   Exercise    · Start light exercise if your doctor says that it is okay. Even a small amount will help you get stronger, have more energy, and manage your stress. Walking is an easy way to get exercise. Start out by walking a little more than you did in the hospital. Bit by bit, increase the amount you walk.     · When you exercise, watch for signs that your heart is working too hard.  You are pushing too hard if you cannot talk while you are exercising. If you become short of breath or dizzy or have chest pain, sit down and rest right away.     · Check your pulse regularly. Place two fingers on the artery at the palm side of your wrist in line with your thumb. If your heartbeat seems uneven or fast, talk to your doctor. Other instructions    · Ask your doctor when you can drive again.     · Do not smoke. If you need help quitting, talk to your doctor about stop-smoking programs and medicines. These can increase your chances of quitting for good.     · Limit alcohol. Follow-up care is a key part of your treatment and safety. Be sure to make and go to all appointments, and call your doctor if you are having problems. It's also a good idea to know your test results and keep a list of the medicines you take. When should you call for help? Call 911 anytime you think you may need emergency care. For example, call if:    · You passed out (lost consciousness).     · You have chest pain or pressure. This may occur with:  ¨ Sweating. ¨ Shortness of breath. ¨ Nausea or vomiting. ¨ Pain that spreads from the chest to the neck, jaw, or one or both shoulders or arms. ¨ A fast or uneven pulse. After calling 911, the  may tell you to chew 1 adult-strength or 2 to 4 low-dose aspirin. Wait for an ambulance. Do not try to drive yourself.     · You have symptoms of a stroke. These may include:  ¨ Sudden numbness, tingling, weakness, or loss of movement in your face, arm, or leg, especially on sejal side of your body. ¨ Sudden vision changes. ¨ Sudden trouble speaking. ¨ Sudden confusion or trouble understanding simple statements. ¨ Sudden problems with walking or balance.   ¨ A sudden, severe headache that is different from past headaches.    Call your doctor now or seek immediate medical care if:    · You feel dizzy or lightheaded, or you feel like you may faint.     · You have a fast or irregular heartbeat.    Watch closely for any changes in your health, and be sure to contact your doctor if you have any problems. Where can you learn more? Go to http://yaneli-ifrah.info/. Enter A617 in the search box to learn more about \"Electrical Cardioversion: What to Expect at Home. \"  Current as of: December 6, 2017  Content Version: 11.7  © 3714-1239 [employer]. Care instructions adapted under license by [employer] (which disclaims liability or warranty for this information). If you have questions about a medical condition or this instruction, always ask your healthcare professional. [place] [address] any warranty or liability for your use of this information. Learning About Benefits From Quitting Smoking  How does quitting smoking make you healthier? If you're thinking about quitting smoking, you may have a few reasons to be smoke-free. Your health may be one of them. · When you quit smoking, you lower your risks for cancer, lung disease, heart attack, stroke, blood vessel disease, and blindness from macular degeneration. · When you're smoke-free, you get sick less often, and you heal faster. You are less likely to get colds, flu, bronchitis, and pneumonia. · As a nonsmoker, you may find that your mood is better and you are less stressed. When and how will you feel healthier? Quitting has real health benefits that start from day 1 of being smoke-free. And the longer you stay smoke-free, the healthier you get and the better you feel. The first hours  · After just 20 minutes, your blood pressure and heart rate go down. That means there's less stress on your heart and blood vessels. · Within 12 hours, the level of carbon monoxide in your blood drops back to normal. That makes room for more oxygen. With more oxygen in your body, you may notice that you have more energy than when you smoked. After 2 weeks  · Your lungs start to work better.   · Your risk of heart attack starts to drop. After 1 month  · When your lungs are clear, you cough less and breathe deeper, so it's easier to be active. · Your sense of taste and smell return. That means you can enjoy food more than you have since you started smoking. Over the years  · After 1 year, your risk of heart disease is half what it would be if you kept smoking. · After 5 years, your risk of stroke starts to shrink. Within a few years after that, it's about the same as if you'd never smoked. · After 10 years, your risk of dying from lung cancer is cut by about half. And your risk for many other types of cancer is lower too. How would quitting help others in your life? When you quit smoking, you improve the health of everyone who now breathes in your smoke. · Their heart, lung, and cancer risks drop, much like yours. · They are sick less. For babies and small children, living smoke-free means they're less likely to have ear infections, pneumonia, and bronchitis. · If you're a woman who is or will be pregnant someday, quitting smoking means a healthier . · Children who are close to you are less likely to become adult smokers. Where can you learn more? Go to http://yaneli-ifrah.info/. Enter 052 [id number] 72 11 in the search box to learn more about \"Learning About Benefits From Quitting Smoking. \"  Current as of: 2017  Content Version: 11.7  © 9989-8608 [employer]. Care instructions adapted under license by [employer] (which disclaims liability or warranty for this information). If you have questions about a medical condition or this instruction, always ask your healthcare professional. [place] [address] any warranty or liability for your use of this information.

## 2018-09-11 NOTE — PROGRESS NOTES
Bedside and Verbal shift change report given to Patricia Luevano RN (oncoming nurse) by self [facility] ). Report included the following information SBAR, Kardex, MAR and Recent Results.

## 2018-09-19 PROBLEM — I48.91 ATRIAL FIBRILLATION (HCC): Status: ACTIVE | Noted: 2018-09-19

## 2019-03-19 PROBLEM — Z79.52 CURRENT CHRONIC USE OF SYSTEMIC STEROIDS: Chronic | Status: RESOLVED | Noted: 2018-09-05 | Resolved: 2019-03-19

## 2019-03-19 PROBLEM — E87.6 HYPOKALEMIA: Status: RESOLVED | Noted: 2018-09-05 | Resolved: 2019-03-19

## 2019-03-19 PROBLEM — L88 PYODERMA GANGRENOSUM: Chronic | Status: RESOLVED | Noted: 2018-09-05 | Resolved: 2019-03-19

## 2019-03-19 PROBLEM — I48.91 ATRIAL FIBRILLATION WITH RAPID VENTRICULAR RESPONSE (HCC): Status: RESOLVED | Noted: 2018-09-05 | Resolved: 2019-03-19

## 2019-03-19 PROBLEM — E83.42 HYPOMAGNESEMIA: Status: RESOLVED | Noted: 2018-09-05 | Resolved: 2019-03-19

## 2020-07-14 NOTE — PROGRESS NOTES
Spoke with Jessica Boyle, NP pt's PTT is 47 Order 4,000 unit Heparin bolus and will follow protocol Will continue to monitor closely 98

## 2020-11-16 PROBLEM — N18.30 CKD (CHRONIC KIDNEY DISEASE) STAGE 3, GFR 30-59 ML/MIN (HCC): Status: ACTIVE | Noted: 2020-11-16

## 2021-08-03 PROBLEM — I10 ESSENTIAL HYPERTENSION: Status: RESOLVED | Noted: 2017-11-07 | Resolved: 2021-08-03

## 2022-03-18 PROBLEM — Z72.0 TOBACCO ABUSE: Status: ACTIVE | Noted: 2017-09-20

## 2022-03-19 PROBLEM — N18.9 CKD (CHRONIC KIDNEY DISEASE): Status: ACTIVE | Noted: 2018-09-05

## 2022-03-19 PROBLEM — E66.9 OBESITY, UNSPECIFIED: Status: ACTIVE | Noted: 2017-09-20

## 2022-03-19 PROBLEM — L03.119 CELLULITIS OF FOOT: Status: ACTIVE | Noted: 2018-09-05

## 2022-03-19 PROBLEM — I48.91 ATRIAL FIBRILLATION (HCC): Status: ACTIVE | Noted: 2018-09-19

## 2022-03-19 PROBLEM — I10 POORLY-CONTROLLED HYPERTENSION: Status: ACTIVE | Noted: 2017-09-14

## 2022-03-20 PROBLEM — N18.30 CKD (CHRONIC KIDNEY DISEASE) STAGE 3, GFR 30-59 ML/MIN (HCC): Status: ACTIVE | Noted: 2020-11-16

## 2022-03-22 ENCOUNTER — HOSPITAL ENCOUNTER (OUTPATIENT)
Age: 62
Setting detail: OBSERVATION
Discharge: HOME | End: 2022-03-23
Attending: EMERGENCY MEDICINE | Admitting: INTERNAL MEDICINE
Payer: COMMERCIAL

## 2022-03-22 ENCOUNTER — APPOINTMENT (OUTPATIENT)
Dept: GENERAL RADIOLOGY | Age: 62
End: 2022-03-22
Attending: EMERGENCY MEDICINE
Payer: COMMERCIAL

## 2022-03-22 DIAGNOSIS — I48.0 PAROXYSMAL ATRIAL FIBRILLATION WITH RAPID VENTRICULAR RESPONSE (HCC): ICD-10-CM

## 2022-03-22 DIAGNOSIS — I10 POORLY-CONTROLLED HYPERTENSION: ICD-10-CM

## 2022-03-22 DIAGNOSIS — E78.2 MIXED HYPERLIPIDEMIA: ICD-10-CM

## 2022-03-22 DIAGNOSIS — N18.30 STAGE 3 CHRONIC KIDNEY DISEASE, UNSPECIFIED WHETHER STAGE 3A OR 3B CKD (HCC): ICD-10-CM

## 2022-03-22 DIAGNOSIS — R00.2 PALPITATIONS: Primary | ICD-10-CM

## 2022-03-22 DIAGNOSIS — I48.91 ATRIAL FIBRILLATION WITH RVR (HCC): ICD-10-CM

## 2022-03-22 DIAGNOSIS — Z72.0 TOBACCO ABUSE: ICD-10-CM

## 2022-03-22 DIAGNOSIS — I48.91 ATRIAL FIBRILLATION, UNSPECIFIED TYPE (HCC): ICD-10-CM

## 2022-03-22 PROBLEM — E78.5 HLD (HYPERLIPIDEMIA): Status: ACTIVE | Noted: 2022-03-22

## 2022-03-22 LAB
ALBUMIN SERPL-MCNC: 3.4 G/DL (ref 3.2–4.6)
ALBUMIN/GLOB SERPL: 0.9 {RATIO} (ref 1.2–3.5)
ALP SERPL-CCNC: 96 U/L (ref 50–136)
ALT SERPL-CCNC: 29 U/L (ref 12–65)
ANION GAP SERPL CALC-SCNC: 6 MMOL/L (ref 7–16)
AST SERPL-CCNC: 38 U/L (ref 15–37)
BASOPHILS # BLD: 0.1 K/UL (ref 0–0.2)
BASOPHILS NFR BLD: 1 % (ref 0–2)
BILIRUB SERPL-MCNC: 0.7 MG/DL (ref 0.2–1.1)
BUN SERPL-MCNC: 30 MG/DL (ref 8–23)
CALCIUM SERPL-MCNC: 9.9 MG/DL (ref 8.3–10.4)
CALCULATED R AXIS, ECG10: 54 DEGREES
CALCULATED T AXIS, ECG11: -179 DEGREES
CHLORIDE SERPL-SCNC: 105 MMOL/L (ref 98–107)
CO2 SERPL-SCNC: 28 MMOL/L (ref 21–32)
CREAT SERPL-MCNC: 2 MG/DL (ref 0.8–1.5)
DIAGNOSIS, 93000: NORMAL
DIFFERENTIAL METHOD BLD: ABNORMAL
EOSINOPHIL # BLD: 0.3 K/UL (ref 0–0.8)
EOSINOPHIL NFR BLD: 2 % (ref 0.5–7.8)
ERYTHROCYTE [DISTWIDTH] IN BLOOD BY AUTOMATED COUNT: 13.3 % (ref 11.9–14.6)
GLOBULIN SER CALC-MCNC: 3.9 G/DL (ref 2.3–3.5)
GLUCOSE SERPL-MCNC: 130 MG/DL (ref 65–100)
HCT VFR BLD AUTO: 46.7 % (ref 41.1–50.3)
HGB BLD-MCNC: 15.4 G/DL (ref 13.6–17.2)
IMM GRANULOCYTES # BLD AUTO: 0.1 K/UL (ref 0–0.5)
IMM GRANULOCYTES NFR BLD AUTO: 0 % (ref 0–5)
LIPASE SERPL-CCNC: 113 U/L (ref 73–393)
LYMPHOCYTES # BLD: 4.1 K/UL (ref 0.5–4.6)
LYMPHOCYTES NFR BLD: 32 % (ref 13–44)
MAGNESIUM SERPL-MCNC: 2 MG/DL (ref 1.8–2.4)
MCH RBC QN AUTO: 28.7 PG (ref 26.1–32.9)
MCHC RBC AUTO-ENTMCNC: 33 G/DL (ref 31.4–35)
MCV RBC AUTO: 87.1 FL (ref 79.6–97.8)
MONOCYTES # BLD: 1.2 K/UL (ref 0.1–1.3)
MONOCYTES NFR BLD: 9 % (ref 4–12)
NEUTS SEG # BLD: 7 K/UL (ref 1.7–8.2)
NEUTS SEG NFR BLD: 55 % (ref 43–78)
NRBC # BLD: 0 K/UL (ref 0–0.2)
PLATELET # BLD AUTO: 406 K/UL (ref 150–450)
PMV BLD AUTO: 9 FL (ref 9.4–12.3)
POTASSIUM SERPL-SCNC: 4.2 MMOL/L (ref 3.5–5.1)
PROT SERPL-MCNC: 7.3 G/DL (ref 6.3–8.2)
Q-T INTERVAL, ECG07: 346 MS
QRS DURATION, ECG06: 112 MS
QTC CALCULATION (BEZET), ECG08: 505 MS
RBC # BLD AUTO: 5.36 M/UL (ref 4.23–5.6)
SODIUM SERPL-SCNC: 139 MMOL/L (ref 136–145)
TROPONIN-HIGH SENSITIVITY: 22.6 PG/ML (ref 0–14)
TROPONIN-HIGH SENSITIVITY: 23.4 PG/ML (ref 0–14)
TSH SERPL DL<=0.005 MIU/L-ACNC: 1.93 UIU/ML (ref 0.36–3.74)
VENTRICULAR RATE, ECG03: 128 BPM
WBC # BLD AUTO: 12.7 K/UL (ref 4.3–11.1)

## 2022-03-22 PROCEDURE — 85025 COMPLETE CBC W/AUTO DIFF WBC: CPT

## 2022-03-22 PROCEDURE — 93005 ELECTROCARDIOGRAM TRACING: CPT | Performed by: EMERGENCY MEDICINE

## 2022-03-22 PROCEDURE — 74011000258 HC RX REV CODE- 258: Performed by: EMERGENCY MEDICINE

## 2022-03-22 PROCEDURE — 74011250636 HC RX REV CODE- 250/636: Performed by: EMERGENCY MEDICINE

## 2022-03-22 PROCEDURE — 80053 COMPREHEN METABOLIC PANEL: CPT

## 2022-03-22 PROCEDURE — 99220 PR INITIAL OBSERVATION CARE/DAY 70 MINUTES: CPT | Performed by: INTERNAL MEDICINE

## 2022-03-22 PROCEDURE — 84443 ASSAY THYROID STIM HORMONE: CPT

## 2022-03-22 PROCEDURE — 74011000250 HC RX REV CODE- 250: Performed by: EMERGENCY MEDICINE

## 2022-03-22 PROCEDURE — 71046 X-RAY EXAM CHEST 2 VIEWS: CPT

## 2022-03-22 PROCEDURE — 74011000258 HC RX REV CODE- 258: Performed by: NURSE PRACTITIONER

## 2022-03-22 PROCEDURE — 84484 ASSAY OF TROPONIN QUANT: CPT

## 2022-03-22 PROCEDURE — 74011250636 HC RX REV CODE- 250/636: Performed by: NURSE PRACTITIONER

## 2022-03-22 PROCEDURE — 96374 THER/PROPH/DIAG INJ IV PUSH: CPT

## 2022-03-22 PROCEDURE — 94762 N-INVAS EAR/PLS OXIMTRY CONT: CPT

## 2022-03-22 PROCEDURE — 83690 ASSAY OF LIPASE: CPT

## 2022-03-22 PROCEDURE — G0378 HOSPITAL OBSERVATION PER HR: HCPCS

## 2022-03-22 PROCEDURE — 96376 TX/PRO/DX INJ SAME DRUG ADON: CPT

## 2022-03-22 PROCEDURE — 74011000250 HC RX REV CODE- 250: Performed by: NURSE PRACTITIONER

## 2022-03-22 PROCEDURE — 36415 COLL VENOUS BLD VENIPUNCTURE: CPT

## 2022-03-22 PROCEDURE — 99285 EMERGENCY DEPT VISIT HI MDM: CPT

## 2022-03-22 PROCEDURE — 83735 ASSAY OF MAGNESIUM: CPT

## 2022-03-22 PROCEDURE — 74011250637 HC RX REV CODE- 250/637: Performed by: NURSE PRACTITIONER

## 2022-03-22 RX ORDER — ATORVASTATIN CALCIUM 20 MG/1
20 TABLET, FILM COATED ORAL DAILY
Status: DISCONTINUED | OUTPATIENT
Start: 2022-03-23 | End: 2022-03-23 | Stop reason: HOSPADM

## 2022-03-22 RX ORDER — SODIUM CHLORIDE 0.9 % (FLUSH) 0.9 %
5-10 SYRINGE (ML) INJECTION EVERY 8 HOURS
Status: DISCONTINUED | OUTPATIENT
Start: 2022-03-22 | End: 2022-03-22

## 2022-03-22 RX ORDER — SODIUM CHLORIDE 0.9 % (FLUSH) 0.9 %
5-40 SYRINGE (ML) INJECTION EVERY 8 HOURS
Status: DISCONTINUED | OUTPATIENT
Start: 2022-03-22 | End: 2022-03-23 | Stop reason: HOSPADM

## 2022-03-22 RX ORDER — HYDROCODONE BITARTRATE AND ACETAMINOPHEN 5; 325 MG/1; MG/1
1 TABLET ORAL
Status: DISCONTINUED | OUTPATIENT
Start: 2022-03-22 | End: 2022-03-23 | Stop reason: HOSPADM

## 2022-03-22 RX ORDER — ACETAMINOPHEN 325 MG/1
650 TABLET ORAL
Status: DISCONTINUED | OUTPATIENT
Start: 2022-03-22 | End: 2022-03-23 | Stop reason: HOSPADM

## 2022-03-22 RX ORDER — MORPHINE SULFATE 2 MG/ML
2 INJECTION, SOLUTION INTRAMUSCULAR; INTRAVENOUS
Status: DISCONTINUED | OUTPATIENT
Start: 2022-03-22 | End: 2022-03-23 | Stop reason: HOSPADM

## 2022-03-22 RX ORDER — NITROGLYCERIN 0.4 MG/1
0.4 TABLET SUBLINGUAL
Status: DISCONTINUED | OUTPATIENT
Start: 2022-03-22 | End: 2022-03-23 | Stop reason: HOSPADM

## 2022-03-22 RX ORDER — SODIUM CHLORIDE 0.9 % (FLUSH) 0.9 %
5-10 SYRINGE (ML) INJECTION AS NEEDED
Status: DISCONTINUED | OUTPATIENT
Start: 2022-03-22 | End: 2022-03-22

## 2022-03-22 RX ORDER — DILTIAZEM HYDROCHLORIDE 5 MG/ML
20 INJECTION INTRAVENOUS
Status: COMPLETED | OUTPATIENT
Start: 2022-03-22 | End: 2022-03-22

## 2022-03-22 RX ORDER — METOPROLOL SUCCINATE 100 MG/1
100 TABLET, EXTENDED RELEASE ORAL DAILY
Status: DISCONTINUED | OUTPATIENT
Start: 2022-03-23 | End: 2022-03-23 | Stop reason: HOSPADM

## 2022-03-22 RX ADMIN — SODIUM CHLORIDE, PRESERVATIVE FREE 10 ML: 5 INJECTION INTRAVENOUS at 21:55

## 2022-03-22 RX ADMIN — SODIUM CHLORIDE 2.5 MG/HR: 900 INJECTION, SOLUTION INTRAVENOUS at 18:14

## 2022-03-22 RX ADMIN — SODIUM CHLORIDE, PRESERVATIVE FREE 5 ML: 5 INJECTION INTRAVENOUS at 18:00

## 2022-03-22 RX ADMIN — DILTIAZEM HYDROCHLORIDE 20 MG: 5 INJECTION INTRAVENOUS at 14:25

## 2022-03-22 RX ADMIN — SODIUM CHLORIDE, PRESERVATIVE FREE 5 ML: 5 INJECTION INTRAVENOUS at 14:00

## 2022-03-22 RX ADMIN — SODIUM CHLORIDE 2.5 MG/HR: 900 INJECTION, SOLUTION INTRAVENOUS at 14:25

## 2022-03-22 RX ADMIN — RIVAROXABAN 20 MG: 20 TABLET, FILM COATED ORAL at 18:13

## 2022-03-22 NOTE — ED TRIAGE NOTES
Pt arrives ambulatory to triage. Pt states he feels like he is in a fib. Pt states has been in it for about 10 hours. Takes eliquis. Pt states hx HR was as high as 140 at home. Also reports some chest tightness that started around the same time. NAD. Masked.

## 2022-03-22 NOTE — ED PROVIDER NOTES
58-year-old male woke up at 3 AM to get some ice cream.  Noticed some palpitations and chest discomfort. Felt similar to previous episode of atrial fibrillation about 4 years ago. Slight shortness of breath and weakness. No syncope. No particular radiation nausea vomiting or diaphoresis. History of hypertension takes medications. Review of old records reveals the patient had atrial fibrillation fast ventricular response in 2018. He was cardioverted. At that time he started Eliquis and amiodarone. He is not on amiodarone anymore as best he can remember. Still takes medicines for blood pressure. The history is provided by the patient. Rapid Heart Rate  This is a new problem. The current episode started 6 to 12 hours ago. The problem occurs constantly. The problem has not changed since onset. Associated symptoms include chest pain. Pertinent negatives include no abdominal pain, no headaches and no shortness of breath. Nothing relieves the symptoms. He has tried nothing for the symptoms. Past Medical History:   Diagnosis Date    Atrial fibrillation with rapid ventricular response ([facility] [address] 9/5/2018    Hypercholesterolemia     Hypertension 2007    Kidney disease     Pyoderma gangrenosum 9/5/2018       No past surgical history on file.       Family History:   Problem Relation Age of Onset    OSTEOARTHRITIS Mother     Asthma Mother     Cancer Mother     Hypertension Mother     Stroke Father     OSTEOARTHRITIS Maternal Grandmother     OSTEOARTHRITIS Maternal Grandfather     Hypertension Maternal Grandfather     Stroke Maternal Grandfather     OSTEOARTHRITIS Paternal Grandmother     Diabetes Paternal Grandmother     OSTEOARTHRITIS Paternal Grandfather     Heart Disease Paternal Grandfather     Stroke Paternal Grandfather        Social History     Socioeconomic History    Marital status:      Spouse name: Not on file    Number of children: Not on file    Years of education: Not on file    Highest education level: Not on file   Occupational History    Not on file   Tobacco Use    Smoking status: Current Every Day Smoker     Types: Cigarettes    Smokeless tobacco: Never Used   Vaping Use    Vaping Use: Never used   Substance and Sexual Activity    Alcohol use: Yes     Alcohol/week: 1.0 standard drink     Types: 1 Glasses of wine per week    Drug use: No    Sexual activity: Yes     Partners: Female     Birth control/protection: None   Other Topics Concern    Not on file   Social History Narrative    Not on file     Social Determinants of Health     Financial Resource Strain:     Difficulty of Paying Living Expenses: Not on file   Food Insecurity:     Worried About Running Out of Food in the Last Year: Not on file    David of Food in the Last Year: Not on file   Transportation Needs:     Lack of Transportation (Medical): Not on file    Lack of Transportation (Non-Medical): Not on file   Physical Activity:     Days of Exercise per Week: Not on file    Minutes of Exercise per Session: Not on file   Stress:     Feeling of Stress : Not on file   Social Connections:     Frequency of Communication with Friends and Family: Not on file    Frequency of Social Gatherings with Friends and Family: Not on file    Attends Temple Services: Not on file    Active Member of [address] [place] [address] [employer] or Organizations: Not on file    Attends Club or Organization Meetings: Not on file    Marital Status: Not on file   Intimate Partner Violence:     Fear of Current or Ex-Partner: Not on file    Emotionally Abused: Not on file    Physically Abused: Not on file    Sexually Abused: Not on file   Housing Stability:     Unable to Pay for Housing in the Last Year: Not on file    Number of [place] in the Last Year: Not on file    Unstable Housing in the Last Year: Not on file         ALLERGIES: Patient has no known allergies. Review of Systems   Respiratory: Negative for shortness of breath. Cardiovascular: Positive for chest pain and palpitations. Negative for leg swelling. Gastrointestinal: Negative for abdominal pain and vomiting. Skin: Negative for color change and rash. Neurological: Negative for headaches. All other systems reviewed and are negative. Vitals:    03/22/22 1314 03/22/22 1409   BP:  110/76   Pulse: (!) 136 (!) 116   Resp: 18 19   Temp: 98.5 °F (36.9 °C)    SpO2: 97% 95%   Weight: 115.7 kg (255 lb)    Height: 6' 1\" (1.854 m)             Physical Exam  Vitals and nursing note reviewed. Constitutional:       General: He is not in acute distress. Appearance: He is well-developed. HENT:      Head: Normocephalic and atraumatic. Right Ear: External ear normal.      Left Ear: External ear normal.      Mouth/Throat:      Pharynx: No oropharyngeal exudate. Eyes:      Conjunctiva/sclera: Conjunctivae normal.      Pupils: Pupils are equal, round, and reactive to light. Cardiovascular:      Rate and Rhythm: Tachycardia present. Rhythm irregular. Heart sounds: No murmur heard. Pulmonary:      Effort: No respiratory distress. Breath sounds: Normal breath sounds. Abdominal:      General: Bowel sounds are normal.      Palpations: Abdomen is soft. There is no mass. Tenderness: There is no abdominal tenderness. There is no guarding or rebound. Hernia: No hernia is present. Musculoskeletal:         General: No swelling or tenderness. Skin:     General: Skin is warm and dry. Neurological:      Mental Status: He is alert and oriented to person, place, and time. Gait: Gait normal.      Comments: Nl speech   Psychiatric:         Speech: Speech normal.          MDM  Number of Diagnoses or Management Options  Diagnosis management comments: Monitor shows apparent atrial fibrillation with fast ventricular response up into the 130s. Cardizem bolus and drip. Screening lab work. Discussed with cardiology.        Amount and/or Complexity of Data Reviewed  Clinical lab tests: ordered and reviewed  Tests in the radiology section of CPT®: ordered and reviewed  Tests in the medicine section of CPT®: ordered and reviewed  Review and summarize past medical records: yes  Discuss the patient with other providers: yes  Independent visualization of images, tracings, or specimens: yes (My interpretation EKG shows sinus atrial fibrillation rapid ventricular response 128. Nonspecific intraventricular conduction delay. Nonspecific ST changes. No ventricular ectopy.   Normal QT interval.  Last EKG was normal sinus rhythm but this was a number of years ago.)    Risk of Complications, Morbidity, and/or Mortality  Presenting problems: moderate  Diagnostic procedures: minimal  Management options: low    Patient Progress  Patient progress: stable         Procedures      Results Include:    Recent Results (from the past 24 hour(s))   EKG, 12 LEAD, INITIAL    Collection Time: 03/22/22  1:16 PM   Result Value Ref Range    Ventricular Rate 128 BPM    QRS Duration 112 ms    Q-T Interval 346 ms    QTC Calculation (Bezet) 505 ms    Calculated R Axis 54 degrees    Calculated T Axis -179 degrees    Diagnosis       Atrial fibrillation with rapid ventricular response with premature   ventricular or aberrantly conducted complexes  ST & T wave abnormality, consider inferolateral ischemia  Abnormal ECG  When compared with ECG of 10-SEP-2018 16:37,  Atrial fibrillation has replaced Sinus rhythm  Non-specific change in ST segment in Inferior leads  ST now depressed in Lateral leads  T wave inversion now evident in Inferior leads  T wave inversion now evident in Lateral leads     CBC WITH AUTOMATED DIFF    Collection Time: 03/22/22  1:21 PM   Result Value Ref Range    WBC 12.7 (H) 4.3 - 11.1 K/uL    RBC 5.36 4.23 - 5.6 M/uL    HGB 15.4 13.6 - 17.2 g/dL    HCT 46.7 41.1 - 50.3 %    MCV 87.1 79.6 - 97.8 FL    MCH 28.7 26.1 - 32.9 PG    MCHC 33.0 31.4 - 35.0 g/dL    RDW 13.3 11.9 - 14.6 % PLATELET 976 675 - 083 K/uL    MPV 9.0 (L) 9.4 - 12.3 FL    ABSOLUTE NRBC 0.00 0.0 - 0.2 K/uL    DF AUTOMATED      NEUTROPHILS 55 43 - 78 %    LYMPHOCYTES 32 13 - 44 %    MONOCYTES 9 4.0 - 12.0 %    EOSINOPHILS 2 0.5 - 7.8 %    BASOPHILS 1 0.0 - 2.0 %    IMMATURE GRANULOCYTES 0 0.0 - 5.0 %    ABS. NEUTROPHILS 7.0 1.7 - 8.2 K/UL    ABS. LYMPHOCYTES 4.1 0.5 - 4.6 K/UL    ABS. MONOCYTES 1.2 0.1 - 1.3 K/UL    ABS. EOSINOPHILS 0.3 0.0 - 0.8 K/UL    ABS. BASOPHILS 0.1 0.0 - 0.2 K/UL    ABS. IMM. GRANS. 0.1 0.0 - 0.5 K/UL     XR CHEST PA LAT    Result Date: 3/22/2022  TWO-VIEW CHEST: CLINICAL HISTORY: CHEST pain with a history of atrial fibrillation on anticoagulation therapy. COMPARISON:  September 5, 2018. FINDINGS: PA and lateral chest images demonstrate no acute pneumonic infiltrate or significant pleural fluid collection. The heart size is within normal limits without evidence of congestive heart failure or pneumothorax. The bony thorax appears intact on these views. There are overlying radiopaque support devices. NO ACUTE CARDIOPULMONARY DISEASE IDENTIFIED. Controlling rate with Cardizem. Will discuss with cardiology. Patient's creatinine slightly elevated over baseline. Appears to be a chronic issue looking back at primary care records.

## 2022-03-22 NOTE — ROUTINE PROCESS
TRANSFER - IN REPORT:    Verbal report received from [address] on Hanna Sanchez  being received from ER for routine progression of care      Report consisted of patients Situation, Background, Assessment and   Recommendations(SBAR). Information from the following report(s) SBAR, Kardex, ED Summary, Intake/Output and MAR was reviewed with the receiving nurse. Opportunity for questions and clarification was provided. Assessment completed upon patients arrival to unit and care assumed. Dual skin assessment performed with Michelle Drew RN. No pressure ulcers noted. Pt was oriented to new room, placed on monitor, and assessed. All questions answered at this time.

## 2022-03-22 NOTE — H&P
[address] Cardiology History & Physical      Date of  Admission: 3/22/2022  1:56 PM     Primary Care Physician:  Dr. Prateek Nair  Primary Cardiologist:  Dr. Flaquito Vazquez (last visit 2018)  Admitting Physician: Dr. Ceasar Bowers    CC:  Atrial fibrillation with RVR     HPI:  Sherryle Peaches is a 59 y.o. male with [address] of pAF, HTN, CKD III, and HLD, who presented to the ED with c/o palpitations. Patient notes feeling well, but got up this AM around 3 am to eat ice cream and noted palpitations and some chest tightness. When the palpitations persisted he came to the ED for evaluation. EKG showed A fib with rVR @ 128. Labs show WBC 12.7, H&H 15.4/46.7, plt 406, , K 4.2, BUN 30, creatinine 2.00, mag 2.0.  TSH pending. He was treated with IVP cardizem 20 mg and drip started. Chest tightness resolved with improved HR. Patient is prescribed Eliquis 5 mg BID and toprol. He reports taking Eliquis only once a day due to cost.   On exam, he is CP free, remains in afib in 90s. Past Medical History:   Diagnosis Date    Atrial fibrillation with rapid ventricular response ([address] 9/5/2018    Hypercholesterolemia     Hypertension 2007    Kidney disease     Pyoderma gangrenosum 9/5/2018      No past surgical history on file. No Known Allergies   Social History     Socioeconomic History    Marital status:      Spouse name: Not on file    Number of children: Not on file    Years of education: Not on file    Highest education level: Not on file   Occupational History    Not on file   Tobacco Use    Smoking status: Current Every Day Smoker     Types: Cigarettes    Smokeless tobacco: Never Used   Vaping Use    Vaping Use: Never used   Substance and Sexual Activity    Alcohol use:  Yes     Alcohol/week: 1.0 standard drink     Types: 1 Glasses of wine per week    Drug use: No    Sexual activity: Yes     Partners: Female     Birth control/protection: None   Other Topics Concern    Not on file   Social History Narrative Not on file     Social Determinants of Health     Financial Resource Strain:     Difficulty of Paying Living Expenses: Not on file   Food Insecurity:     Worried About [address] in the Last Year: Not on file    Ran Out of Food in the Last Year: Not on file   Transportation Needs:     Lack of Transportation (Medical): Not on file    Lack of Transportation (Non-Medical):  Not on file   Physical Activity:     Days of Exercise per Week: Not on file    Minutes of Exercise per Session: Not on file   Stress:     Feeling of Stress : Not on file   Social Connections:     Frequency of Communication with Friends and Family: Not on file    Frequency of Social Gatherings with Friends and Family: Not on file    Attends Congregation Services: Not on file    Active Member of [address] [place] [address] or Organizations: Not on file    Attends Club or Organization Meetings: Not on file    Marital Status: Not on file   Intimate Partner Violence:     Fear of Current or Ex-Partner: Not on file    Emotionally Abused: Not on file    Physically Abused: Not on file    Sexually Abused: Not on file   Housing Stability:     Unable to Pay for Housing in the Last Year: Not on file    Number of [place] in the Last Year: Not on file    Unstable Housing in the Last Year: Not on file     Family History   Problem Relation Age of Onset    OSTEOARTHRITIS Mother     Asthma Mother     Cancer Mother     Hypertension Mother     Stroke Father     OSTEOARTHRITIS Maternal Grandmother     OSTEOARTHRITIS Maternal Grandfather     Hypertension Maternal Grandfather     Stroke Maternal Grandfather     OSTEOARTHRITIS Paternal Grandmother     Diabetes Paternal Grandmother     OSTEOARTHRITIS Paternal Grandfather     Heart Disease Paternal Grandfather     Stroke Paternal Grandfather         Current Facility-Administered Medications   Medication Dose Route Frequency    sodium chloride (NS) flush 5-10 mL  5-10 mL IntraVENous Q8H    sodium chloride (NS) flush 5-10 mL  5-10 mL IntraVENous PRN     Current Outpatient Medications   Medication Sig    olmesartan-hydroCHLOROthiazide (BENICAR HCT) 40-25 mg per tablet TAKE 1 TABLET BY MOUTH EVERY DAY    amLODIPine (NORVASC) 10 mg tablet TAKE 1 TABLET BY MOUTH EVERY DAY    Eliquis 5 mg tablet TAKE 1 TABLET BY MOUTH TWICE A DAY    metoprolol succinate (TOPROL-XL) 100 mg tablet TAKE 1 TABLET BY MOUTH EVERY DAY    ergocalciferol (ERGOCALCIFEROL) 1,250 mcg (50,000 unit) capsule TAKE 1 CAPSULE BY MOUTH ONE TIME PER WEEK    clotrimazole-betamethasone (Lotrisone) topical cream Applied to affected area twice daily 45 gm tube    triamcinolone acetonide (KENALOG) 0.1 % ointment Apply  to affected area two (2) times a day. use thin layer    sildenafil citrate (VIAGRA) 100 mg tablet TAKE 1 TAB BY MOUTH AS NEEDED. Review of Systems    Review of Systems   Constitutional: Negative. HENT: Negative. Eyes: Negative. Cardiovascular: Positive for irregular heartbeat and palpitations. Respiratory: Negative. Endocrine: Negative. Hematologic/Lymphatic: Negative. Skin: Negative. Musculoskeletal: Negative. Gastrointestinal: Negative. Genitourinary: Negative. Neurological: Negative. Psychiatric/Behavioral: Negative. Allergic/Immunologic: Negative. Subjective:     Visit Vitals  /76   Pulse (!) 116   Temp 98.5 °F (36.9 °C)   Resp 19   Ht 6' 1\" (1.854 m)   Wt 115.7 kg (255 lb)   SpO2 95%   BMI 33.64 kg/m²     Physical Exam  Eyes:      Pupils: Pupils are equal, round, and reactive to light. Cardiovascular:      Rate and Rhythm: Normal rate. Rhythm irregular. Pulmonary:      Effort: Pulmonary effort is normal.      Breath sounds: Normal breath sounds. Abdominal:      General: Bowel sounds are normal.   Musculoskeletal:         General: Normal range of motion. Skin:     General: Skin is warm and dry. Neurological:      General: No focal deficit present.       Mental Status: He is alert and oriented to person, place, and time. Psychiatric:         Mood and Affect: Mood normal.         Behavior: Behavior normal.         Thought Content: Thought content normal.         Judgment: Judgment normal.         Cardiographics  Telemetry: AFIB  ECG: atrial fibrillation, rate 128  Echocardiogram: pending    Labs:   Recent Results (from the past 24 hour(s))   EKG, 12 LEAD, INITIAL    Collection Time: 03/22/22  1:16 PM   Result Value Ref Range    Ventricular Rate 128 BPM    QRS Duration 112 ms    Q-T Interval 346 ms    QTC Calculation (Bezet) 505 ms    Calculated R Axis 54 degrees    Calculated T Axis -179 degrees    Diagnosis       Atrial fibrillation with rapid ventricular response with premature   ventricular or aberrantly conducted complexes  Non-specific ST-t wave changes  Abnormal ECG  When compared with ECG of 10-SEP-2018 16:37,  Atrial fibrillation has replaced Sinus rhythm  Non-specific change in ST segment in Inferior leads  ST now depressed in Lateral leads  T wave inversion now evident in Inferior leads  T wave inversion now evident in Lateral leads  Confirmed by [facility]  MD ()GRETA ([id number]) on 3/22/2022 3:01:15 PM     TROPONIN-HIGH SENSITIVITY    Collection Time: 03/22/22  1:21 PM   Result Value Ref Range    Troponin-High Sensitivity 22.6 (H) 0 - 14 pg/mL   CBC WITH AUTOMATED DIFF    Collection Time: 03/22/22  1:21 PM   Result Value Ref Range    WBC 12.7 (H) 4.3 - 11.1 K/uL    RBC 5.36 4.23 - 5.6 M/uL    HGB 15.4 13.6 - 17.2 g/dL    HCT 46.7 41.1 - 50.3 %    MCV 87.1 79.6 - 97.8 FL    MCH 28.7 26.1 - 32.9 PG    MCHC 33.0 31.4 - 35.0 g/dL    RDW 13.3 11.9 - 14.6 %    PLATELET 451 657 - 675 K/uL    MPV 9.0 (L) 9.4 - 12.3 FL    ABSOLUTE NRBC 0.00 0.0 - 0.2 K/uL    DF AUTOMATED      NEUTROPHILS 55 43 - 78 %    LYMPHOCYTES 32 13 - 44 %    MONOCYTES 9 4.0 - 12.0 %    EOSINOPHILS 2 0.5 - 7.8 %    BASOPHILS 1 0.0 - 2.0 %    IMMATURE GRANULOCYTES 0 0.0 - 5.0 %    ABS. NEUTROPHILS 7.0 1.7 - 8.2 K/UL    ABS.  LYMPHOCYTES 4.1 0.5 - 4.6 K/UL    ABS. MONOCYTES 1.2 0.1 - 1.3 K/UL    ABS. EOSINOPHILS 0.3 0.0 - 0.8 K/UL    ABS. BASOPHILS 0.1 0.0 - 0.2 K/UL    ABS. IMM. GRANS. 0.1 0.0 - 0.5 K/UL   METABOLIC PANEL, COMPREHENSIVE    Collection Time: 03/22/22  1:21 PM   Result Value Ref Range    Sodium 139 136 - 145 mmol/L    Potassium 4.2 3.5 - 5.1 mmol/L    Chloride 105 98 - 107 mmol/L    CO2 28 21 - 32 mmol/L    Anion gap 6 (L) 7 - 16 mmol/L    Glucose 130 (H) 65 - 100 mg/dL    BUN 30 (H) 8 - 23 MG/DL    Creatinine 2.00 (H) 0.8 - 1.5 MG/DL    GFR est AA 44 (L) >60 ml/min/1.73m2    GFR est non-AA 36 (L) >60 ml/min/1.73m2    Calcium 9.9 8.3 - 10.4 MG/DL    Bilirubin, total 0.7 0.2 - 1.1 MG/DL    ALT (SGPT) 29 12 - 65 U/L    AST (SGOT) 38 (H) 15 - 37 U/L    Alk. phosphatase 96 50 - 136 U/L    Protein, total 7.3 6.3 - 8.2 g/dL    Albumin 3.4 3.2 - 4.6 g/dL    Globulin 3.9 (H) 2.3 - 3.5 g/dL    A-G Ratio 0.9 (L) 1.2 - 3.5     LIPASE    Collection Time: 03/22/22  1:21 PM   Result Value Ref Range    Lipase 113 73 - 393 U/L   MAGNESIUM    Collection Time: 03/22/22  1:21 PM   Result Value Ref Range    Magnesium 2.0 1.8 - 2.4 mg/dL       Patient has been seen and examined by Dr. Bobbetta Ahumada and he agrees with the following assessment and plan:     Assessment/Plan:       Principal Problem:    Atrial fibrillation with RVR   -- admit and continue cardizem drip  -- stop eliquis as patient is having difficulty affording and would like daily med, will start Xarelto 20 mg daily. -- NPO after midnight for potential SHAE/eCV if doesn't convert overnight  -- check ZOE     Active Problems:    Hypertension   -- continue toprol XL, hold Benicar with creatinine of 2.00  -- hold Norvasc with cardizem drip infusing       Obesity, unspecified  -- weight loss needed         CKD (chronic kidney disease) stage 3, GFR 30-59 ml/min   --  Gentle hydration overnight, repeat labs in the AM  Estimated Creatinine Clearance: 50.4 mL/min (A) (based on SCr of 2 mg/dL (H)). HLD (hyperlipidemia)   -- continue statin      Selam Casanova NP  3/22/2022 3:06 PM      ATTENDING ADDENDUM:    Patient seen and examined by me. I saw the patient and performed the history and physical myself. I agree with above note by physician extender. Key findings are: He has a history of atrial fibrillation in the past, but is only taking Eliquis once daily due to cost issues. He had acute onset tachypalpitations with associated shortness of breath and weakness at 3 AM this morning. He worked hard yesterday moving heavy trash cans with a good deal of sweating but no angina, heart failure, palpitations, or presyncope during the exertion. Other than a irregularly irregular rhythm his exam is normal today. He is a heavyset gentleman with a thick neck and his son reports severe snoring. He does admit to chronic fatigue. He most likely has sleep apnea but has never been tested. We will check overnight oximetry. We will continue rate control with Cardizem, convert over to once daily Xarelto and enlist the assistance of  to help with affording full dose anticoagulation long-term. He will be maintained n.p.o. after midnight for potential HSAE and cardioversion if he does not spontaneously convert overnight. Risks and benefits of the procedure discussed, he wishes to proceed. CV-irregularly irregular without murmur no JVD at 45 degrees, no S3  Lungs- Clear bilaterally, decreased bibasilar but no crackles or wheezing  Abd- soft, nontender, nondistended  Ext- no edema    Assessment and plan:    Paroxysmal atrial fibrillation-with RVR since 3 AM.  Now rate controlled but still in A. fib on Cardizem drip. Start Xarelto. Creatinine clearance is 50 today. Reassess creatinine tomorrow after overnight hydration. Avoid nephrotoxins for now. SHAE and cardioversion tomorrow if does not spontaneously convert overnight.     Obesity-outpatient cardiac rehab and nutrition counseling, overnight oximetry    Chronic fatigue and snoring-overnight oximetry, outpatient sleep evaluation    Acute on chronic kidney disease-hydrate overnight, no ACE or ARB, no diuretics, recheck creatinine in the morning    Hypertension-controlled at the present time. Hold Benicar given acute rise in creatinine. Reassess in the morning. Titrate meds as needed      JERMAINE Ni MD  [facility] Cardiology  Pager [phone]

## 2022-03-23 ENCOUNTER — HOSPITAL ENCOUNTER (OUTPATIENT)
Dept: CARDIAC CATH/INVASIVE PROCEDURES | Age: 62
Setting detail: OBSERVATION
Discharge: HOME | End: 2022-03-23
Attending: INTERNAL MEDICINE
Payer: COMMERCIAL

## 2022-03-23 ENCOUNTER — APPOINTMENT (OUTPATIENT)
Dept: NON INVASIVE DIAGNOSTICS | Age: 62
End: 2022-03-23
Attending: NURSE PRACTITIONER
Payer: COMMERCIAL

## 2022-03-23 ENCOUNTER — APPOINTMENT (OUTPATIENT)
Dept: CARDIAC CATH/INVASIVE PROCEDURES | Age: 62
End: 2022-03-23
Attending: INTERNAL MEDICINE
Payer: COMMERCIAL

## 2022-03-23 VITALS
HEIGHT: 73 IN | SYSTOLIC BLOOD PRESSURE: 103 MMHG | DIASTOLIC BLOOD PRESSURE: 62 MMHG | WEIGHT: 251 LBS | OXYGEN SATURATION: 100 % | TEMPERATURE: 97.4 F | BODY MASS INDEX: 33.27 KG/M2 | RESPIRATION RATE: 19 BRPM | HEART RATE: 87 BPM

## 2022-03-23 LAB
ANION GAP SERPL CALC-SCNC: 3 MMOL/L (ref 7–16)
ATRIAL RATE: 61 BPM
BUN SERPL-MCNC: 29 MG/DL (ref 8–23)
CALCIUM SERPL-MCNC: 9.3 MG/DL (ref 8.3–10.4)
CALCULATED P AXIS, ECG09: 21 DEGREES
CALCULATED R AXIS, ECG10: 6 DEGREES
CALCULATED T AXIS, ECG11: 64 DEGREES
CHLORIDE SERPL-SCNC: 105 MMOL/L (ref 98–107)
CHOLEST SERPL-MCNC: 142 MG/DL
CO2 SERPL-SCNC: 30 MMOL/L (ref 21–32)
CREAT SERPL-MCNC: 1.8 MG/DL (ref 0.8–1.5)
DIAGNOSIS, 93000: NORMAL
ECHO AO ASC DIAM: 4.3 CM
ECHO AO ASCENDING AORTA INDEX: 1.81 CM/M2
ECHO AO ROOT DIAM: 3.5 CM
ECHO AO ROOT INDEX: 1.48 CM/M2
ECHO AV AREA PEAK VELOCITY: 3.9 CM2
ECHO AV AREA VTI: 4.7 CM2
ECHO AV AREA/BSA PEAK VELOCITY: 1.6 CM2/M2
ECHO AV AREA/BSA VTI: 2 CM2/M2
ECHO AV MEAN GRADIENT: 3 MMHG
ECHO AV MEAN VELOCITY: 0.8 M/S
ECHO AV PEAK GRADIENT: 5 MMHG
ECHO AV PEAK VELOCITY: 1.1 M/S
ECHO AV VELOCITY RATIO: 0.82
ECHO AV VTI: 21 CM
ECHO IVC PROX: 1.3 CM
ECHO LA AREA 2C: 24.8 CM2
ECHO LA AREA 4C: 19.9 CM2
ECHO LA MAJOR AXIS: 6 CM
ECHO LA MINOR AXIS: 5.9 CM
ECHO LA VOL BP: 69 ML (ref 18–58)
ECHO LA VOL/BSA BIPLANE: 29 ML/M2 (ref 16–34)
ECHO LV EDV A2C: 120 ML
ECHO LV EDV A4C: 106 ML
ECHO LV EDV INDEX A4C: 45 ML/M2
ECHO LV EDV NDEX A2C: 51 ML/M2
ECHO LV EJECTION FRACTION A2C: 65 %
ECHO LV EJECTION FRACTION A4C: 62 %
ECHO LV EJECTION FRACTION BIPLANE: 65 % (ref 55–100)
ECHO LV ESV A2C: 42 ML
ECHO LV ESV A4C: 40 ML
ECHO LV ESV INDEX A2C: 18 ML/M2
ECHO LV ESV INDEX A4C: 17 ML/M2
ECHO LV FRACTIONAL SHORTENING: 38 % (ref 28–44)
ECHO LV INTERNAL DIMENSION DIASTOLE INDEX: 1.65 CM/M2
ECHO LV INTERNAL DIMENSION DIASTOLIC: 3.9 CM (ref 4.2–5.9)
ECHO LV INTERNAL DIMENSION SYSTOLIC INDEX: 1.01 CM/M2
ECHO LV INTERNAL DIMENSION SYSTOLIC: 2.4 CM
ECHO LV IVSD: 1.3 CM (ref 0.6–1)
ECHO LV MASS 2D: 179.7 G (ref 88–224)
ECHO LV MASS INDEX 2D: 75.8 G/M2 (ref 49–115)
ECHO LV POSTERIOR WALL DIASTOLIC: 1.3 CM (ref 0.6–1)
ECHO LV RELATIVE WALL THICKNESS RATIO: 0.67
ECHO LVOT AREA: 4.9 CM2
ECHO LVOT AV VTI INDEX: 0.96
ECHO LVOT DIAM: 2.5 CM
ECHO LVOT MEAN GRADIENT: 2 MMHG
ECHO LVOT PEAK GRADIENT: 3 MMHG
ECHO LVOT PEAK VELOCITY: 0.9 M/S
ECHO LVOT STROKE VOLUME INDEX: 41.8 ML/M2
ECHO LVOT SV: 99.1 ML
ECHO LVOT VTI: 20.2 CM
ECHO RV BASAL DIMENSION: 3.6 CM
ECHO RV TAPSE: 1.7 CM (ref 1.5–2)
ECHO TV REGURGITANT MAX VELOCITY: 1.66 M/S
ECHO TV REGURGITANT PEAK GRADIENT: 11 MMHG
ERYTHROCYTE [DISTWIDTH] IN BLOOD BY AUTOMATED COUNT: 13.2 % (ref 11.9–14.6)
GLUCOSE SERPL-MCNC: 133 MG/DL (ref 65–100)
HCT VFR BLD AUTO: 46.8 % (ref 41.1–50.3)
HDLC SERPL-MCNC: 28 MG/DL (ref 40–60)
HDLC SERPL: 5.1 {RATIO}
HGB BLD-MCNC: 15.2 G/DL (ref 13.6–17.2)
LDLC SERPL CALC-MCNC: 51 MG/DL
MCH RBC QN AUTO: 28.4 PG (ref 26.1–32.9)
MCHC RBC AUTO-ENTMCNC: 32.5 G/DL (ref 31.4–35)
MCV RBC AUTO: 87.5 FL (ref 79.6–97.8)
NRBC # BLD: 0 K/UL (ref 0–0.2)
P-R INTERVAL, ECG05: 152 MS
PLATELET # BLD AUTO: 355 K/UL (ref 150–450)
PMV BLD AUTO: 9.3 FL (ref 9.4–12.3)
POTASSIUM SERPL-SCNC: 3.4 MMOL/L (ref 3.5–5.1)
Q-T INTERVAL, ECG07: 432 MS
QRS DURATION, ECG06: 90 MS
QTC CALCULATION (BEZET), ECG08: 434 MS
RBC # BLD AUTO: 5.35 M/UL (ref 4.23–5.6)
SODIUM SERPL-SCNC: 138 MMOL/L (ref 136–145)
TRIGL SERPL-MCNC: 315 MG/DL (ref 35–150)
VENTRICULAR RATE, ECG03: 61 BPM
VLDLC SERPL CALC-MCNC: 63 MG/DL (ref 6–23)
WBC # BLD AUTO: 9.9 K/UL (ref 4.3–11.1)

## 2022-03-23 PROCEDURE — 36415 COLL VENOUS BLD VENIPUNCTURE: CPT

## 2022-03-23 PROCEDURE — 74011250636 HC RX REV CODE- 250/636: Performed by: NURSE PRACTITIONER

## 2022-03-23 PROCEDURE — 99152 MOD SED SAME PHYS/QHP 5/>YRS: CPT

## 2022-03-23 PROCEDURE — 74011000258 HC RX REV CODE- 258: Performed by: NURSE PRACTITIONER

## 2022-03-23 PROCEDURE — 80061 LIPID PANEL: CPT

## 2022-03-23 PROCEDURE — 93325 DOPPLER ECHO COLOR FLOW MAPG: CPT | Performed by: INTERNAL MEDICINE

## 2022-03-23 PROCEDURE — 74011000250 HC RX REV CODE- 250: Performed by: INTERNAL MEDICINE

## 2022-03-23 PROCEDURE — 92960 CARDIOVERSION ELECTRIC EXT: CPT | Performed by: INTERNAL MEDICINE

## 2022-03-23 PROCEDURE — 93005 ELECTROCARDIOGRAM TRACING: CPT | Performed by: INTERNAL MEDICINE

## 2022-03-23 PROCEDURE — 93312 ECHO TRANSESOPHAGEAL: CPT | Performed by: INTERNAL MEDICINE

## 2022-03-23 PROCEDURE — 96375 TX/PRO/DX INJ NEW DRUG ADDON: CPT

## 2022-03-23 PROCEDURE — 99152 MOD SED SAME PHYS/QHP 5/>YRS: CPT | Performed by: INTERNAL MEDICINE

## 2022-03-23 PROCEDURE — 74011250637 HC RX REV CODE- 250/637: Performed by: NURSE PRACTITIONER

## 2022-03-23 PROCEDURE — C8925 2D TEE W OR W/O FOL W/CON,IN: HCPCS

## 2022-03-23 PROCEDURE — 74011250636 HC RX REV CODE- 250/636: Performed by: INTERNAL MEDICINE

## 2022-03-23 PROCEDURE — C8929 TTE W OR WO FOL WCON,DOPPLER: HCPCS

## 2022-03-23 PROCEDURE — 74011000250 HC RX REV CODE- 250: Performed by: NURSE PRACTITIONER

## 2022-03-23 PROCEDURE — G0378 HOSPITAL OBSERVATION PER HR: HCPCS

## 2022-03-23 PROCEDURE — 85027 COMPLETE CBC AUTOMATED: CPT

## 2022-03-23 PROCEDURE — 99024 POSTOP FOLLOW-UP VISIT: CPT | Performed by: INTERNAL MEDICINE

## 2022-03-23 PROCEDURE — 80048 BASIC METABOLIC PNL TOTAL CA: CPT

## 2022-03-23 PROCEDURE — 93320 DOPPLER ECHO COMPLETE: CPT | Performed by: INTERNAL MEDICINE

## 2022-03-23 PROCEDURE — 96376 TX/PRO/DX INJ SAME DRUG ADON: CPT

## 2022-03-23 RX ORDER — MIDAZOLAM HYDROCHLORIDE 1 MG/ML
.5-2 INJECTION, SOLUTION INTRAMUSCULAR; INTRAVENOUS
Status: DISCONTINUED | OUTPATIENT
Start: 2022-03-23 | End: 2022-03-23

## 2022-03-23 RX ORDER — FENTANYL CITRATE 50 UG/ML
25-50 INJECTION, SOLUTION INTRAMUSCULAR; INTRAVENOUS
Status: DISCONTINUED | OUTPATIENT
Start: 2022-03-23 | End: 2022-03-23 | Stop reason: HOSPADM

## 2022-03-23 RX ORDER — ATORVASTATIN CALCIUM 20 MG/1
20 TABLET, FILM COATED ORAL DAILY
Qty: 90 TABLET | Refills: 3 | Status: SHIPPED | OUTPATIENT
Start: 2022-03-23 | End: 2022-06-21

## 2022-03-23 RX ORDER — POTASSIUM CHLORIDE 14.9 MG/ML
20 INJECTION INTRAVENOUS ONCE
Status: COMPLETED | OUTPATIENT
Start: 2022-03-23 | End: 2022-03-23

## 2022-03-23 RX ORDER — LIDOCAINE HYDROCHLORIDE 20 MG/ML
15 SOLUTION OROPHARYNGEAL AS NEEDED
Status: DISCONTINUED | OUTPATIENT
Start: 2022-03-23 | End: 2022-03-23

## 2022-03-23 RX ADMIN — MIDAZOLAM HYDROCHLORIDE 2 MG: 1 INJECTION, SOLUTION INTRAMUSCULAR; INTRAVENOUS at 11:16

## 2022-03-23 RX ADMIN — MIDAZOLAM HYDROCHLORIDE 1 MG: 1 INJECTION, SOLUTION INTRAMUSCULAR; INTRAVENOUS at 11:18

## 2022-03-23 RX ADMIN — ATORVASTATIN CALCIUM 20 MG: 20 TABLET, FILM COATED ORAL at 09:34

## 2022-03-23 RX ADMIN — SODIUM CHLORIDE, PRESERVATIVE FREE 5 ML: 5 INJECTION INTRAVENOUS at 14:00

## 2022-03-23 RX ADMIN — SODIUM CHLORIDE, PRESERVATIVE FREE 10 ML: 5 INJECTION INTRAVENOUS at 05:30

## 2022-03-23 RX ADMIN — LIDOCAINE HYDROCHLORIDE 45 ML: 20 SOLUTION ORAL; TOPICAL at 11:15

## 2022-03-23 RX ADMIN — SODIUM CHLORIDE 5 MG/HR: 900 INJECTION, SOLUTION INTRAVENOUS at 05:29

## 2022-03-23 RX ADMIN — FENTANYL CITRATE 25 MCG: 50 INJECTION, SOLUTION INTRAMUSCULAR; INTRAVENOUS at 11:16

## 2022-03-23 RX ADMIN — MIDAZOLAM HYDROCHLORIDE 1 MG: 1 INJECTION, SOLUTION INTRAMUSCULAR; INTRAVENOUS at 11:19

## 2022-03-23 RX ADMIN — METOPROLOL SUCCINATE 100 MG: 100 TABLET, EXTENDED RELEASE ORAL at 09:35

## 2022-03-23 RX ADMIN — POTASSIUM CHLORIDE 20 MEQ: 14.9 INJECTION, SOLUTION INTRAVENOUS at 09:35

## 2022-03-23 RX ADMIN — FENTANYL CITRATE 25 MCG: 50 INJECTION, SOLUTION INTRAMUSCULAR; INTRAVENOUS at 11:19

## 2022-03-23 RX ADMIN — PERFLUTREN 1 ML: 6.52 INJECTION, SUSPENSION INTRAVENOUS at 09:30

## 2022-03-23 NOTE — PROGRESS NOTES
Problem: Falls - Risk of  Goal: *Absence of Falls  Description: Document Darlen Harrison Fall Risk and appropriate interventions in the flowsheet.   Outcome: Progressing Towards Goal  Note: Fall Risk Interventions:            Medication Interventions: Teach patient to arise slowly                   Problem: Afib Pathway: Day 1  Goal: Activity/Safety  Outcome: Progressing Towards Goal  Goal: Diagnostic Test/Procedures  Outcome: Progressing Towards Goal  Goal: Medications  Outcome: Progressing Towards Goal  Goal: Respiratory  Outcome: Progressing Towards Goal  Goal: Treatments/Interventions/Procedures  Outcome: Progressing Towards Goal  Goal: Psychosocial  Outcome: Progressing Towards Goal  Goal: *Optimal pain control at patient's stated goal  Outcome: Progressing Towards Goal  Goal: *Hemodynamically stable  Outcome: Progressing Towards Goal  Goal: *Stable cardiac rhythm  Outcome: Progressing Towards Goal  Goal: *Lungs clear or at baseline  Outcome: Progressing Towards Goal  Goal: *Labs within defined limits  Outcome: Progressing Towards Goal  Goal: *Describes available resources and support systems  Outcome: Progressing Towards Goal

## 2022-03-23 NOTE — PROGRESS NOTES
TRANSFER - OUT REPORT:    Verbal report given to RN(name) on Jackson Flakes being transferred to CPR (unit) for routine post - op       Report consisted of patient's Situation, Background, Assessment and   Recommendations(SBAR). Information from the following report(s) SBAR was reviewed with the receiving nurse. Opportunity for questions and clarification was provided.       Patient transported with:   Monitor

## 2022-03-23 NOTE — PROGRESS NOTES
Pt admitted with Afib with RVR. POC for pt to undergo DCCV. Care Management Interventions  PCP Verified by CM: Yes (Sky Andrews)  Last Visit to PCP: 03/15/21  Mode of Transport at Discharge: Other (see comment) (Son)  Discharge Durable Medical Equipment: No  Physical Therapy Consult: No  Occupational Therapy Consult: No  Support Systems: Child(chano)  Confirm Follow Up Transport: Self  Discharge Location  Patient Expects to be Discharged to[de-identified] Home  Chart screened by  for discharge planning. No needs identified at this time. Please consult  if any new issues arise. Addendum: 1626  CM offered pt Eliquis $10 copay card with commercial insurance at discharge.

## 2022-03-23 NOTE — PROCEDURES
[address] [facility]  CARDIAC CATH    Name:  Terrel Runner  MR#:  [id number]  :  1960  ACCOUNT #:  [de-identified]  DATE OF SERVICE:  2022    PRIMARY CARE:  Name    PROCEDURE PERFORMED:  Transesophageal echo with elective cardioversion. PREOPERATIVE DIAGNOSIS:  Persistent atrial fibrillation with rapid ventricular response. POSTOPERATIVE DIAGNOSIS:  Persistent atrial fibrillation with rapid ventricular response. SURGEON:  Gypsy Hernandez MD    ASSISTANT:  None    ESTIMATED BLOOD LOSS:  None. SPECIMENS REMOVED:  None. COMPLICATIONS:  None. IMPLANTS:  None. ANESTHESIA:  The patient was sedated by Eddie Campos RN with 4 mg Versed and 50 mcg fentanyl and monitored without complication from 91:18 a.m. to 11:27 a.m. PROCEDURE:  After informed consent was obtained, the patient was brought to the cath lab, prepped and draped in the usual fashion. The oropharynx was anesthetized with topical Cetacaine spray and viscous lidocaine and the transesophageal echo probe easily advanced to approximately 40 cm with appropriate images obtained. Transesophageal echo demonstrated mild valvular regurgitation, no stenosis, normal LV function and a large left atrial appendage which was free of thrombus. We interrogated the appendage with two-dimensional, three-dimensional, and xPlane imaging. After successful SHAE was completed, the patient continued to remain adequately sedated for a single shock of 360 joules biphasic energy, resulting in normal sinus rhythm at 65 beats per minute. The patient tolerated the procedure well. CONCLUSION:  Successful SHAE and cardioversion as described above. Dante Mijares MD      [id number]  D:  2022 12:18  T:  2022 16:00  JOB #:  [id number]  CC:   [facility] Cardiology

## 2022-03-23 NOTE — PROGRESS NOTES
[facility] CARDIOLOGY PROGRESS NOTE    3/23/2022 8:05 AM    Admit Date: 3/22/2022        Subjective:   Stable overnight without angina, CHF, or palpitations. Vitals stable and controlled but remains in rate controlled atrial fibrillation. No other complaints overnight. Tolerating meds well. Objective:      Vitals:    03/23/22 0338 03/23/22 0355 03/23/22 0418 03/23/22 0730   BP: 103/62   109/83   Pulse: 85 83  90   Resp: 18   19   Temp: 97 °F (36.1 °C)   97.4 °F (36.3 °C)   SpO2: 91%   98%   Weight:   251 lb 12.8 oz (114.2 kg)    Height:           Physical Exam:  Neck- supple, no JVD  CV- irregular rate and rhythm no MRG  Lung- clear bilaterally  Abd- soft, nontender, nondistended  Ext- no edema  Skin- warm and dry    Data Review:   Recent Labs     03/23/22  0622 03/22/22  1321    139   K 3.4* 4.2   MG  --  2.0   BUN 29* 30*   CREA 1.80* 2.00*   * 130*   WBC 9.9 12.7*   HGB 15.2 15.4   HCT 46.8 46.7    406   CHOL 142  --    TRIGL 315*  --    HDL 28*  --        Assessment and Plan:     Principal Problem:    Atrial fibrillation with RVR (HCC) (3/22/2022)- persistent, rate controlled. Continue meds, SHAE/DCCV later today. Risks and benefits discussed, he wishes to proceed. Active Problems:    Hypertension (9/14/2017)  - stable, continue meds      Obesity, unspecified (9/20/2017)- rehab/nutrition counseling as outpatient      CKD (chronic kidney disease) stage 3, GFR 30-59 ml/min (formerly [facility]) (11/16/2020)- stable, recheck Nuzhat      HLD (hyperlipidemia) (3/22/2022) - stable, continue meds     Snoring - overnight oximetry pending.          Tamiko Goodson MD  [address] Cardiology  Pager [phone]

## 2022-03-23 NOTE — PROGRESS NOTES
Problem: Falls - Risk of  Goal: *Absence of Falls  Description: Document Darlen [place] Fall Risk and appropriate interventions in the flowsheet.   Outcome: Progressing Towards Goal  Note: Fall Risk Interventions:            Medication Interventions: Patient to call before getting OOB,Teach patient to arise slowly                   Problem: Patient Education: Go to Patient Education Activity  Goal: Patient/Family Education  Outcome: Progressing Towards Goal     Problem: Patient Education: Go to Patient Education Activity  Goal: Patient/Family Education  Outcome: Progressing Towards Goal     Problem: Afib Pathway: Day 1  Goal: Off Pathway (Use only if patient is Off Pathway)  Outcome: Progressing Towards Goal     Problem: Afib Pathway: Day 1  Goal: Activity/Safety  Outcome: Progressing Towards Goal     Problem: Afib Pathway: Day 1  Goal: Consults, if ordered  Outcome: Progressing Towards Goal     Problem: Afib Pathway: Day 1  Goal: Diagnostic Test/Procedures  Outcome: Progressing Towards Goal     Problem: Afib Pathway: Day 1  Goal: Nutrition/Diet  Outcome: Progressing Towards Goal     Problem: Afib Pathway: Day 1  Goal: Discharge Planning  Outcome: Progressing Towards Goal     Problem: Afib Pathway: Day 1  Goal: Medications  Outcome: Progressing Towards Goal     Problem: Afib Pathway: Day 1  Goal: Respiratory  Outcome: Progressing Towards Goal     Problem: Afib Pathway: Day 1  Goal: Treatments/Interventions/Procedures  Outcome: Progressing Towards Goal     Problem: Afib Pathway: Day 1  Goal: Psychosocial  Outcome: Progressing Towards Goal     Problem: Afib Pathway: Day 1  Goal: *Optimal pain control at patient's stated goal  Outcome: Progressing Towards Goal     Problem: Afib Pathway: Day 1  Goal: *Hemodynamically stable  Outcome: Progressing Towards Goal     Problem: Afib Pathway: Day 1  Goal: *Hemodynamically stable  Outcome: Progressing Towards Goal     Problem: Afib Pathway: Day 1  Goal: *Stable cardiac rhythm  Outcome: Progressing Towards Goal     Problem: Afib Pathway: Day 1  Goal: *Lungs clear or at baseline  Outcome: Progressing Towards Goal     Problem: Afib Pathway: Day 1  Goal: *Labs within defined limits  Outcome: Progressing Towards Goal     Problem: Afib Pathway: Day 1  Goal: *Describes available resources and support systems  Outcome: Progressing Towards Goal

## 2022-03-23 NOTE — PROGRESS NOTES
Problem: Falls - Risk of  Goal: *Absence of Falls  Description: Document [place] Fail Fall Risk and appropriate interventions in the flowsheet.   Outcome: Resolved/Met     Problem: Patient Education: Go to Patient Education Activity  Goal: Patient/Family Education  Outcome: Resolved/Met     Problem: Patient Education: Go to Patient Education Activity  Goal: Patient/Family Education  Outcome: Resolved/Met     Problem: Afib Pathway: Day 1  Goal: Off Pathway (Use only if patient is Off Pathway)  Outcome: Resolved/Met  Goal: Activity/Safety  Outcome: Resolved/Met  Goal: Consults, if ordered  Outcome: Resolved/Met  Goal: Diagnostic Test/Procedures  Outcome: Resolved/Met  Goal: Nutrition/Diet  Outcome: Resolved/Met  Goal: Discharge Planning  Outcome: Resolved/Met  Goal: Medications  Outcome: Resolved/Met  Goal: Respiratory  Outcome: Resolved/Met  Goal: Treatments/Interventions/Procedures  Outcome: Resolved/Met  Goal: Psychosocial  Outcome: Resolved/Met  Goal: *Optimal pain control at patient's stated goal  Outcome: Resolved/Met  Goal: *Hemodynamically stable  Outcome: Resolved/Met  Goal: *Stable cardiac rhythm  Outcome: Resolved/Met  Goal: *Lungs clear or at baseline  Outcome: Resolved/Met  Goal: *Labs within defined limits  Outcome: Resolved/Met  Goal: *Describes available resources and support systems  Outcome: Resolved/Met     Problem: Afib Pathway: Day 2  Goal: Off Pathway (Use only if patient is Off Pathway)  Outcome: Resolved/Met  Goal: Activity/Safety  Outcome: Resolved/Met  Goal: Consults, if ordered  Outcome: Resolved/Met  Goal: Diagnostic Test/Procedures  Outcome: Resolved/Met  Goal: Nutrition/Diet  Outcome: Resolved/Met  Goal: Discharge Planning  Outcome: Resolved/Met  Goal: Medications  Outcome: Resolved/Met  Goal: Respiratory  Outcome: Resolved/Met  Goal: Treatments/Interventions/Procedures  Outcome: Resolved/Met  Goal: Psychosocial  Outcome: Resolved/Met  Goal: *Hemodynamically stable  Outcome: Resolved/Met  Goal: *Optimal pain control at patient's stated goal  Outcome: Resolved/Met  Goal: *Stable cardiac rhythm  Outcome: Resolved/Met  Goal: *Lungs clear or at baseline  Outcome: Resolved/Met  Goal: *Describes available resources and support systems  Outcome: Resolved/Met     Problem: Afib Pathway: Day 3  Goal: Off Pathway (Use only if patient is Off Pathway)  Outcome: Resolved/Met  Goal: Activity/Safety  Outcome: Resolved/Met  Goal: Diagnostic Test/Procedures  Outcome: Resolved/Met  Goal: Nutrition/Diet  Outcome: Resolved/Met  Goal: Discharge Planning  Outcome: Resolved/Met  Goal: Medications  Outcome: Resolved/Met  Goal: Respiratory  Outcome: Resolved/Met  Goal: Treatments/Interventions/Procedures  Outcome: Resolved/Met  Goal: Psychosocial  Outcome: Resolved/Met     Problem: Afib: Discharge Outcomes (not in CAT)  Goal: *Hemodynamically stable  Outcome: Resolved/Met  Goal: *Stable cardiac rhythm  Outcome: Resolved/Met  Goal: *Lungs clear or at baseline  Outcome: Resolved/Met  Goal: *Optimal pain control at patient's stated goal  Outcome: Resolved/Met  Goal: *Identifies cardiac risk factors  Outcome: Resolved/Met  Goal: *Verbalizes home exercise program, activity guidelines, cardiac precautions  Outcome: Resolved/Met  Goal: *Verbalizes understanding and describes prescribed diet  Outcome: Resolved/Met  Goal: *Verbalizes understanding and describes medication purposes and frequencies  Outcome: Resolved/Met  Goal: *Anxiety reduced or absent  Outcome: Resolved/Met  Goal: *Understands and describes signs and symptoms to report to providers(Stroke Metric)  Outcome: Resolved/Met  Goal: *Describes follow-up/return visits to physicians  Outcome: Resolved/Met  Goal: *Describes available resources and support systems  Outcome: Resolved/Met  Goal: *Influenza immunization  Outcome: Resolved/Met  Goal: *Pneumococcal immunization  Outcome: Resolved/Met  Goal: *Describes smoking cessation resources  Outcome: Resolved/Met

## 2022-03-23 NOTE — DISCHARGE INSTRUCTIONS
Patient Education        Atrial Fibrillation: Care Instructions  Your Care Instructions     Atrial fibrillation is an irregular and often fast heartbeat. Treating this condition is important for several reasons. It can cause blood clots, which can travel from your heart to your brain and cause a stroke. If you have a fast heartbeat, you may feel lightheaded, dizzy, and weak. An irregular heartbeat can also increase your risk for heart failure. Atrial fibrillation is often the result of another heart condition, such as high blood pressure or coronary artery disease. Making changes to improve your heart condition will help you stay healthy and active. Follow-up care is a key part of your treatment and safety. Be sure to make and go to all appointments, and call your doctor if you are having problems. It's also a good idea to know your test results and keep a list of the medicines you take. How can you care for yourself at home? Medicines    · Take your medicines exactly as prescribed. Call your doctor if you think you are having a problem with your medicine. You will get more details on the specific medicines your doctor prescribes.     · If your doctor has given you a blood thinner to prevent a stroke, be sure you get instructions about how to take your medicine safely. Blood thinners can cause serious bleeding problems.     · Do not take any vitamins, over-the-counter drugs, or herbal products without talking to your doctor first.   Lifestyle changes    · Do not smoke. Smoking can increase your chance of a stroke and heart attack. If you need help quitting, talk to your doctor about stop-smoking programs and medicines. These can increase your chances of quitting for good.     · Eat a heart-healthy diet.     · Stay at a healthy weight. Lose weight if you need to.     · Limit alcohol to 2 drinks a day for men and 1 drink a day for women. Too much alcohol can cause health problems.     · Avoid colds and flu.  Get a pneumococcal vaccine shot. If you have had one before, ask your doctor whether you need another dose. Get a flu shot every year. If you must be around people with colds or flu, wash your hands often. Activity    · If your doctor recommends it, get more exercise. Walking is a good choice. Bit by bit, increase the amount you walk every day. Try for at least 30 minutes on most days of the week. You also may want to swim, bike, or do other activities. Your doctor may suggest that you join a cardiac rehabilitation program so that you can have help increasing your physical activity safely.     · Start light exercise if your doctor says it is okay. Even a small amount will help you get stronger, have more energy, and manage stress. Walking is an easy way to get exercise. Start out by walking a little more than you did in the hospital. Gradually increase the amount you walk.     · When you exercise, watch for signs that your heart is working too hard. You are pushing too hard if you cannot talk while you are exercising. If you become short of breath or dizzy or have chest pain, sit down and rest immediately.     · Check your pulse regularly. Place two fingers on the artery at the palm side of your wrist, in line with your thumb. If your heartbeat seems uneven or fast, talk to your doctor. When should you call for help? Call 911 anytime you think you may need emergency care. For example, call if:    · You have symptoms of a heart attack. These may include:  ? Chest pain or pressure, or a strange feeling in the chest.  ? Sweating. ? Shortness of breath. ? Nausea or vomiting. ? Pain, pressure, or a strange feeling in the back, neck, jaw, or upper belly or in one or both shoulders or arms. ? Lightheadedness or sudden weakness. ? A fast or irregular heartbeat. After you call 911, the  may tell you to chew 1 adult-strength or 2 to 4 low-dose aspirin. Wait for an ambulance.  Do not try to drive yourself.     · You have symptoms of a stroke. These may include:  ? Sudden numbness, tingling, weakness, or loss of movement in your face, arm, or leg, especially on only one side of your body. ? Sudden vision changes. ? Sudden trouble speaking. ? Sudden confusion or trouble understanding simple statements. ? Sudden problems with walking or balance. ? A sudden, severe headache that is different from past headaches.     · You passed out (lost consciousness). Call your doctor now or seek immediate medical care if:    · You have new or increased shortness of breath.     · You feel dizzy or lightheaded, or you feel like you may faint.     · Your heart rate becomes irregular.     · You can feel your heart flutter in your chest or skip heartbeats. Tell your doctor if these symptoms are new or worse. Watch closely for changes in your health, and be sure to contact your doctor if you have any problems. Where can you learn more? Go to http://www.gray.com/  Enter U020 in the search box to learn more about \"Atrial Fibrillation: Care Instructions. \"  Current as of: January 10, 2022               Content Version: 13.2  © 7230-2436 [employer]. Care instructions adapted under license by [employer] (which disclaims liability or warranty for this information). If you have questions about a medical condition or this instruction, always ask your healthcare professional. [address] any warranty or liability for your use of this information. Patient Education        Learning About Atrial Fibrillation  What is atrial fibrillation? Atrial fibrillation (say \"AY-tree-anitha kyl-kkjw-BJU-shun\") is a common type of irregular heartbeat (arrhythmia). Normally, the heart beats in a strong, steady rhythm.  In atrial fibrillation, a problem with the heart's electrical system causes the two upper chambers of the heart (called the atria) to quiver, or fibrillate. Atrial fibrillation can be dangerous. This is because if the heartbeat isn't strong and steady, blood can collect, or pool, in the atria. And pooled blood is more likely to form clots. Clots can travel to the brain, block blood flow, and cause a stroke. Atrial fibrillation can also lead to heart failure. This condition also upsets the normal rhythm between the atria and the lower chambers of the heart. (These chambers are called the ventricles.) The ventricles may beat fast and without a regular rhythm. What are the symptoms? Some people feel symptoms when they have episodes of atrial fibrillation. But other people don't notice any symptoms. If you have symptoms, you may feel:  · A fluttering, racing, or pounding feeling in your chest called palpitations. · Weak or tired. · Dizzy or lightheaded. · Short of breath. · Chest pain. · Confused. You may notice signs of atrial fibrillation when you check your pulse. Your pulse may seem uneven or fast.  What can you expect when you have atrial fibrillation? At first, spells of atrial fibrillation may come on suddenly and last a short time. They may go away on their own or with treatment. Over time, the spells may last longer and occur more often. They often don't go away on their own. How is it treated? Treatments can help you feel better and prevent future problems, especially stroke and heart failure. Your treatment will depend on the cause of your atrial fibrillation, your symptoms, and your risk for stroke. Types of treatment include:  · Heart rate treatment. Medicine may be used to slow your heart rate. Your heartbeat may still be irregular. But these medicines keep your heart from beating too fast. They may also help relieve symptoms. · Heart rhythm treatment. Different treatments may be used to try to stop atrial fibrillation and keep it from returning. They can also relieve symptoms.  These treatments include medicine, electrical cardioversion to shock the heart back to a normal rhythm, a procedure called catheter ablation, and heart surgery. · Stroke prevention. You and your doctor can decide how to lower your risk. You may decide to take a blood-thinning medicine called an anticoagulant. What is a heart-healthy lifestyle for atrial fibrillation? You can live well and help manage atrial fibrillation by having a heart-healthy lifestyle. This lifestyle may help reduce how often you have episodes of atrial fibrillation. Don't smoke. Avoid secondhand smoke too. Quitting smoking is the best thing you can do for your heart. Be active. Talk to your doctor about what type and level of exercise is safe for you. Eat a heart-healthy diet. These foods include vegetables, fruits, nuts, beans, lean meat, fish, and whole grains. Limit sodium, alcohol, and sugar. Avoid alcohol if it triggers symptoms. Stay at a healthy weight. Lose weight if you need to. Losing weight can help relieve symptoms. Manage other health problems. These problems include diabetes, high blood pressure, and high cholesterol. If you think you may have a problem with alcohol or drug use, talk to your doctor. Manage stress. Options like yoga, biofeedback, and meditation may help. Where can you learn more? Go to http://www.gray.com/  Enter L274 in the search box to learn more about \"Learning About Atrial Fibrillation. \"  Current as of: January 10, 2022               Content Version: 13.2  © 3563-9989 [employer]. Care instructions adapted under license by [employer] (which disclaims liability or warranty for this information). If you have questions about a medical condition or this instruction, always ask your healthcare professional. [place] [address] any warranty or liability for your use of this information.

## 2022-03-23 NOTE — PROGRESS NOTES
TRANSFER - IN REPORT:    Verbal report received from RN (name) on Sherryle Peaches  being received from [address] [place] [address] (unit) for ordered procedure      Report consisted of patients Situation, Background, Assessment and   Recommendations(SBAR). Information from the following report(s) SBAR was reviewed with the receiving nurse. Opportunity for questions and clarification was provided. Assessment completed upon patients arrival to unit and care assumed.

## 2022-03-23 NOTE — PROGRESS NOTES
Discharge instructions reviewed with patient. Opportunity for questions provided. Patient voiced understanding of all discharge instructions.      IV and tele box removed

## 2022-03-23 NOTE — DISCHARGE SUMMARY
[facility] Cardiology Discharge Summary     Patient ID:  Kenneth Cardona  [id number]  1 y.o.  1960    Admit date: 3/22/2022    Discharge date:  03/23/22    Admitting Physician: Gamal Mcdermott MD     Discharge Physician: Yossi Nettles NP/Dr. Wong Akhtar    Admission Diagnoses: Atrial fibrillation with RVR [facility]) [I48.91]    Discharge Diagnoses:      Diagnosis    Atrial fibrillation with RVR ([address]    HLD (hyperlipidemia)    CKD (chronic kidney disease) stage 3, GFR 30-59 ml/min (HCC)    Obesity, unspecified    Hypertension       Cardiology Procedures this admission:  SHAE/Cardioversion  Consults: None    Hospital Course: Patient presented to the emergency department of [facility] with complaints of palpitations and shortness of breath. The patient was noted to be in atrial fibrillation with RVR on arrival.  A Cardizem bolus was given in the ER with good response. The patient was admitted to telemetry and Cardizem drip was continued. At home the patient was only taking Eliquis 1 time a day due to cost.  He states he has a lot saved up at home now. The patient was monitored on telemetry. SHAE/Cardioversion was planned. The patient underwent SHAE that was negative for thrombus and then underwent successful cardioversion from atrial fibrillation to sinus rhythm. Full SHAE results:        Left Ventricle: Left ventricle size is normal. Normal wall thickness. Normal wall motion. Normal left ventricular systolic function with a visually estimated EF of 55 - 60%. Mitral Valve: Mild to moderate transvalvular regurgitation with a centrally directed jet. Tricuspid Valve: Trace transvalvular regurgitation. Pulmonic Valve: Trace transvalvular regurgitation. Left Atrium: Left atrium is moderately dilated. Normal sized appendage. Normal appendage flow velocity. No left atrial appendage thrombus noted. No left atrial appendage mass noted. Right Atrium: Right atrium is mildly dilated. Contrast used: Definity. Proceed with elective cardioversion of persistent atrial fibrillation      The patient felt much better back in sinus rhythm. On 03/23/22 the patient was feeling much better and remained in sinus rhythm. The patient was seen and examined by Dr. Ulises Loredo and was determined stable and ready for discharge home. The patient was instructed on the importance of medication compliance and outpatient follow up. The patient will continue Eliquis twice a day now with his current prescription. He will contact his insurance company to found out the preferred NOAC list for cost savings. He will also contact our office when he has 3 weeks or less of his Eliquis to get a new NOAC prescription. The patient will also have a BMP completed in one week. The patient will follow up with [facility] Dr. Kuldeep Hutton: The patient is being discharged home in stable condition on a low saturated fat, low cholesterol and low salt diet. The patient is instructed to advance activities as tolerated to the limit of fatigue or shortness of breath. The patient is instructed to call the office or return to the ER for immediate evaluation for any severe shortness of breath, chest pain, prolonged palpitations, near syncope or syncope. Discharge Exam:   Visit Vitals  /62   Pulse 87   Temp 97.4 °F (36.3 °C)   Resp 19   Ht 6' 1\" (1.854 m)   Wt 113.9 kg (251 lb)   SpO2 100%   BMI 33.12 kg/m²     Patient has been seen by Dr. Ulises Loredo: see his progress note for exam details.     Recent Results (from the past 24 hour(s))   TROPONIN-HIGH SENSITIVITY    Collection Time: 03/22/22  4:23 PM   Result Value Ref Range    Troponin-High Sensitivity 23.4 (H) 0 - 14 pg/mL   TSH 3RD GENERATION    Collection Time: 03/22/22  5:54 PM   Result Value Ref Range    TSH 1.930 0.358 - 3.509 uIU/mL   METABOLIC PANEL, BASIC    Collection Time: 03/23/22  6:22 AM   Result Value Ref Range    Sodium 138 136 - 145 mmol/L Potassium 3.4 (L) 3.5 - 5.1 mmol/L    Chloride 105 98 - 107 mmol/L    CO2 30 21 - 32 mmol/L    Anion gap 3 (L) 7 - 16 mmol/L    Glucose 133 (H) 65 - 100 mg/dL    BUN 29 (H) 8 - 23 MG/DL    Creatinine 1.80 (H) 0.8 - 1.5 MG/DL    GFR est AA 49 (L) >60 ml/min/1.73m2    GFR est non-AA 41 (L) >60 ml/min/1.73m2    Calcium 9.3 8.3 - 10.4 MG/DL   CBC W/O DIFF    Collection Time: 03/23/22  6:22 AM   Result Value Ref Range    WBC 9.9 4.3 - 11.1 K/uL    RBC 5.35 4.23 - 5.6 M/uL    HGB 15.2 13.6 - 17.2 g/dL    HCT 46.8 41.1 - 50.3 %    MCV 87.5 79.6 - 97.8 FL    MCH 28.4 26.1 - 32.9 PG    MCHC 32.5 31.4 - 35.0 g/dL    RDW 13.2 11.9 - 14.6 %    PLATELET 180 683 - 828 K/uL    MPV 9.3 (L) 9.4 - 12.3 FL    ABSOLUTE NRBC 0.00 0.0 - 0.2 K/uL   LIPID PANEL    Collection Time: 03/23/22  6:22 AM   Result Value Ref Range    Cholesterol, total 142 <200 MG/DL    Triglyceride 315 (H) 35 - 150 MG/DL    HDL Cholesterol 28 (L) 40 - 60 MG/DL    LDL, calculated 51 <100 MG/DL    VLDL, calculated 63 (H) 6.0 - 23.0 MG/DL    CHOL/HDL Ratio 5.1     ECHO ADULT COMPLETE    Collection Time: 03/23/22  9:30 AM   Result Value Ref Range    LV EDV A2C 120 mL    LV EDV A4C 106 mL    LV ESV A2C 42 mL    LV ESV A4C 40 mL    IVSd 1.3 (A) 0.6 - 1.0 cm    LVIDd 3.9 (A) 4.2 - 5.9 cm    LVIDs 2.4 cm    LVOT Diameter 2.5 cm    LVOT Mean Gradient 2 mmHg    LVOT VTI 20.2 cm    LVOT Peak Velocity 0.9 m/s    LVOT Peak Gradient 3 mmHg    LVPWd 1.3 (A) 0.6 - 1.0 cm    LV Ejection Fraction A2C 65 %    LV Ejection Fraction A4C 62 %    EF BP 65 55 - 100 %    LVOT Area 4.9 cm2    LVOT SV 99.1 ml    LA Minor Axis 5.9 cm    LA Major Axis 6.0 cm    LA Area 2C 24.8 cm2    LA Area 4C 19.9 cm2    LA Volume BP 69 (A) 18 - 58 mL    AV Mean Velocity 0.8 m/s    AV Mean Gradient 3 mmHg    AV VTI 21.0 cm    AV Peak Velocity 1.1 m/s    AV Peak Gradient 5 mmHg    AV Area by VTI 4.7 cm2    AV Area by Peak Velocity 3.9 cm2    Aortic Root 3.5 cm    Ascending Aorta 4.3 cm    IVC Proxmal 1.3 cm RV Basal Dimension 3.6 cm    TAPSE 1.7 1.5 - 2.0 cm    TR Max Velocity 1.66 m/s    TR Peak Gradient 11 mmHg    Fractional Shortening 2D 38 28 - 44 %    LV ESV Index A4C 17 mL/m2    LV EDV Index A4C 45 mL/m2    LV ESV Index A2C 18 mL/m2    LV EDV Index A2C 51 mL/m2    LVIDd Index 1.65 cm/m2    LVIDs Index 1.01 cm/m2    LV RWT Ratio 0.67     LV Mass 2D 179.7 88 - 224 g    LV Mass 2D Index 75.8 49 - 115 g/m2    LA Volume Index BP 29 16 - 34 ml/m2    LVOT Stroke Volume Index 41.8 mL/m2    Ao Root Index 1.48 cm/m2    Ascending Aorta Index 1.81 cm/m2    AV Velocity Ratio 0.82     LVOT:AV VTI Index 0.96     JUAN J/BSA VTI 2.0 cm2/m2    JUAN J/BSA Peak Velocity 1.6 cm2/m2   EKG, 12 LEAD, SUBSEQUENT    Collection Time: 03/23/22 11:43 AM   Result Value Ref Range    Ventricular Rate 61 BPM    Atrial Rate 61 BPM    P-R Interval 152 ms    QRS Duration 90 ms    Q-T Interval 432 ms    QTC Calculation (Bezet) 434 ms    Calculated P Axis 21 degrees    Calculated R Axis 6 degrees    Calculated T Axis 64 degrees    Diagnosis       Normal sinus rhythm  Inferior infarct , age undetermined  Abnormal ECG    Confirmed by ST SACHA MELENDEZ MD (), JEFF MORRISON ([id number]) on 3/23/2022 12:45:14 PM           Patient Instructions:     Current Discharge Medication List        CONTINUE these medications which have CHANGED    Details   atorvastatin (LIPITOR) 20 mg tablet Take 1 Tablet by mouth daily for 90 days.  Indications: high cholesterol  Qty: 90 Tablet, Refills: 3  Start date: 3/23/2022, End date: 6/21/2022    Associated Diagnoses: Mixed hyperlipidemia           CONTINUE these medications which have NOT CHANGED    Details   olmesartan-hydroCHLOROthiazide (BENICAR HCT) 40-25 mg per tablet TAKE 1 TABLET BY MOUTH EVERY DAY  Qty: 90 Tablet, Refills: 3    Associated Diagnoses: Essential hypertension      amLODIPine (NORVASC) 10 mg tablet TAKE 1 TABLET BY MOUTH EVERY DAY  Qty: 90 Tablet, Refills: 3    Associated Diagnoses: Essential hypertension      Eliquis 5 mg tablet TAKE 1 TABLET BY MOUTH TWICE A DAY  Qty: 180 Tablet, Refills: 3    Comments: DX Code Needed  . Associated Diagnoses: Atrial fibrillation, unspecified type (HCC)      metoprolol succinate (TOPROL-XL) 100 mg tablet TAKE 1 TABLET BY MOUTH EVERY DAY  Qty: 90 Tablet, Refills: 3    Associated Diagnoses: Essential hypertension      ergocalciferol (ERGOCALCIFEROL) 1,250 mcg (50,000 unit) capsule TAKE 1 CAPSULE BY MOUTH ONE TIME PER WEEK      sildenafil citrate (VIAGRA) 100 mg tablet TAKE 1 TAB BY MOUTH AS NEEDED. Qty: 10 Tab, Refills: 11    Comments: X      clotrimazole-betamethasone (Lotrisone) topical cream Applied to affected area twice daily 45 gm tube  Qty: 15 g, Refills: 0    Associated Diagnoses: Tinea corporis      triamcinolone acetonide (KENALOG) 0.1 % ointment Apply  to affected area two (2) times a day. use thin layer  Qty: 80 g, Refills: 1    Associated Diagnoses: Cellulitis of foot               Signed:  Dong Bello NP  3/23/2022  3:27 PM    ATTENDING ADDENDUM:    Patient seen and examined by me. Agree with above note by physician extender. Key findings are:  No CP or REYES, feeling much better and back to normal after SHAE and cardioversion. EF normal and no severe valvular pathology. Emphasized compliance with twice daily Eliquis, continue other cardiac meds as currently taking. Needs outpatient sleep study. If recurrent arrhythmia in the near future will need to consider ablation. CV- RRR without murmur  Lungs- Clear bilaterally  Abd- soft, nontender, nondistended  Ext- no edema    Plan: As above.     Tamiko Goodson MD  [facility] Card[facility]  Pager [phone]

## 2022-03-24 PROBLEM — E78.5 HLD (HYPERLIPIDEMIA): Status: ACTIVE | Noted: 2022-03-22

## 2022-03-24 PROBLEM — I48.91 ATRIAL FIBRILLATION WITH RVR (HCC): Status: ACTIVE | Noted: 2022-03-22

## 2022-03-24 PROBLEM — I10 HYPERTENSION: Status: ACTIVE | Noted: 2017-09-14

## 2022-11-23 DIAGNOSIS — I48.91 UNSPECIFIED ATRIAL FIBRILLATION (HCC): ICD-10-CM

## 2022-11-23 DIAGNOSIS — I10 ESSENTIAL (PRIMARY) HYPERTENSION: ICD-10-CM

## 2022-11-23 RX ORDER — APIXABAN 5 MG/1
TABLET, FILM COATED ORAL
Qty: 180 TABLET | Refills: 3 | OUTPATIENT
Start: 2022-11-23

## 2022-11-23 RX ORDER — METOPROLOL SUCCINATE 100 MG/1
TABLET, EXTENDED RELEASE ORAL
Qty: 90 TABLET | Refills: 3 | OUTPATIENT
Start: 2022-11-23

## 2022-12-21 DIAGNOSIS — I10 ESSENTIAL (PRIMARY) HYPERTENSION: ICD-10-CM

## 2022-12-22 RX ORDER — AMLODIPINE BESYLATE 10 MG/1
10 TABLET ORAL DAILY
Qty: 90 TABLET | Refills: 3 | OUTPATIENT
Start: 2022-12-22

## 2022-12-22 RX ORDER — OLMESARTAN MEDOXOMIL AND HYDROCHLOROTHIAZIDE 40/25 40; 25 MG/1; MG/1
1 TABLET ORAL DAILY
Qty: 90 TABLET | Refills: 3 | OUTPATIENT
Start: 2022-12-22

## 2023-09-13 ENCOUNTER — TELEPHONE (OUTPATIENT)
Dept: FAMILY MEDICINE CLINIC | Facility: CLINIC | Age: 63
End: 2023-09-13

## 2023-09-19 ENCOUNTER — OFFICE VISIT (OUTPATIENT)
Dept: FAMILY MEDICINE CLINIC | Facility: CLINIC | Age: 63
End: 2023-09-19

## 2023-09-19 VITALS
RESPIRATION RATE: 18 BRPM | HEART RATE: 77 BPM | DIASTOLIC BLOOD PRESSURE: 80 MMHG | OXYGEN SATURATION: 95 % | TEMPERATURE: 97.8 F | SYSTOLIC BLOOD PRESSURE: 130 MMHG | BODY MASS INDEX: 34.03 KG/M2 | HEIGHT: 73 IN | WEIGHT: 256.8 LBS

## 2023-09-19 DIAGNOSIS — N18.9 CHRONIC KIDNEY DISEASE, UNSPECIFIED CKD STAGE: ICD-10-CM

## 2023-09-19 DIAGNOSIS — L30.9 DERMATITIS: ICD-10-CM

## 2023-09-19 DIAGNOSIS — B35.4 TINEA CORPORIS: ICD-10-CM

## 2023-09-19 DIAGNOSIS — I10 PRIMARY HYPERTENSION: ICD-10-CM

## 2023-09-19 DIAGNOSIS — E78.01 FAMILIAL HYPERCHOLESTEROLEMIA: ICD-10-CM

## 2023-09-19 DIAGNOSIS — I10 ESSENTIAL (PRIMARY) HYPERTENSION: Primary | ICD-10-CM

## 2023-09-19 DIAGNOSIS — I48.20 CHRONIC ATRIAL FIBRILLATION (HCC): ICD-10-CM

## 2023-09-19 DIAGNOSIS — N52.9 ERECTILE DYSFUNCTION, UNSPECIFIED ERECTILE DYSFUNCTION TYPE: ICD-10-CM

## 2023-09-19 DIAGNOSIS — Z91.199 NONCOMPLIANCE: ICD-10-CM

## 2023-09-19 LAB
ALBUMIN SERPL-MCNC: 4.1 G/DL (ref 3.2–4.6)
ALBUMIN/GLOB SERPL: 1.3 (ref 0.4–1.6)
ALP SERPL-CCNC: 115 U/L (ref 50–136)
ALT SERPL-CCNC: 36 U/L (ref 12–65)
ANION GAP SERPL CALC-SCNC: 6 MMOL/L (ref 2–11)
AST SERPL-CCNC: 23 U/L (ref 15–37)
BILIRUB SERPL-MCNC: 0.6 MG/DL (ref 0.2–1.1)
BUN SERPL-MCNC: 25 MG/DL (ref 8–23)
CALCIUM SERPL-MCNC: 9.5 MG/DL (ref 8.3–10.4)
CHLORIDE SERPL-SCNC: 106 MMOL/L (ref 101–110)
CO2 SERPL-SCNC: 29 MMOL/L (ref 21–32)
CREAT SERPL-MCNC: 1.9 MG/DL (ref 0.8–1.5)
GLOBULIN SER CALC-MCNC: 3.1 G/DL (ref 2.8–4.5)
GLUCOSE SERPL-MCNC: 117 MG/DL (ref 65–100)
GRANS ABSOLUTE, POC: 5.4 K/UL
GRANULOCYTES %, POC: 58.7 %
HEMATOCRIT, POC: 48 %
HEMOGLOBIN, POC: 16 G/DL
LYMPHOCYTE %, POC: 32.8 %
LYMPHS ABSOLUTE, POC: 3 K/UL
MCH, POC: 30.1 PG (ref 20–?)
MCHC, POC: 33.3
MCV, POC: 90.3
MONOCYTE %, POC: 8.5 %
MONOCYTE, ABSOLUTE POC: 0.8 K/UL
MPV, POC: 7 FL
PLATELET COUNT, POC: 387 K/UL
POTASSIUM SERPL-SCNC: 4 MMOL/L (ref 3.5–5.1)
PROT SERPL-MCNC: 7.2 G/DL (ref 6.3–8.2)
RBC, POC: 5.32 M/UL
RDW, POC: 13.5 %
SODIUM SERPL-SCNC: 141 MMOL/L (ref 133–143)
WBC, POC: 9.2 K/UL

## 2023-09-19 PROCEDURE — 3079F DIAST BP 80-89 MM HG: CPT | Performed by: FAMILY MEDICINE

## 2023-09-19 PROCEDURE — 36415 COLL VENOUS BLD VENIPUNCTURE: CPT | Performed by: FAMILY MEDICINE

## 2023-09-19 PROCEDURE — 99214 OFFICE O/P EST MOD 30 MIN: CPT | Performed by: FAMILY MEDICINE

## 2023-09-19 PROCEDURE — 3075F SYST BP GE 130 - 139MM HG: CPT | Performed by: FAMILY MEDICINE

## 2023-09-19 PROCEDURE — 85025 COMPLETE CBC W/AUTO DIFF WBC: CPT | Performed by: FAMILY MEDICINE

## 2023-09-19 RX ORDER — TRIAMCINOLONE ACETONIDE 1 MG/G
OINTMENT TOPICAL 2 TIMES DAILY
Qty: 80 G | Refills: 11 | Status: SHIPPED | OUTPATIENT
Start: 2023-09-19

## 2023-09-19 RX ORDER — AMLODIPINE BESYLATE 10 MG/1
10 TABLET ORAL DAILY
Qty: 90 TABLET | Refills: 1 | Status: SHIPPED | OUTPATIENT
Start: 2023-09-19

## 2023-09-19 RX ORDER — SILDENAFIL 100 MG/1
100 TABLET, FILM COATED ORAL PRN
Qty: 30 TABLET | Refills: 5 | Status: SHIPPED | OUTPATIENT
Start: 2023-09-19

## 2023-09-19 RX ORDER — CLOTRIMAZOLE AND BETAMETHASONE DIPROPIONATE 10; .64 MG/G; MG/G
CREAM TOPICAL
Qty: 45 G | Refills: 5 | Status: SHIPPED | OUTPATIENT
Start: 2023-09-19

## 2023-09-19 RX ORDER — OLMESARTAN MEDOXOMIL AND HYDROCHLOROTHIAZIDE 40/25 40; 25 MG/1; MG/1
1 TABLET ORAL DAILY
Qty: 90 TABLET | Refills: 1 | Status: SHIPPED | OUTPATIENT
Start: 2023-09-19

## 2023-09-19 RX ORDER — ATORVASTATIN CALCIUM 20 MG/1
20 TABLET, FILM COATED ORAL DAILY
Qty: 30 TABLET | Refills: 2 | Status: SHIPPED | OUTPATIENT
Start: 2023-09-19 | End: 2023-12-18

## 2023-09-19 RX ORDER — METOPROLOL SUCCINATE 100 MG/1
100 TABLET, EXTENDED RELEASE ORAL DAILY
Qty: 90 TABLET | Refills: 1 | Status: SHIPPED | OUTPATIENT
Start: 2023-09-19

## 2023-09-19 SDOH — ECONOMIC STABILITY: TRANSPORTATION INSECURITY
IN THE PAST 12 MONTHS, HAS LACK OF TRANSPORTATION KEPT YOU FROM MEETINGS, WORK, OR FROM GETTING THINGS NEEDED FOR DAILY LIVING?: NO

## 2023-09-19 SDOH — ECONOMIC STABILITY: FOOD INSECURITY: WITHIN THE PAST 12 MONTHS, YOU WORRIED THAT YOUR FOOD WOULD RUN OUT BEFORE YOU GOT MONEY TO BUY MORE.: NEVER TRUE

## 2023-09-19 SDOH — ECONOMIC STABILITY: HOUSING INSECURITY
IN THE LAST 12 MONTHS, WAS THERE A TIME WHEN YOU DID NOT HAVE A STEADY PLACE TO SLEEP OR SLEPT IN A SHELTER (INCLUDING NOW)?: NO

## 2023-09-19 SDOH — ECONOMIC STABILITY: FOOD INSECURITY: WITHIN THE PAST 12 MONTHS, THE FOOD YOU BOUGHT JUST DIDN'T LAST AND YOU DIDN'T HAVE MONEY TO GET MORE.: NEVER TRUE

## 2023-09-19 SDOH — ECONOMIC STABILITY: INCOME INSECURITY: HOW HARD IS IT FOR YOU TO PAY FOR THE VERY BASICS LIKE FOOD, HOUSING, MEDICAL CARE, AND HEATING?: SOMEWHAT HARD

## 2023-09-19 ASSESSMENT — PATIENT HEALTH QUESTIONNAIRE - PHQ9
SUM OF ALL RESPONSES TO PHQ9 QUESTIONS 1 & 2: 0
SUM OF ALL RESPONSES TO PHQ9 QUESTIONS 1 & 2: 0
SUM OF ALL RESPONSES TO PHQ QUESTIONS 1-9: 0
2. FEELING DOWN, DEPRESSED OR HOPELESS: NOT AT ALL
1. LITTLE INTEREST OR PLEASURE IN DOING THINGS: 0
1. LITTLE INTEREST OR PLEASURE IN DOING THINGS: NOT AT ALL
SUM OF ALL RESPONSES TO PHQ QUESTIONS 1-9: 0
2. FEELING DOWN, DEPRESSED OR HOPELESS: 0
SUM OF ALL RESPONSES TO PHQ QUESTIONS 1-9: 0
SUM OF ALL RESPONSES TO PHQ QUESTIONS 1-9: 0

## 2023-09-19 ASSESSMENT — ANXIETY QUESTIONNAIRES
GAD7 TOTAL SCORE: 0
6. BECOMING EASILY ANNOYED OR IRRITABLE: 0
IF YOU CHECKED OFF ANY PROBLEMS ON THIS QUESTIONNAIRE, HOW DIFFICULT HAVE THESE PROBLEMS MADE IT FOR YOU TO DO YOUR WORK, TAKE CARE OF THINGS AT HOME, OR GET ALONG WITH OTHER PEOPLE: NOT DIFFICULT AT ALL
3. WORRYING TOO MUCH ABOUT DIFFERENT THINGS: 0
2. NOT BEING ABLE TO STOP OR CONTROL WORRYING: 0
7. FEELING AFRAID AS IF SOMETHING AWFUL MIGHT HAPPEN: 0
5. BEING SO RESTLESS THAT IT IS HARD TO SIT STILL: 0
4. TROUBLE RELAXING: 0
1. FEELING NERVOUS, ANXIOUS, OR ON EDGE: 0

## 2023-09-19 ASSESSMENT — ENCOUNTER SYMPTOMS
NAUSEA: 0
SHORTNESS OF BREATH: 0
VOMITING: 0

## 2023-09-19 NOTE — PROGRESS NOTES
PROGRESS NOTE    SUBJECTIVE:   Jessika Suh is a 57 y.o. male seen for a follow up visit regarding the following chief complaint:     Chief Complaint   Patient presents with    Blood Pressure Check           HPI presents the office states he has not been here in over 10 years and they told him to come into the office also complaining of needing to have a physical      Past Medical History, Past Surgical History, Family history, Social History, and Medications were all reviewed with the patient today and updated as necessary. Current Outpatient Medications   Medication Sig Dispense Refill    amLODIPine (NORVASC) 10 MG tablet Take 1 tablet by mouth daily TAKE 1 TABLET BY MOUTH EVERY DAY 90 tablet 1    apixaban (ELIQUIS) 5 MG TABS tablet Take 1 tablet by mouth 2 times daily TAKE 1 TABLET BY MOUTH TWICE A  tablet 1    atorvastatin (LIPITOR) 20 MG tablet Take 1 tablet by mouth daily 30 tablet 2    clotrimazole-betamethasone (LOTRISONE) 1-0.05 % cream Applied to affected area twice daily 45 gm tube 45 g 5    metoprolol succinate (TOPROL XL) 100 MG extended release tablet Take 1 tablet by mouth daily TAKE 1 TABLET BY MOUTH EVERY DAY 90 tablet 1    olmesartan-hydroCHLOROthiazide (BENICAR HCT) 40-25 MG per tablet Take 1 tablet by mouth daily TAKE 1 TABLET BY MOUTH EVERY DAY 90 tablet 1    sildenafil (VIAGRA) 100 MG tablet Take 1 tablet by mouth as needed for Erectile Dysfunction 30 tablet 5    triamcinolone (KENALOG) 0.1 % ointment Apply topically 2 times daily 80 g 11     No current facility-administered medications for this visit.      No Known Allergies  Patient Active Problem List   Diagnosis    Tobacco abuse    CKD (chronic kidney disease)    Atrial fibrillation (HCC)    Cellulitis of foot    Obesity, unspecified    CKD (chronic kidney disease) stage 3, GFR 30-59 ml/min (HCC)    Atrial fibrillation with RVR (HCC)    HLD (hyperlipidemia)    Hypertension     Past Medical History:   Diagnosis Date    Atrial

## 2023-10-09 ENCOUNTER — HOSPITAL ENCOUNTER (OUTPATIENT)
Age: 63
Setting detail: OBSERVATION
Discharge: HOME | End: 2023-10-11
Attending: EMERGENCY MEDICINE | Admitting: INTERNAL MEDICINE
Payer: COMMERCIAL

## 2023-10-09 ENCOUNTER — APPOINTMENT (OUTPATIENT)
Dept: GENERAL RADIOLOGY | Age: 63
End: 2023-10-09
Payer: COMMERCIAL

## 2023-10-09 DIAGNOSIS — I48.91 ATRIAL FIBRILLATION WITH RAPID VENTRICULAR RESPONSE (HCC): Primary | ICD-10-CM

## 2023-10-09 DIAGNOSIS — I48.91 A-FIB (HCC): ICD-10-CM

## 2023-10-09 DIAGNOSIS — E83.42 HYPOMAGNESEMIA: ICD-10-CM

## 2023-10-09 DIAGNOSIS — M25.511 RIGHT SHOULDER PAIN, UNSPECIFIED CHRONICITY: ICD-10-CM

## 2023-10-09 DIAGNOSIS — E87.6 HYPOKALEMIA: ICD-10-CM

## 2023-10-09 PROBLEM — Z72.0 TOBACCO ABUSE: Status: ACTIVE | Noted: 2017-09-20

## 2023-10-09 PROBLEM — N18.30 CKD (CHRONIC KIDNEY DISEASE) STAGE 3, GFR 30-59 ML/MIN (HCC): Status: ACTIVE | Noted: 2020-11-16

## 2023-10-09 PROBLEM — I10 HYPERTENSION: Status: ACTIVE | Noted: 2017-09-14

## 2023-10-09 PROBLEM — E78.5 HLD (HYPERLIPIDEMIA): Status: ACTIVE | Noted: 2022-03-22

## 2023-10-09 PROBLEM — E66.9 OBESITY, UNSPECIFIED: Status: ACTIVE | Noted: 2017-09-20

## 2023-10-09 PROBLEM — N18.9 CKD (CHRONIC KIDNEY DISEASE): Status: ACTIVE | Noted: 2018-09-05

## 2023-10-09 LAB
ANION GAP SERPL CALC-SCNC: 7 MMOL/L (ref 2–11)
BASOPHILS # BLD: 0.1 K/UL (ref 0–0.2)
BASOPHILS NFR BLD: 1 % (ref 0–2)
BUN SERPL-MCNC: 30 MG/DL (ref 8–23)
CALCIUM SERPL-MCNC: 9 MG/DL (ref 8.3–10.4)
CHLORIDE SERPL-SCNC: 108 MMOL/L (ref 101–110)
CO2 SERPL-SCNC: 28 MMOL/L (ref 21–32)
CREAT SERPL-MCNC: 2.2 MG/DL (ref 0.8–1.5)
DIFFERENTIAL METHOD BLD: ABNORMAL
EOSINOPHIL # BLD: 0.2 K/UL (ref 0–0.8)
EOSINOPHIL NFR BLD: 2 % (ref 0.5–7.8)
ERYTHROCYTE [DISTWIDTH] IN BLOOD BY AUTOMATED COUNT: 13.2 % (ref 11.9–14.6)
GLUCOSE SERPL-MCNC: 158 MG/DL (ref 65–100)
HCT VFR BLD AUTO: 48.4 % (ref 41.1–50.3)
HGB BLD-MCNC: 16.4 G/DL (ref 13.6–17.2)
IMM GRANULOCYTES # BLD AUTO: 0.1 K/UL (ref 0–0.5)
IMM GRANULOCYTES NFR BLD AUTO: 0 % (ref 0–5)
LYMPHOCYTES # BLD: 3.5 K/UL (ref 0.5–4.6)
LYMPHOCYTES NFR BLD: 30 % (ref 13–44)
MAGNESIUM SERPL-MCNC: 1.7 MG/DL (ref 1.8–2.4)
MCH RBC QN AUTO: 28.5 PG (ref 26.1–32.9)
MCHC RBC AUTO-ENTMCNC: 33.9 G/DL (ref 31.4–35)
MCV RBC AUTO: 84.2 FL (ref 82–102)
MONOCYTES # BLD: 1 K/UL (ref 0.1–1.3)
MONOCYTES NFR BLD: 9 % (ref 4–12)
NEUTS SEG # BLD: 6.9 K/UL (ref 1.7–8.2)
NEUTS SEG NFR BLD: 58 % (ref 43–78)
NRBC # BLD: 0 K/UL (ref 0–0.2)
PLATELET # BLD AUTO: 379 K/UL (ref 150–450)
PMV BLD AUTO: 9.1 FL (ref 9.4–12.3)
POTASSIUM SERPL-SCNC: 3.2 MMOL/L (ref 3.5–5.1)
RBC # BLD AUTO: 5.75 M/UL (ref 4.23–5.6)
SODIUM SERPL-SCNC: 143 MMOL/L (ref 133–143)
TROPONIN I SERPL HS-MCNC: 9.3 PG/ML (ref 0–14)
TROPONIN I SERPL HS-MCNC: 9.8 PG/ML (ref 0–14)
WBC # BLD AUTO: 11.7 K/UL (ref 4.3–11.1)

## 2023-10-09 PROCEDURE — 99285 EMERGENCY DEPT VISIT HI MDM: CPT

## 2023-10-09 PROCEDURE — 6370000000 HC RX 637 (ALT 250 FOR IP): Performed by: PHYSICIAN ASSISTANT

## 2023-10-09 PROCEDURE — 96365 THER/PROPH/DIAG IV INF INIT: CPT

## 2023-10-09 PROCEDURE — 2580000003 HC RX 258: Performed by: EMERGENCY MEDICINE

## 2023-10-09 PROCEDURE — 94762 N-INVAS EAR/PLS OXIMTRY CONT: CPT

## 2023-10-09 PROCEDURE — 96376 TX/PRO/DX INJ SAME DRUG ADON: CPT

## 2023-10-09 PROCEDURE — 84484 ASSAY OF TROPONIN QUANT: CPT

## 2023-10-09 PROCEDURE — 2500000003 HC RX 250 WO HCPCS: Performed by: EMERGENCY MEDICINE

## 2023-10-09 PROCEDURE — 83735 ASSAY OF MAGNESIUM: CPT

## 2023-10-09 PROCEDURE — 96366 THER/PROPH/DIAG IV INF ADDON: CPT

## 2023-10-09 PROCEDURE — 99203 OFFICE O/P NEW LOW 30 MIN: CPT | Performed by: INTERNAL MEDICINE

## 2023-10-09 PROCEDURE — 6360000002 HC RX W HCPCS: Performed by: EMERGENCY MEDICINE

## 2023-10-09 PROCEDURE — G0378 HOSPITAL OBSERVATION PER HR: HCPCS

## 2023-10-09 PROCEDURE — 71045 X-RAY EXAM CHEST 1 VIEW: CPT

## 2023-10-09 PROCEDURE — 96368 THER/DIAG CONCURRENT INF: CPT

## 2023-10-09 PROCEDURE — 85025 COMPLETE CBC W/AUTO DIFF WBC: CPT

## 2023-10-09 PROCEDURE — 6360000002 HC RX W HCPCS: Performed by: INTERNAL MEDICINE

## 2023-10-09 PROCEDURE — 2580000003 HC RX 258: Performed by: PHYSICIAN ASSISTANT

## 2023-10-09 PROCEDURE — 93005 ELECTROCARDIOGRAM TRACING: CPT | Performed by: EMERGENCY MEDICINE

## 2023-10-09 PROCEDURE — 80048 BASIC METABOLIC PNL TOTAL CA: CPT

## 2023-10-09 RX ORDER — MAGNESIUM SULFATE IN WATER 40 MG/ML
2000 INJECTION, SOLUTION INTRAVENOUS ONCE
Status: COMPLETED | OUTPATIENT
Start: 2023-10-09 | End: 2023-10-09

## 2023-10-09 RX ORDER — POLYETHYLENE GLYCOL 3350 17 G/17G
17 POWDER, FOR SOLUTION ORAL DAILY PRN
Status: DISCONTINUED | OUTPATIENT
Start: 2023-10-09 | End: 2023-10-11 | Stop reason: HOSPADM

## 2023-10-09 RX ORDER — POTASSIUM CHLORIDE 7.45 MG/ML
10 INJECTION INTRAVENOUS
Status: COMPLETED | OUTPATIENT
Start: 2023-10-09 | End: 2023-10-09

## 2023-10-09 RX ORDER — ONDANSETRON 4 MG/1
4 TABLET, ORALLY DISINTEGRATING ORAL EVERY 8 HOURS PRN
Status: DISCONTINUED | OUTPATIENT
Start: 2023-10-09 | End: 2023-10-11 | Stop reason: HOSPADM

## 2023-10-09 RX ORDER — ATORVASTATIN CALCIUM 20 MG/1
20 TABLET, FILM COATED ORAL DAILY
Status: DISCONTINUED | OUTPATIENT
Start: 2023-10-09 | End: 2023-10-11 | Stop reason: HOSPADM

## 2023-10-09 RX ORDER — HYDROCODONE BITARTRATE AND ACETAMINOPHEN 5; 325 MG/1; MG/1
1 TABLET ORAL EVERY 6 HOURS PRN
Status: DISCONTINUED | OUTPATIENT
Start: 2023-10-09 | End: 2023-10-11 | Stop reason: HOSPADM

## 2023-10-09 RX ORDER — SODIUM CHLORIDE 0.9 % (FLUSH) 0.9 %
5-40 SYRINGE (ML) INJECTION EVERY 12 HOURS SCHEDULED
Status: DISCONTINUED | OUTPATIENT
Start: 2023-10-09 | End: 2023-10-11 | Stop reason: HOSPADM

## 2023-10-09 RX ORDER — CLOTRIMAZOLE AND BETAMETHASONE DIPROPIONATE 10; .64 MG/G; MG/G
CREAM TOPICAL 2 TIMES DAILY
Status: DISCONTINUED | OUTPATIENT
Start: 2023-10-09 | End: 2023-10-11 | Stop reason: HOSPADM

## 2023-10-09 RX ORDER — ACETAMINOPHEN 325 MG/1
650 TABLET ORAL EVERY 6 HOURS PRN
Status: DISCONTINUED | OUTPATIENT
Start: 2023-10-09 | End: 2023-10-11 | Stop reason: HOSPADM

## 2023-10-09 RX ORDER — SODIUM CHLORIDE 9 MG/ML
INJECTION, SOLUTION INTRAVENOUS PRN
Status: DISCONTINUED | OUTPATIENT
Start: 2023-10-09 | End: 2023-10-11 | Stop reason: HOSPADM

## 2023-10-09 RX ORDER — POTASSIUM CHLORIDE 7.45 MG/ML
10 INJECTION INTRAVENOUS
Status: DISPENSED | OUTPATIENT
Start: 2023-10-09 | End: 2023-10-09

## 2023-10-09 RX ORDER — DILTIAZEM HYDROCHLORIDE 5 MG/ML
20 INJECTION INTRAVENOUS ONCE
Status: COMPLETED | OUTPATIENT
Start: 2023-10-09 | End: 2023-10-09

## 2023-10-09 RX ORDER — ONDANSETRON 2 MG/ML
4 INJECTION INTRAMUSCULAR; INTRAVENOUS EVERY 6 HOURS PRN
Status: DISCONTINUED | OUTPATIENT
Start: 2023-10-09 | End: 2023-10-11 | Stop reason: HOSPADM

## 2023-10-09 RX ORDER — SODIUM CHLORIDE 0.9 % (FLUSH) 0.9 %
5-40 SYRINGE (ML) INJECTION PRN
Status: DISCONTINUED | OUTPATIENT
Start: 2023-10-09 | End: 2023-10-11 | Stop reason: HOSPADM

## 2023-10-09 RX ORDER — METOPROLOL SUCCINATE 100 MG/1
100 TABLET, EXTENDED RELEASE ORAL DAILY
Status: DISCONTINUED | OUTPATIENT
Start: 2023-10-10 | End: 2023-10-11 | Stop reason: HOSPADM

## 2023-10-09 RX ADMIN — POTASSIUM CHLORIDE 10 MEQ: 7.46 INJECTION, SOLUTION INTRAVENOUS at 21:27

## 2023-10-09 RX ADMIN — HYDROCODONE BITARTRATE AND ACETAMINOPHEN 1 TABLET: 5; 325 TABLET ORAL at 20:04

## 2023-10-09 RX ADMIN — SODIUM CHLORIDE 12.5 MG/HR: 900 INJECTION, SOLUTION INTRAVENOUS at 21:26

## 2023-10-09 RX ADMIN — DILTIAZEM HYDROCHLORIDE 20 MG: 5 INJECTION INTRAVENOUS at 15:10

## 2023-10-09 RX ADMIN — POTASSIUM CHLORIDE 10 MEQ: 7.46 INJECTION, SOLUTION INTRAVENOUS at 20:11

## 2023-10-09 RX ADMIN — POTASSIUM CHLORIDE 10 MEQ: 7.46 INJECTION, SOLUTION INTRAVENOUS at 17:52

## 2023-10-09 RX ADMIN — SODIUM CHLORIDE, PRESERVATIVE FREE 10 ML: 5 INJECTION INTRAVENOUS at 20:03

## 2023-10-09 RX ADMIN — SODIUM CHLORIDE 5 MG/HR: 900 INJECTION, SOLUTION INTRAVENOUS at 15:10

## 2023-10-09 RX ADMIN — APIXABAN 5 MG: 5 TABLET, FILM COATED ORAL at 20:04

## 2023-10-09 RX ADMIN — MAGNESIUM SULFATE HEPTAHYDRATE 2000 MG: 40 INJECTION, SOLUTION INTRAVENOUS at 17:51

## 2023-10-09 ASSESSMENT — PAIN - FUNCTIONAL ASSESSMENT: PAIN_FUNCTIONAL_ASSESSMENT: 0-10

## 2023-10-09 ASSESSMENT — PAIN DESCRIPTION - ORIENTATION: ORIENTATION: RIGHT

## 2023-10-09 ASSESSMENT — PAIN SCALES - GENERAL: PAINLEVEL_OUTOF10: 0

## 2023-10-09 NOTE — ED TRIAGE NOTES
Pt ambulatory to triage with CO SOB and high HR. Hx AF, has not been in AF x 2 years. Reports feeling like hes been in AF since yesterday.  Compliant with eliquis

## 2023-10-09 NOTE — ED NOTES
Assumed care of pt at this time. Pt currently in Afib RVR and reporting SOB. Pt reports a HX of Afib. Pt denies C.P, N/V, diaphoresis. Pt A&O 4/4, GCS 15, speech is clear. Pt attached to cardiac monitor with alarms set.      Luana Henderson, ARTURO  10/09/23 5562

## 2023-10-09 NOTE — ED PROVIDER NOTES
Emergency Department Provider Note                   PCP:                Angelito Hewitt DO               Age: 57 y.o. Sex: male       ICD-10-CM    1. Atrial fibrillation with rapid ventricular response (HCC)  I48.91       2. Hypomagnesemia  E83.42       3. Hypokalemia  E87.6           DISPOSITION Admitted 10/09/2023 05:56:37 PM        MDM  Number of Diagnoses or Management Options  Atrial fibrillation with rapid ventricular response (HCC)  Hypokalemia  Hypomagnesemia  Diagnosis management comments: MEDICAL DECISION MAKING  Complexity of Problems Addressed:  1 or more chronic illnesses with a severe exacerbation or progression. Data Reviewed and Analyzed:  Category 1:   I independently ordered and reviewed each unique test.    Category 2:   I independently ordered and interpreted the ED EKG in the absence of a Cardiologist.    Rate: 141  EKG Interpretation: EKG Interpretation: atrial fibrillation, rapid ventricular response  ST Segments: Nonspecific ST segments - NO STEMI  I interpreted the X-rays No acute findings. Category 3: Discussion of management or test interpretation. The patient presents with palpitations and SOB. Noted to be in Afib with RVR on arrival. Diltiazem bolus and infusion ordered. Rate did improve with titration of infusion. Both the patient's magnesium and potassium are low. IV replacement for his electrolytes was ordered. Reviewed old records and the patient's last cardioversion was in March 2022. Cardiology is consulted and the patient will be admitted to their service. The patient was admitted and I have discussed patient management with the admitting provider. Risk of Complications and/or Morbidity of Patient Management:  Drug therapy given requiring intensive monitoring for toxicity. Risk of Complications, Morbidity, and/or Mortality  General comments: Critical care time: 35 minutes of critical care time was performed in the emergency department.  This was

## 2023-10-10 ENCOUNTER — APPOINTMENT (OUTPATIENT)
Dept: CARDIAC CATH/INVASIVE PROCEDURES | Age: 63
End: 2023-10-10
Attending: INTERNAL MEDICINE
Payer: COMMERCIAL

## 2023-10-10 ENCOUNTER — TELEPHONE (OUTPATIENT)
Age: 63
End: 2023-10-10

## 2023-10-10 DIAGNOSIS — I48.91 ATRIAL FIBRILLATION WITH RAPID VENTRICULAR RESPONSE (HCC): Primary | ICD-10-CM

## 2023-10-10 LAB
ANION GAP SERPL CALC-SCNC: 7 MMOL/L (ref 2–11)
BUN SERPL-MCNC: 27 MG/DL (ref 8–23)
CALCIUM SERPL-MCNC: 9.2 MG/DL (ref 8.3–10.4)
CHLORIDE SERPL-SCNC: 108 MMOL/L (ref 101–110)
CO2 SERPL-SCNC: 23 MMOL/L (ref 21–32)
CREAT SERPL-MCNC: 2 MG/DL (ref 0.8–1.5)
ECHO BSA: 2.41 M2
EKG DIAGNOSIS: NORMAL
EKG Q-T INTERVAL: 324 MS
EKG QRS DURATION: 112 MS
EKG QTC CALCULATION (BAZETT): 496 MS
EKG R AXIS: 46 DEGREES
EKG T AXIS: 220 DEGREES
EKG VENTRICULAR RATE: 141 BPM
ERYTHROCYTE [DISTWIDTH] IN BLOOD BY AUTOMATED COUNT: 13.4 % (ref 11.9–14.6)
GLUCOSE SERPL-MCNC: 127 MG/DL (ref 65–100)
HCT VFR BLD AUTO: 49.3 % (ref 41.1–50.3)
HGB BLD-MCNC: 16 G/DL (ref 13.6–17.2)
MAGNESIUM SERPL-MCNC: 1.9 MG/DL (ref 1.8–2.4)
MCH RBC QN AUTO: 28.3 PG (ref 26.1–32.9)
MCHC RBC AUTO-ENTMCNC: 32.5 G/DL (ref 31.4–35)
MCV RBC AUTO: 87.1 FL (ref 82–102)
NRBC # BLD: 0 K/UL (ref 0–0.2)
PLATELET # BLD AUTO: 329 K/UL (ref 150–450)
PMV BLD AUTO: 9.1 FL (ref 9.4–12.3)
POTASSIUM SERPL-SCNC: 3.4 MMOL/L (ref 3.5–5.1)
RBC # BLD AUTO: 5.66 M/UL (ref 4.23–5.6)
SODIUM SERPL-SCNC: 138 MMOL/L (ref 133–143)
WBC # BLD AUTO: 11.2 K/UL (ref 4.3–11.1)

## 2023-10-10 PROCEDURE — 6360000002 HC RX W HCPCS: Performed by: INTERNAL MEDICINE

## 2023-10-10 PROCEDURE — 93325 DOPPLER ECHO COLOR FLOW MAPG: CPT | Performed by: INTERNAL MEDICINE

## 2023-10-10 PROCEDURE — 92960 CARDIOVERSION ELECTRIC EXT: CPT | Performed by: INTERNAL MEDICINE

## 2023-10-10 PROCEDURE — 93325 DOPPLER ECHO COLOR FLOW MAPG: CPT

## 2023-10-10 PROCEDURE — 93312 ECHO TRANSESOPHAGEAL: CPT | Performed by: INTERNAL MEDICINE

## 2023-10-10 PROCEDURE — 6370000000 HC RX 637 (ALT 250 FOR IP): Performed by: INTERNAL MEDICINE

## 2023-10-10 PROCEDURE — 85027 COMPLETE CBC AUTOMATED: CPT

## 2023-10-10 PROCEDURE — 99152 MOD SED SAME PHYS/QHP 5/>YRS: CPT | Performed by: INTERNAL MEDICINE

## 2023-10-10 PROCEDURE — 80048 BASIC METABOLIC PNL TOTAL CA: CPT

## 2023-10-10 PROCEDURE — 83735 ASSAY OF MAGNESIUM: CPT

## 2023-10-10 PROCEDURE — G0378 HOSPITAL OBSERVATION PER HR: HCPCS

## 2023-10-10 PROCEDURE — 6370000000 HC RX 637 (ALT 250 FOR IP): Performed by: PHYSICIAN ASSISTANT

## 2023-10-10 PROCEDURE — 99213 OFFICE O/P EST LOW 20 MIN: CPT | Performed by: INTERNAL MEDICINE

## 2023-10-10 PROCEDURE — 99152 MOD SED SAME PHYS/QHP 5/>YRS: CPT

## 2023-10-10 PROCEDURE — 93005 ELECTROCARDIOGRAM TRACING: CPT | Performed by: INTERNAL MEDICINE

## 2023-10-10 PROCEDURE — 2580000003 HC RX 258: Performed by: PHYSICIAN ASSISTANT

## 2023-10-10 PROCEDURE — 96366 THER/PROPH/DIAG IV INF ADDON: CPT

## 2023-10-10 PROCEDURE — 36415 COLL VENOUS BLD VENIPUNCTURE: CPT

## 2023-10-10 PROCEDURE — 93010 ELECTROCARDIOGRAM REPORT: CPT | Performed by: INTERNAL MEDICINE

## 2023-10-10 PROCEDURE — 96375 TX/PRO/DX INJ NEW DRUG ADDON: CPT

## 2023-10-10 PROCEDURE — 2580000003 HC RX 258: Performed by: EMERGENCY MEDICINE

## 2023-10-10 PROCEDURE — 93320 DOPPLER ECHO COMPLETE: CPT | Performed by: INTERNAL MEDICINE

## 2023-10-10 PROCEDURE — 2500000003 HC RX 250 WO HCPCS: Performed by: EMERGENCY MEDICINE

## 2023-10-10 RX ORDER — MIDAZOLAM HYDROCHLORIDE 1 MG/ML
INJECTION INTRAMUSCULAR; INTRAVENOUS PRN
Status: COMPLETED | OUTPATIENT
Start: 2023-10-10 | End: 2023-10-10

## 2023-10-10 RX ORDER — LIDOCAINE HYDROCHLORIDE 20 MG/ML
SOLUTION OROPHARYNGEAL PRN
Status: COMPLETED | OUTPATIENT
Start: 2023-10-10 | End: 2023-10-10

## 2023-10-10 RX ORDER — LOSARTAN POTASSIUM 50 MG/1
50 TABLET ORAL DAILY
Status: DISCONTINUED | OUTPATIENT
Start: 2023-10-10 | End: 2023-10-11 | Stop reason: HOSPADM

## 2023-10-10 RX ORDER — POTASSIUM CHLORIDE 20 MEQ/1
40 TABLET, EXTENDED RELEASE ORAL ONCE
Status: COMPLETED | OUTPATIENT
Start: 2023-10-10 | End: 2023-10-10

## 2023-10-10 RX ADMIN — FENTANYL CITRATE 25 MCG: 50 INJECTION INTRAMUSCULAR; INTRAVENOUS at 15:53

## 2023-10-10 RX ADMIN — SODIUM CHLORIDE 5 MG/HR: 900 INJECTION, SOLUTION INTRAVENOUS at 06:07

## 2023-10-10 RX ADMIN — APIXABAN 5 MG: 5 TABLET, FILM COATED ORAL at 07:40

## 2023-10-10 RX ADMIN — LOSARTAN POTASSIUM 50 MG: 50 TABLET, FILM COATED ORAL at 07:40

## 2023-10-10 RX ADMIN — MIDAZOLAM 2 MG: 1 INJECTION INTRAMUSCULAR; INTRAVENOUS at 15:52

## 2023-10-10 RX ADMIN — HYDROCODONE BITARTRATE AND ACETAMINOPHEN 1 TABLET: 5; 325 TABLET ORAL at 19:02

## 2023-10-10 RX ADMIN — MIDAZOLAM 1 MG: 1 INJECTION INTRAMUSCULAR; INTRAVENOUS at 15:53

## 2023-10-10 RX ADMIN — ACETAMINOPHEN 650 MG: 325 TABLET ORAL at 00:20

## 2023-10-10 RX ADMIN — LIDOCAINE HYDROCHLORIDE 15 ML: 20 SOLUTION ORAL at 15:46

## 2023-10-10 RX ADMIN — FENTANYL CITRATE 50 MCG: 50 INJECTION INTRAMUSCULAR; INTRAVENOUS at 15:51

## 2023-10-10 RX ADMIN — SODIUM CHLORIDE, PRESERVATIVE FREE 10 ML: 5 INJECTION INTRAVENOUS at 20:55

## 2023-10-10 RX ADMIN — ATORVASTATIN CALCIUM 20 MG: 20 TABLET, FILM COATED ORAL at 07:40

## 2023-10-10 RX ADMIN — MIDAZOLAM 2 MG: 1 INJECTION INTRAMUSCULAR; INTRAVENOUS at 15:51

## 2023-10-10 RX ADMIN — SODIUM CHLORIDE, PRESERVATIVE FREE 10 ML: 5 INJECTION INTRAVENOUS at 07:41

## 2023-10-10 RX ADMIN — APIXABAN 5 MG: 5 TABLET, FILM COATED ORAL at 20:55

## 2023-10-10 RX ADMIN — ATORVASTATIN CALCIUM 20 MG: 20 TABLET, FILM COATED ORAL at 09:49

## 2023-10-10 RX ADMIN — METOPROLOL SUCCINATE 100 MG: 100 TABLET, EXTENDED RELEASE ORAL at 07:40

## 2023-10-10 RX ADMIN — HYDROCODONE BITARTRATE AND ACETAMINOPHEN 1 TABLET: 5; 325 TABLET ORAL at 07:43

## 2023-10-10 RX ADMIN — FENTANYL CITRATE 25 MCG: 50 INJECTION INTRAMUSCULAR; INTRAVENOUS at 15:58

## 2023-10-10 RX ADMIN — POTASSIUM CHLORIDE 40 MEQ: 1500 TABLET, EXTENDED RELEASE ORAL at 07:40

## 2023-10-10 ASSESSMENT — PAIN SCALES - GENERAL
PAINLEVEL_OUTOF10: 0
PAINLEVEL_OUTOF10: 5
PAINLEVEL_OUTOF10: 0
PAINLEVEL_OUTOF10: 5
PAINLEVEL_OUTOF10: 4

## 2023-10-10 ASSESSMENT — PAIN DESCRIPTION - ORIENTATION
ORIENTATION: LEFT
ORIENTATION: RIGHT

## 2023-10-10 ASSESSMENT — PAIN - FUNCTIONAL ASSESSMENT
PAIN_FUNCTIONAL_ASSESSMENT: ADULT NONVERBAL PAIN SCALE (NPVS)
PAIN_FUNCTIONAL_ASSESSMENT: NONE - DENIES PAIN

## 2023-10-10 ASSESSMENT — PAIN DESCRIPTION - LOCATION
LOCATION: SHOULDER
LOCATION: ARM

## 2023-10-10 NOTE — TELEPHONE ENCOUNTER
----- Message from Sharron Etelvina sent at 10/10/2023  9:53 AM EDT -----  Please put in referral for patient for EP Consult for afib with RVR per Dr. Ricky Spears. Thank you.

## 2023-10-10 NOTE — PROGRESS NOTES
TRANSFER - OUT REPORT:    Verbal report given to RN on Shelby Martin  being transferred to [facility] for routine progression of patient care       Report consisted of patient's Situation, Background, Assessment and   Recommendations(SBAR). Information from the following report(s) Nurse Handoff Report and MAR was reviewed with the receiving nurse.       LORENA/CVN w/ Dr. Andres Zhu at 1545  Successful cardioversion, pt now NSR at 69    Versed 5mg IV  Fentanyl 100mcg IV

## 2023-10-10 NOTE — PROGRESS NOTES
TRANSFER - IN REPORT:    Verbal report received from [facility] on Marva Stevens  being received from [facility] for routine progression of patient care      Report consisted of patient's Situation, Background, Assessment and   Recommendations(SBAR). Information from the following report(s) Nurse Handoff Report, MAR, and Cardiac Rhythm NSR  was reviewed with the receiving nurse. Opportunity for questions and clarification was provided. Assessment completed upon patient's arrival to unit and care assumed.

## 2023-10-10 NOTE — CARE COORDINATION
Pt presented to [facility] ED on 10/9/23 c/o SOB and elevated heart rate. While in the ED, he was found to be in A. Fib RVR. Pt reported he forgets to take his Eliquis often, but can afford the medication. Medical hx includes: pA.Fib, HTN, HLD, CKD III, morbid obesity and nicotine use. Pts son at bedside. PTA, pt lives in [place], however, works in [employer]. 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 Indep with all his ADLs. Pt and his son live together. Is on RA. Denies DME need. PCP established. [employer] verified and able to afford his home meds. No discharge needs identified, will remain available. 10/10/23 1331   Service Assessment   Patient Orientation Alert and Oriented   Cognition Alert   History Provided By Patient; Child/Family   Primary Caregiver Self   Support Systems Children;Family Members;Friends/Neighbors   PCP Verified by CM Yes  Muna Yepez)   Prior Functional Level Independent in ADLs/IADLs   Current Functional Level Independent in ADLs/IADLs   Can patient return to prior living arrangement Yes   Ability to make needs known: Good   Family able to assist with home care needs: Yes   Would you like for me to discuss the discharge plan with any other family members/significant others, and if so, who?  No   Financial Resources Other (Comment)  ([employer])   Community Resources None   Social/Functional History   Lives With Son   Type of [address] [place] [address] Dr One level   [address] [facility] Help From [address] [facility]   Ambulation Assistance Independent   Transfer Assistance Independent   Active  Yes   Mode of Transportation Car   Occupation Full time employment   Type of Occupation Tool &    Discharge Planning   Type of [address] [employer] Prior To Admission None   Potential Assistance Needed N/A   DME

## 2023-10-10 NOTE — PROGRESS NOTES
Pt. On cardizem gtt for afib rvr. Notified by MT that pt. HR baldemar to 45. Cardizem gtt stopped and Paulo Martinezn, NP notfied. Instructed to restart Cardizem gtt back at 5mg if pt. Asymptomatic.

## 2023-10-10 NOTE — PROGRESS NOTES
TRANSFER - OUT REPORT:    Verbal report given to [address] on Genoveva Hernandez  being transferred to  for routine progression of patient care       Report consisted of patient's Situation, Background, Assessment and   Recommendations(SBAR). Information from the following report(s) Nurse Handoff Report, MAR, and Cardiac Rhythm NSR  was reviewed with the receiving nurse. Lines:   Peripheral IV 10/09/23 Right Antecubital (Active)   Site Assessment Clean, dry & intact 10/10/23 1200   Line Status Normal saline locked 10/10/23 1200   Line Care Connections checked and tightened 10/10/23 1200   Phlebitis Assessment No symptoms 10/10/23 1200   Infiltration Assessment 0 10/10/23 1200   Alcohol Cap Used Yes 10/10/23 1200   Dressing Status Clean, dry & intact 10/10/23 1200   Dressing Type Transparent 10/10/23 1200       Peripheral IV 10/09/23 Left Antecubital (Active)   Site Assessment Clean, dry & intact 10/10/23 1200   Line Status Infusing 10/10/23 1200   Line Care Connections checked and tightened 10/10/23 1200   Phlebitis Assessment No symptoms 10/10/23 1200   Infiltration Assessment 0 10/10/23 1200   Alcohol Cap Used Yes 10/10/23 1200   Dressing Status Clean, dry & intact 10/10/23 1200   Dressing Type Transparent 10/10/23 1200        Opportunity for questions and clarification was provided.       Patient transported with:  [employer]

## 2023-10-11 VITALS
OXYGEN SATURATION: 94 % | HEIGHT: 72 IN | TEMPERATURE: 97.9 F | BODY MASS INDEX: 34.01 KG/M2 | WEIGHT: 251.12 LBS | RESPIRATION RATE: 18 BRPM | SYSTOLIC BLOOD PRESSURE: 125 MMHG | HEART RATE: 83 BPM | DIASTOLIC BLOOD PRESSURE: 88 MMHG

## 2023-10-11 LAB
ANION GAP SERPL CALC-SCNC: 8 MMOL/L (ref 2–11)
BUN SERPL-MCNC: 24 MG/DL (ref 8–23)
CALCIUM SERPL-MCNC: 8.8 MG/DL (ref 8.3–10.4)
CHLORIDE SERPL-SCNC: 107 MMOL/L (ref 101–110)
CO2 SERPL-SCNC: 27 MMOL/L (ref 21–32)
CREAT SERPL-MCNC: 1.7 MG/DL (ref 0.8–1.5)
EKG ATRIAL RATE: 72 BPM
EKG DIAGNOSIS: NORMAL
EKG P AXIS: 57 DEGREES
EKG P-R INTERVAL: 178 MS
EKG Q-T INTERVAL: 406 MS
EKG QRS DURATION: 88 MS
EKG QTC CALCULATION (BAZETT): 444 MS
EKG R AXIS: 29 DEGREES
EKG T AXIS: 90 DEGREES
EKG VENTRICULAR RATE: 72 BPM
ERYTHROCYTE [DISTWIDTH] IN BLOOD BY AUTOMATED COUNT: 13.2 % (ref 11.9–14.6)
GLUCOSE SERPL-MCNC: 173 MG/DL (ref 65–100)
HCT VFR BLD AUTO: 49.2 % (ref 41.1–50.3)
HGB BLD-MCNC: 16 G/DL (ref 13.6–17.2)
MCH RBC QN AUTO: 28 PG (ref 26.1–32.9)
MCHC RBC AUTO-ENTMCNC: 32.5 G/DL (ref 31.4–35)
MCV RBC AUTO: 86 FL (ref 82–102)
NRBC # BLD: 0 K/UL (ref 0–0.2)
PLATELET # BLD AUTO: 387 K/UL (ref 150–450)
PMV BLD AUTO: 9.1 FL (ref 9.4–12.3)
POTASSIUM SERPL-SCNC: 3.6 MMOL/L (ref 3.5–5.1)
RBC # BLD AUTO: 5.72 M/UL (ref 4.23–5.6)
SODIUM SERPL-SCNC: 142 MMOL/L (ref 133–143)
WBC # BLD AUTO: 8.8 K/UL (ref 4.3–11.1)

## 2023-10-11 PROCEDURE — 6370000000 HC RX 637 (ALT 250 FOR IP): Performed by: INTERNAL MEDICINE

## 2023-10-11 PROCEDURE — 85027 COMPLETE CBC AUTOMATED: CPT

## 2023-10-11 PROCEDURE — 80048 BASIC METABOLIC PNL TOTAL CA: CPT

## 2023-10-11 PROCEDURE — G0378 HOSPITAL OBSERVATION PER HR: HCPCS

## 2023-10-11 PROCEDURE — 96366 THER/PROPH/DIAG IV INF ADDON: CPT

## 2023-10-11 PROCEDURE — 99213 OFFICE O/P EST LOW 20 MIN: CPT | Performed by: INTERNAL MEDICINE

## 2023-10-11 PROCEDURE — 36415 COLL VENOUS BLD VENIPUNCTURE: CPT

## 2023-10-11 PROCEDURE — 6370000000 HC RX 637 (ALT 250 FOR IP): Performed by: PHYSICIAN ASSISTANT

## 2023-10-11 PROCEDURE — 2580000003 HC RX 258: Performed by: PHYSICIAN ASSISTANT

## 2023-10-11 RX ORDER — HYDROCODONE BITARTRATE AND ACETAMINOPHEN 5; 325 MG/1; MG/1
1 TABLET ORAL EVERY 6 HOURS PRN
Qty: 10 TABLET | Refills: 0 | Status: SHIPPED | OUTPATIENT
Start: 2023-10-11 | End: 2023-10-14

## 2023-10-11 RX ADMIN — LOSARTAN POTASSIUM 50 MG: 50 TABLET, FILM COATED ORAL at 07:47

## 2023-10-11 RX ADMIN — APIXABAN 5 MG: 5 TABLET, FILM COATED ORAL at 07:47

## 2023-10-11 RX ADMIN — METOPROLOL SUCCINATE 100 MG: 100 TABLET, EXTENDED RELEASE ORAL at 07:47

## 2023-10-11 RX ADMIN — SODIUM CHLORIDE, PRESERVATIVE FREE 10 ML: 5 INJECTION INTRAVENOUS at 07:47

## 2023-10-11 RX ADMIN — HYDROCODONE BITARTRATE AND ACETAMINOPHEN 1 TABLET: 5; 325 TABLET ORAL at 04:05

## 2023-10-11 RX ADMIN — ATORVASTATIN CALCIUM 20 MG: 20 TABLET, FILM COATED ORAL at 07:47

## 2023-10-11 ASSESSMENT — PAIN DESCRIPTION - ORIENTATION: ORIENTATION: RIGHT

## 2023-10-11 ASSESSMENT — PAIN DESCRIPTION - DESCRIPTORS: DESCRIPTORS: ACHING

## 2023-10-11 ASSESSMENT — PAIN SCALES - GENERAL
PAINLEVEL_OUTOF10: 6
PAINLEVEL_OUTOF10: 0

## 2023-10-11 ASSESSMENT — PAIN DESCRIPTION - LOCATION: LOCATION: SHOULDER

## 2023-10-11 NOTE — CARE COORDINATION
Discharge order is in. Pt is discharging home today in stable condition. No discharge needs identified. Tx goals met. 10/11/23 [address] Discharge   Transition of Care Consult (CM Consult) Discharge [address] [facility] Provided? Yes   Mode of Transport at Discharge Other (see comment)  (Family)   Confirm Follow Up Transport Family   Condition of Participation: Discharge Planning   The Patient and/or Patient Representative was provided with a Choice of Provider? Patient   The Patient and/Or Patient Representative agree with the Discharge Plan? Yes   Freedom of Choice list was provided with basic dialogue that supports the patient's individualized plan of care/goals, treatment preferences, and shares the quality data associated with the providers?   Yes

## 2023-10-11 NOTE — PLAN OF CARE
Problem: Discharge Planning  Goal: Discharge to home or other facility with appropriate resources  10/11/2023 0900 by Marcelo Simmonds, RN  Outcome: Adequate for Discharge  10/10/2023 2128 by Ulysses Adjutant, RN  Outcome: Progressing  Flowsheets  Taken 10/10/2023 2055 by Ulysses Adjutant, RN  Discharge to home or other facility with appropriate resources:   Identify barriers to discharge with patient and caregiver   Arrange for needed discharge resources and transportation as appropriate   Identify discharge learning needs (meds, wound care, etc)   Arrange for interpreters to assist at discharge as needed   Refer to discharge planning if patient needs post-hospital services based on physician order or complex needs related to functional status, cognitive ability or social support system  Taken 10/10/2023 0729 by Hussein Belcher RN  Discharge to home or other facility with appropriate resources: Identify barriers to discharge with patient and caregiver     Problem: Pain  Goal: Verbalizes/displays adequate comfort level or baseline comfort level  10/11/2023 0900 by Marcelo Simmonds, RN  Outcome: Adequate for Discharge  10/10/2023 2128 by Ulysses Adjutant, RN  Outcome: Progressing

## 2023-10-11 NOTE — PROGRESS NOTES
Discharge paperwork given to patient. Patient verbalized understanding of education given and all questions/concerns addressed. IV and heart monitor removed.

## 2023-10-11 NOTE — PLAN OF CARE
Problem: Discharge Planning  Goal: Discharge to home or other facility with appropriate resources  Outcome: Progressing  Flowsheets  Taken 10/10/2023 2055 by Ulysses Adjutant, RN  Discharge to home or other facility with appropriate resources:   Identify barriers to discharge with patient and caregiver   Arrange for needed discharge resources and transportation as appropriate   Identify discharge learning needs (meds, wound care, etc)   Arrange for interpreters to assist at discharge as needed   Refer to discharge planning if patient needs post-hospital services based on physician order or complex needs related to functional status, cognitive ability or social support system  Taken 10/10/2023 0729 by Hussein Belcher RN  Discharge to home or other facility with appropriate resources: Identify barriers to discharge with patient and caregiver     Problem: Pain  Goal: Verbalizes/displays adequate comfort level or baseline comfort level  Outcome: Progressing

## 2023-10-12 ENCOUNTER — TELEMEDICINE (OUTPATIENT)
Dept: FAMILY MEDICINE CLINIC | Facility: CLINIC | Age: 63
End: 2023-10-12
Payer: COMMERCIAL

## 2023-10-12 DIAGNOSIS — I10 PRIMARY HYPERTENSION: ICD-10-CM

## 2023-10-12 DIAGNOSIS — I10 ESSENTIAL (PRIMARY) HYPERTENSION: Primary | ICD-10-CM

## 2023-10-12 DIAGNOSIS — I48.20 CHRONIC ATRIAL FIBRILLATION (HCC): ICD-10-CM

## 2023-10-12 DIAGNOSIS — N18.9 CHRONIC KIDNEY DISEASE, UNSPECIFIED CKD STAGE: ICD-10-CM

## 2023-10-12 PROCEDURE — 99442 PR PHYS/QHP TELEPHONE EVALUATION 11-20 MIN: CPT | Performed by: FAMILY MEDICINE

## 2023-10-12 NOTE — PROGRESS NOTES
Potassium 3.4 (L) 3.5 - 5.1 mmol/L    Chloride 108 101 - 110 mmol/L    CO2 23 21 - 32 mmol/L    Anion Gap 7 2 - 11 mmol/L    Glucose 127 (H) 65 - 100 mg/dL    BUN 27 (H) 8 - 23 MG/DL    Creatinine 2.00 (H) 0.8 - 1.5 MG/DL    Est, Glom Filt Rate 37 (L) >60 ml/min/1.73m2    Calcium 9.2 8.3 - 10.4 MG/DL   Magnesium    Collection Time: 10/10/23  3:28 AM   Result Value Ref Range    Magnesium 1.9 1.8 - 2.4 mg/dL   LORENA with possible cardioversion (PRN contrast/bubble/3D)    Collection Time: 10/10/23  4:02 PM   Result Value Ref Range    Body Surface Area 2.41 m2   EKG 12 Lead    Collection Time: 10/10/23  5:49 PM   Result Value Ref Range    Ventricular Rate 72 BPM    Atrial Rate 72 BPM    P-R Interval 178 ms    QRS Duration 88 ms    Q-T Interval 406 ms    QTc Calculation (Bazett) 444 ms    P Axis 57 degrees    R Axis 29 degrees    T Axis 90 degrees    Diagnosis       Normal sinus rhythm  Nonspecific T wave abnormality  Abnormal ECG  When compared with ECG of 09-OCT-2023 14:37,  Sinus rhythm has replaced Atrial fibrillation  Vent.  rate has decreased BY  69 BPM  ST no longer depressed in Lateral leads  T wave inversion no longer evident in Inferior leads  Confirmed by [facility] JAMSHID & ISABELLA [facility]  MD ()LOUISE ([id number]) on 10/11/2023 6:26:29 AM     CBC    Collection Time: 10/11/23  3:18 AM   Result Value Ref Range    WBC 8.8 4.3 - 11.1 K/uL    RBC 5.72 (H) 4.23 - 5.6 M/uL    Hemoglobin 16.0 13.6 - 17.2 g/dL    Hematocrit 49.2 41.1 - 50.3 %    MCV 86.0 82 - 102 FL    MCH 28.0 26.1 - 32.9 PG    MCHC 32.5 31.4 - 35.0 g/dL    RDW 13.2 11.9 - 14.6 %    Platelets 040 429 - 675 K/uL    MPV 9.1 (L) 9.4 - 12.3 FL    nRBC 0.00 0.0 - 0.2 K/uL   Basic Metabolic Panel w/ Reflex to MG    Collection Time: 10/11/23  3:18 AM   Result Value Ref Range    Sodium 142 133 - 143 mmol/L    Potassium 3.6 3.5 - 5.1 mmol/L    Chloride 107 101 - 110 mmol/L    CO2 27 21 - 32 mmol/L    Anion Gap 8 2 - 11 mmol/L    Glucose 173 (H) 65 - 100 mg/dL    BUN 24 (H) 8 - 23 MG/DL

## 2023-11-15 ENCOUNTER — INITIAL CONSULT (OUTPATIENT)
Age: 63
End: 2023-11-15
Payer: MEDICARE

## 2023-11-15 VITALS
BODY MASS INDEX: 33.13 KG/M2 | HEART RATE: 87 BPM | DIASTOLIC BLOOD PRESSURE: 82 MMHG | HEIGHT: 73 IN | SYSTOLIC BLOOD PRESSURE: 136 MMHG | WEIGHT: 250 LBS

## 2023-11-15 DIAGNOSIS — I10 PRIMARY HYPERTENSION: ICD-10-CM

## 2023-11-15 DIAGNOSIS — I48.19 PERSISTENT ATRIAL FIBRILLATION (HCC): Primary | ICD-10-CM

## 2023-11-15 PROCEDURE — 3079F DIAST BP 80-89 MM HG: CPT | Performed by: INTERNAL MEDICINE

## 2023-11-15 PROCEDURE — G8417 CALC BMI ABV UP PARAM F/U: HCPCS | Performed by: INTERNAL MEDICINE

## 2023-11-15 PROCEDURE — 93000 ELECTROCARDIOGRAM COMPLETE: CPT | Performed by: INTERNAL MEDICINE

## 2023-11-15 PROCEDURE — 1123F ACP DISCUSS/DSCN MKR DOCD: CPT | Performed by: INTERNAL MEDICINE

## 2023-11-15 PROCEDURE — G8484 FLU IMMUNIZE NO ADMIN: HCPCS | Performed by: INTERNAL MEDICINE

## 2023-11-15 PROCEDURE — 3017F COLORECTAL CA SCREEN DOC REV: CPT | Performed by: INTERNAL MEDICINE

## 2023-11-15 PROCEDURE — 99204 OFFICE O/P NEW MOD 45 MIN: CPT | Performed by: INTERNAL MEDICINE

## 2023-11-15 PROCEDURE — 3075F SYST BP GE 130 - 139MM HG: CPT | Performed by: INTERNAL MEDICINE

## 2023-11-15 PROCEDURE — 4004F PT TOBACCO SCREEN RCVD TLK: CPT | Performed by: INTERNAL MEDICINE

## 2023-11-15 PROCEDURE — G8427 DOCREV CUR MEDS BY ELIG CLIN: HCPCS | Performed by: INTERNAL MEDICINE

## 2023-11-15 NOTE — PROGRESS NOTES
[address], [address]  PHONE: [address] [place]  1960    Chief Complant:    Chief Complaint   Patient presents with    Atrial Fibrillation    Consultation     Consultation is requested by [unfilled] for evaluation of Atrial Fibrillation and Consultation    Reason for Consultation: afib    History:  Darci Johnston is a very pleasant 70 y.o. male with a past medical and cardiac history significant for HTN, HLD, CKDIII, morbid obesity, persistent atrial fibrillation s/p LORENA/DCCV 3/2022 with recent admission with afib s/p repeat LORENA/DCCV and presents for EP consultation for afib. Pt presented to ER with complaints of worsening SOB and palpitations, rapid HR. He has been feeling poorly for several days. He was placed on cardizem and had DCCV. He has been feeling well since D/C. Cardiac PMH: (Old records have been reviewed and summarized below)  TTE (3/22/22): EF 55-60%, mod LAE    Reviewed office note Dr. Raghav Roberson 10/12/23  Reviewed D/C summary Dr. Francine Brown 10/11/23    Past Medical History, Past Surgical History, Family history, Social History, and Medications were all reviewed with the patient today and updated as necessary. Current Outpatient Medications   Medication Sig Dispense Refill    amLODIPine (NORVASC) 10 MG tablet Take 1 tablet by mouth daily TAKE 1 TABLET BY MOUTH EVERY DAY 90 tablet 1    apixaban (ELIQUIS) 5 MG TABS tablet Take 1 tablet by mouth 2 times daily TAKE 1 TABLET BY MOUTH TWICE A  tablet 1    atorvastatin (LIPITOR) 20 MG tablet Take 1 tablet by mouth daily 30 tablet 2    metoprolol succinate (TOPROL XL) 100 MG extended release tablet Take 1 tablet by mouth daily TAKE 1 TABLET BY MOUTH EVERY DAY 90 tablet 1    olmesartan-hydroCHLOROthiazide (BENICAR HCT) 40-25 MG per tablet Take 1 tablet by mouth daily TAKE 1 TABLET BY MOUTH EVERY DAY 90 tablet 1     No current facility-administered medications for this visit.

## 2023-11-29 ENCOUNTER — APPOINTMENT (OUTPATIENT)
Dept: GENERAL RADIOLOGY | Age: 63
End: 2023-11-29
Payer: MEDICARE

## 2023-11-29 ENCOUNTER — HOSPITAL ENCOUNTER (OUTPATIENT)
Age: 63
Setting detail: OBSERVATION
Discharge: HOME | End: 2023-12-01
Attending: EMERGENCY MEDICINE | Admitting: INTERNAL MEDICINE
Payer: MEDICARE

## 2023-11-29 DIAGNOSIS — I48.91 ATRIAL FIBRILLATION WITH RAPID VENTRICULAR RESPONSE (HCC): Primary | ICD-10-CM

## 2023-11-29 DIAGNOSIS — N17.9 ACUTE KIDNEY INJURY (HCC): ICD-10-CM

## 2023-11-29 DIAGNOSIS — I48.91 A-FIB (HCC): ICD-10-CM

## 2023-11-29 DIAGNOSIS — I48.91 ATRIAL FIBRILLATION, UNSPECIFIED TYPE (HCC): ICD-10-CM

## 2023-11-29 PROCEDURE — 84484 ASSAY OF TROPONIN QUANT: CPT

## 2023-11-29 PROCEDURE — 93005 ELECTROCARDIOGRAM TRACING: CPT | Performed by: EMERGENCY MEDICINE

## 2023-11-29 PROCEDURE — 99285 EMERGENCY DEPT VISIT HI MDM: CPT

## 2023-11-29 PROCEDURE — 94761 N-INVAS EAR/PLS OXIMETRY MLT: CPT

## 2023-11-29 PROCEDURE — 80048 BASIC METABOLIC PNL TOTAL CA: CPT

## 2023-11-29 PROCEDURE — 85025 COMPLETE CBC W/AUTO DIFF WBC: CPT

## 2023-11-29 PROCEDURE — 83735 ASSAY OF MAGNESIUM: CPT

## 2023-11-29 PROCEDURE — 71046 X-RAY EXAM CHEST 2 VIEWS: CPT

## 2023-11-29 PROCEDURE — 83880 ASSAY OF NATRIURETIC PEPTIDE: CPT

## 2023-11-29 RX ORDER — DILTIAZEM HYDROCHLORIDE 5 MG/ML
20 INJECTION INTRAVENOUS
Status: COMPLETED | OUTPATIENT
Start: 2023-11-29 | End: 2023-11-30

## 2023-11-29 RX ORDER — 0.9 % SODIUM CHLORIDE 0.9 %
1000 INTRAVENOUS SOLUTION INTRAVENOUS ONCE
Status: COMPLETED | OUTPATIENT
Start: 2023-11-30 | End: 2023-11-30

## 2023-11-29 ASSESSMENT — PAIN SCALES - GENERAL: PAINLEVEL_OUTOF10: 0

## 2023-11-29 ASSESSMENT — LIFESTYLE VARIABLES
HOW MANY STANDARD DRINKS CONTAINING ALCOHOL DO YOU HAVE ON A TYPICAL DAY: PATIENT DOES NOT DRINK
HOW OFTEN DO YOU HAVE A DRINK CONTAINING ALCOHOL: NEVER

## 2023-11-29 ASSESSMENT — PAIN - FUNCTIONAL ASSESSMENT: PAIN_FUNCTIONAL_ASSESSMENT: 0-10

## 2023-11-30 PROBLEM — N18.9 CKD (CHRONIC KIDNEY DISEASE): Status: RESOLVED | Noted: 2018-09-05 | Resolved: 2023-11-30

## 2023-11-30 PROBLEM — D72.829 LEUKOCYTOSIS: Status: ACTIVE | Noted: 2023-11-30

## 2023-11-30 LAB
ANION GAP SERPL CALC-SCNC: 4 MMOL/L (ref 2–11)
APPEARANCE UR: CLEAR
BASOPHILS # BLD: 0 K/UL (ref 0–0.2)
BASOPHILS NFR BLD: 0 % (ref 0–2)
BILIRUB UR QL: NEGATIVE
BUN SERPL-MCNC: 35 MG/DL (ref 8–23)
CALCIUM SERPL-MCNC: 10.1 MG/DL (ref 8.3–10.4)
CHLORIDE SERPL-SCNC: 106 MMOL/L (ref 101–110)
CO2 SERPL-SCNC: 27 MMOL/L (ref 21–32)
COLOR UR: NORMAL
CREAT SERPL-MCNC: 2.4 MG/DL (ref 0.8–1.5)
DIFFERENTIAL METHOD BLD: ABNORMAL
EKG DIAGNOSIS: NORMAL
EKG DIAGNOSIS: NORMAL
EKG Q-T INTERVAL: 316 MS
EKG Q-T INTERVAL: 362 MS
EKG QRS DURATION: 102 MS
EKG QRS DURATION: 96 MS
EKG QTC CALCULATION (BAZETT): 457 MS
EKG QTC CALCULATION (BAZETT): 470 MS
EKG R AXIS: 48 DEGREES
EKG R AXIS: 52 DEGREES
EKG T AXIS: 145 DEGREES
EKG T AXIS: 228 DEGREES
EKG VENTRICULAR RATE: 133 BPM
EKG VENTRICULAR RATE: 96 BPM
EOSINOPHIL # BLD: 0.3 K/UL (ref 0–0.8)
EOSINOPHIL NFR BLD: 2 % (ref 0.5–7.8)
ERYTHROCYTE [DISTWIDTH] IN BLOOD BY AUTOMATED COUNT: 13.9 % (ref 11.9–14.6)
GLUCOSE SERPL-MCNC: 116 MG/DL (ref 65–100)
GLUCOSE UR STRIP.AUTO-MCNC: NEGATIVE MG/DL
HCT VFR BLD AUTO: 54.2 % (ref 41.1–50.3)
HGB BLD-MCNC: 18.1 G/DL (ref 13.6–17.2)
HGB UR QL STRIP: NEGATIVE
IMM GRANULOCYTES # BLD AUTO: 0.2 K/UL (ref 0–0.5)
IMM GRANULOCYTES NFR BLD AUTO: 1 % (ref 0–5)
KETONES UR QL STRIP.AUTO: NEGATIVE MG/DL
LEUKOCYTE ESTERASE UR QL STRIP.AUTO: NEGATIVE
LYMPHOCYTES # BLD: 5.9 K/UL (ref 0.5–4.6)
LYMPHOCYTES NFR BLD: 36 % (ref 13–44)
MAGNESIUM SERPL-MCNC: 2.2 MG/DL (ref 1.8–2.4)
MCH RBC QN AUTO: 28.1 PG (ref 26.1–32.9)
MCHC RBC AUTO-ENTMCNC: 33.4 G/DL (ref 31.4–35)
MCV RBC AUTO: 84.3 FL (ref 82–102)
MONOCYTES # BLD: 1.1 K/UL (ref 0.1–1.3)
MONOCYTES NFR BLD: 7 % (ref 4–12)
NEUTS SEG # BLD: 8.8 K/UL (ref 1.7–8.2)
NEUTS SEG NFR BLD: 54 % (ref 43–78)
NITRITE UR QL STRIP.AUTO: NEGATIVE
NRBC # BLD: 0 K/UL (ref 0–0.2)
NT PRO BNP: 2226 PG/ML (ref 5–125)
PH UR STRIP: 7 (ref 5–9)
PLATELET # BLD AUTO: 436 K/UL (ref 150–450)
PLATELET COMMENT: ABNORMAL
PMV BLD AUTO: 8.8 FL (ref 9.4–12.3)
POTASSIUM SERPL-SCNC: 3.3 MMOL/L (ref 3.5–5.1)
PROT UR STRIP-MCNC: NEGATIVE MG/DL
RBC # BLD AUTO: 6.43 M/UL (ref 4.23–5.6)
RBC MORPH BLD: ABNORMAL
SODIUM SERPL-SCNC: 137 MMOL/L (ref 133–143)
SP GR UR REFRACTOMETRY: 1.02 (ref 1–1.02)
TROPONIN I SERPL HS-MCNC: 14.2 PG/ML (ref 0–14)
UROBILINOGEN UR QL STRIP.AUTO: 1 EU/DL (ref 0.2–1)
WBC # BLD AUTO: 16.3 K/UL (ref 4.3–11.1)
WBC MORPH BLD: ABNORMAL

## 2023-11-30 PROCEDURE — 2580000003 HC RX 258: Performed by: NURSE PRACTITIONER

## 2023-11-30 PROCEDURE — 93010 ELECTROCARDIOGRAM REPORT: CPT | Performed by: INTERNAL MEDICINE

## 2023-11-30 PROCEDURE — 96361 HYDRATE IV INFUSION ADD-ON: CPT

## 2023-11-30 PROCEDURE — G0378 HOSPITAL OBSERVATION PER HR: HCPCS

## 2023-11-30 PROCEDURE — 99214 OFFICE O/P EST MOD 30 MIN: CPT | Performed by: INTERNAL MEDICINE

## 2023-11-30 PROCEDURE — 6370000000 HC RX 637 (ALT 250 FOR IP): Performed by: NURSE PRACTITIONER

## 2023-11-30 PROCEDURE — 36415 COLL VENOUS BLD VENIPUNCTURE: CPT

## 2023-11-30 PROCEDURE — 81003 URINALYSIS AUTO W/O SCOPE: CPT

## 2023-11-30 PROCEDURE — 87040 BLOOD CULTURE FOR BACTERIA: CPT

## 2023-11-30 PROCEDURE — 6370000000 HC RX 637 (ALT 250 FOR IP): Performed by: INTERNAL MEDICINE

## 2023-11-30 PROCEDURE — 93005 ELECTROCARDIOGRAM TRACING: CPT | Performed by: INTERNAL MEDICINE

## 2023-11-30 PROCEDURE — 96376 TX/PRO/DX INJ SAME DRUG ADON: CPT

## 2023-11-30 PROCEDURE — 2580000003 HC RX 258: Performed by: INTERNAL MEDICINE

## 2023-11-30 PROCEDURE — 2500000003 HC RX 250 WO HCPCS: Performed by: PHYSICIAN ASSISTANT

## 2023-11-30 PROCEDURE — 94760 N-INVAS EAR/PLS OXIMETRY 1: CPT

## 2023-11-30 PROCEDURE — 96366 THER/PROPH/DIAG IV INF ADDON: CPT

## 2023-11-30 PROCEDURE — 2700000000 HC OXYGEN THERAPY PER DAY

## 2023-11-30 PROCEDURE — 96365 THER/PROPH/DIAG IV INF INIT: CPT

## 2023-11-30 PROCEDURE — 96374 THER/PROPH/DIAG INJ IV PUSH: CPT

## 2023-11-30 PROCEDURE — 2500000003 HC RX 250 WO HCPCS: Performed by: NURSE PRACTITIONER

## 2023-11-30 PROCEDURE — 2580000003 HC RX 258: Performed by: PHYSICIAN ASSISTANT

## 2023-11-30 RX ORDER — MAGNESIUM SULFATE IN WATER 40 MG/ML
2000 INJECTION, SOLUTION INTRAVENOUS PRN
Status: DISCONTINUED | OUTPATIENT
Start: 2023-11-30 | End: 2023-12-01 | Stop reason: HOSPADM

## 2023-11-30 RX ORDER — HYDROCHLOROTHIAZIDE 25 MG/1
25 TABLET ORAL DAILY
Status: DISCONTINUED | OUTPATIENT
Start: 2023-11-30 | End: 2023-12-01

## 2023-11-30 RX ORDER — SODIUM CHLORIDE 9 MG/ML
INJECTION, SOLUTION INTRAVENOUS CONTINUOUS
Status: ACTIVE | OUTPATIENT
Start: 2023-11-30 | End: 2023-11-30

## 2023-11-30 RX ORDER — POTASSIUM CHLORIDE 20 MEQ/1
40 TABLET, EXTENDED RELEASE ORAL ONCE
Status: COMPLETED | OUTPATIENT
Start: 2023-11-30 | End: 2023-11-30

## 2023-11-30 RX ORDER — SODIUM CHLORIDE 0.9 % (FLUSH) 0.9 %
5-40 SYRINGE (ML) INJECTION PRN
Status: DISCONTINUED | OUTPATIENT
Start: 2023-11-30 | End: 2023-12-01 | Stop reason: HOSPADM

## 2023-11-30 RX ORDER — POTASSIUM CHLORIDE 7.45 MG/ML
10 INJECTION INTRAVENOUS PRN
Status: DISCONTINUED | OUTPATIENT
Start: 2023-11-30 | End: 2023-12-01 | Stop reason: HOSPADM

## 2023-11-30 RX ORDER — METOPROLOL SUCCINATE 100 MG/1
100 TABLET, EXTENDED RELEASE ORAL DAILY
Status: DISCONTINUED | OUTPATIENT
Start: 2023-11-30 | End: 2023-12-01 | Stop reason: HOSPADM

## 2023-11-30 RX ORDER — FLECAINIDE ACETATE 50 MG/1
50 TABLET ORAL 2 TIMES DAILY
Status: DISCONTINUED | OUTPATIENT
Start: 2023-11-30 | End: 2023-12-01

## 2023-11-30 RX ORDER — ONDANSETRON 2 MG/ML
4 INJECTION INTRAMUSCULAR; INTRAVENOUS EVERY 4 HOURS PRN
Status: DISCONTINUED | OUTPATIENT
Start: 2023-11-30 | End: 2023-12-01 | Stop reason: HOSPADM

## 2023-11-30 RX ORDER — NITROGLYCERIN 0.4 MG/1
0.4 TABLET SUBLINGUAL EVERY 5 MIN PRN
Status: DISCONTINUED | OUTPATIENT
Start: 2023-11-30 | End: 2023-12-01 | Stop reason: HOSPADM

## 2023-11-30 RX ORDER — LIDOCAINE 4 G/G
1 PATCH TOPICAL DAILY
Status: DISCONTINUED | OUTPATIENT
Start: 2023-11-30 | End: 2023-12-01 | Stop reason: HOSPADM

## 2023-11-30 RX ORDER — POTASSIUM CHLORIDE 20 MEQ/1
40 TABLET, EXTENDED RELEASE ORAL PRN
Status: DISCONTINUED | OUTPATIENT
Start: 2023-11-30 | End: 2023-12-01 | Stop reason: HOSPADM

## 2023-11-30 RX ORDER — LOSARTAN POTASSIUM 50 MG/1
100 TABLET ORAL DAILY
Status: DISCONTINUED | OUTPATIENT
Start: 2023-11-30 | End: 2023-12-01 | Stop reason: HOSPADM

## 2023-11-30 RX ORDER — ACETAMINOPHEN 325 MG/1
650 TABLET ORAL EVERY 6 HOURS PRN
Status: DISCONTINUED | OUTPATIENT
Start: 2023-11-30 | End: 2023-12-01 | Stop reason: HOSPADM

## 2023-11-30 RX ORDER — SODIUM CHLORIDE 0.9 % (FLUSH) 0.9 %
5-40 SYRINGE (ML) INJECTION EVERY 12 HOURS SCHEDULED
Status: DISCONTINUED | OUTPATIENT
Start: 2023-11-30 | End: 2023-12-01 | Stop reason: HOSPADM

## 2023-11-30 RX ORDER — DILTIAZEM HYDROCHLORIDE 5 MG/ML
10 INJECTION INTRAVENOUS ONCE
Status: COMPLETED | OUTPATIENT
Start: 2023-11-30 | End: 2023-11-30

## 2023-11-30 RX ORDER — OLMESARTAN MEDOXOMIL AND HYDROCHLOROTHIAZIDE 40/25 40; 25 MG/1; MG/1
1 TABLET ORAL DAILY
Status: DISCONTINUED | OUTPATIENT
Start: 2023-11-30 | End: 2023-11-30 | Stop reason: SDUPTHER

## 2023-11-30 RX ORDER — ATORVASTATIN CALCIUM 20 MG/1
20 TABLET, FILM COATED ORAL DAILY
Status: DISCONTINUED | OUTPATIENT
Start: 2023-11-30 | End: 2023-12-01 | Stop reason: HOSPADM

## 2023-11-30 RX ORDER — POLYETHYLENE GLYCOL 3350 17 G/17G
17 POWDER, FOR SOLUTION ORAL DAILY PRN
Status: DISCONTINUED | OUTPATIENT
Start: 2023-11-30 | End: 2023-12-01 | Stop reason: HOSPADM

## 2023-11-30 RX ADMIN — POTASSIUM CHLORIDE 40 MEQ: 1500 TABLET, EXTENDED RELEASE ORAL at 09:38

## 2023-11-30 RX ADMIN — ACETAMINOPHEN 650 MG: 325 TABLET ORAL at 19:56

## 2023-11-30 RX ADMIN — SODIUM CHLORIDE 5 MG/HR: 900 INJECTION, SOLUTION INTRAVENOUS at 10:11

## 2023-11-30 RX ADMIN — SODIUM CHLORIDE: 9 INJECTION, SOLUTION INTRAVENOUS at 09:48

## 2023-11-30 RX ADMIN — DILTIAZEM HYDROCHLORIDE 20 MG: 5 INJECTION, SOLUTION INTRAVENOUS at 01:37

## 2023-11-30 RX ADMIN — METOPROLOL SUCCINATE 100 MG: 100 TABLET, EXTENDED RELEASE ORAL at 09:38

## 2023-11-30 RX ADMIN — DILTIAZEM HYDROCHLORIDE 10 MG: 5 INJECTION, SOLUTION INTRAVENOUS at 10:06

## 2023-11-30 RX ADMIN — SODIUM CHLORIDE, PRESERVATIVE FREE 10 ML: 5 INJECTION INTRAVENOUS at 19:58

## 2023-11-30 RX ADMIN — POTASSIUM CHLORIDE 40 MEQ: 1500 TABLET, EXTENDED RELEASE ORAL at 04:24

## 2023-11-30 RX ADMIN — LOSARTAN POTASSIUM 100 MG: 50 TABLET, FILM COATED ORAL at 09:38

## 2023-11-30 RX ADMIN — ATORVASTATIN CALCIUM 20 MG: 20 TABLET, FILM COATED ORAL at 09:38

## 2023-11-30 RX ADMIN — FLECAINIDE ACETATE 50 MG: 50 TABLET ORAL at 09:38

## 2023-11-30 RX ADMIN — ACETAMINOPHEN 650 MG: 325 TABLET ORAL at 09:50

## 2023-11-30 RX ADMIN — APIXABAN 5 MG: 5 TABLET, FILM COATED ORAL at 09:38

## 2023-11-30 RX ADMIN — SODIUM CHLORIDE, PRESERVATIVE FREE 10 ML: 5 INJECTION INTRAVENOUS at 09:38

## 2023-11-30 RX ADMIN — APIXABAN 5 MG: 5 TABLET, FILM COATED ORAL at 19:57

## 2023-11-30 RX ADMIN — SODIUM CHLORIDE 1000 ML: 9 INJECTION, SOLUTION INTRAVENOUS at 00:08

## 2023-11-30 RX ADMIN — FLECAINIDE ACETATE 50 MG: 50 TABLET ORAL at 19:57

## 2023-11-30 ASSESSMENT — ENCOUNTER SYMPTOMS
EYES NEGATIVE: 1
ALLERGIC/IMMUNOLOGIC NEGATIVE: 1
GASTROINTESTINAL NEGATIVE: 1
RESPIRATORY NEGATIVE: 1

## 2023-11-30 ASSESSMENT — PAIN SCALES - GENERAL
PAINLEVEL_OUTOF10: 3
PAINLEVEL_OUTOF10: 3

## 2023-11-30 ASSESSMENT — PAIN DESCRIPTION - LOCATION
LOCATION: ELBOW
LOCATION: HEAD

## 2023-11-30 ASSESSMENT — PAIN DESCRIPTION - DESCRIPTORS
DESCRIPTORS: ACHING
DESCRIPTORS: ACHING

## 2023-11-30 ASSESSMENT — PAIN - FUNCTIONAL ASSESSMENT
PAIN_FUNCTIONAL_ASSESSMENT: PREVENTS OR INTERFERES SOME ACTIVE ACTIVITIES AND ADLS
PAIN_FUNCTIONAL_ASSESSMENT: ACTIVITIES ARE NOT PREVENTED

## 2023-11-30 ASSESSMENT — PAIN DESCRIPTION - ORIENTATION: ORIENTATION: RIGHT

## 2023-11-30 NOTE — ED NOTES
TRANSFER - OUT REPORT:    Verbal report given to bailey de los santos on Sadnra Emerson  being transferred to  for routine progression of patient care       Report consisted of patient's Situation, Background, Assessment and   Recommendations(SBAR). Information from the following report(s) Nurse Handoff Report, ED Encounter Summary, ED SBAR, Adult Overview, Intake/Output, MAR, Recent Results, and Cardiac Rhythm AFIB  was reviewed with the receiving nurse. [place] Assessment:    Presents to emergency department  because of falls (Syncope, seizure, or loss of consciousness): No  Age > 70: No  Altered Mental Status, Intoxication with alcohol or substance confusion (Disorientation, impaired judgment, poor safety awaremess, or inability to follow instructions): No  Impaired Mobility: Ambulates or transfers with assistive devices or assistance; Unable to ambulate or transer.: No  Nursing Judgement: Yes          Lines:   Peripheral IV 11/29/23 Left Antecubital (Active)   Site Assessment Clean, dry & intact 11/29/23 2329   Line Status Flushed;Blood return noted 11/29/23 [place] Connections checked and tightened 11/29/23 2329   Phlebitis Assessment No symptoms 11/29/23 2329   Infiltration Assessment 0 11/29/23 2329   Dressing Status New dressing applied 11/29/23 2329   Dressing Type Transparent 11/29/23 2329   Dressing Intervention New 11/29/23 2329        Opportunity for questions and clarification was provided.       Patient transported with:  Registered Nurse           Jaime Singh RN  11/30/23 5935

## 2023-11-30 NOTE — ED TRIAGE NOTES
Pt arrives to ed via pov with c/o afib with a rate of 100/130, hx of afib, Pt denies chest pain, endorses shob. Pt had afib rvr episode a few months ago as well and admitted.

## 2023-11-30 NOTE — PROGRESS NOTES
ED ATTENDING SHARED SERVICES NOTE:     I have seen and evaluated the patient and performed an independent history and physical exam, and I agree with the assessment and plan as documented in the advanced provider note. I performed the history of present illness, physical exam, and medical decision making in its entirety. With the following updates: Agree with admission. Has failed diltiazem multiple times and has required cardioversion instead. Will attempt medical control and then re-evaluate. Plan for cards consult to request eval, treatment, and admission.      11/30/23 12:23 AM SHELIA Gomez MD

## 2023-11-30 NOTE — CARE COORDINATION
Pt presented to the ED c/o palpitations. Pt was seen by Dr. Juvenal Thomas in the office on 11/15/23 for follow-up and offered ablation, but wanted to continue conservative management. While in the ED, EKG confirmed A. Fib RVR. Medical hx includes: persistent AFib, HTN and HLD. PTA, pt indep with all his ADLs. Lives in [place] but works in [place]. 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 Pt and his son live together in a one story private residence. Is on RA. Denies DME need. PCP established. Medicare A/B verified and able to afford home meds. No discharge needs identified, will remain available. 11/30/23 1059   Service Assessment   Patient Orientation Alert and Oriented   Cognition Alert   History Provided By Patient   Primary Caregiver Self   Support Systems Children;Family Members;Friends/Neighbors   PCP Verified by CM Yes  Tyrese Higgins)   Prior Functional Level Independent in ADLs/IADLs   Current Functional Level Independent in ADLs/IADLs   Can patient return to prior living arrangement Yes   Ability to make needs known: Good   Family able to assist with home care needs: Yes   Would you like for me to discuss the discharge plan with any other family members/significant others, and if so, who? No   Financial Resources Medicare   Community Resources None   Social/Functional History   Lives With Son   Type of [address] [place] [address] Dr One level   [address] [facility] Help From [address] [facility]   Ambulation Assistance Independent   Transfer Assistance Independent   Active  Yes   Mode of Transportation Car   Occupation Full time employment   Discharge [address] Prior To Admission None   DME Ordered?  No   Potential Assistance Purchasing Medications No   Type of Home Care Services None   Services At/After Discharge

## 2023-11-30 NOTE — H&P
[facility] Cardiology History & Physical      Date of  Admission: 11/29/2023 11:29 PM     Primary Care Physician: Dr. Ann Marie Michel  Primary Cardiologist: Dr. Braden Cerda  Admitting Physician: Dr. Margarita Ngo    CC: palpitations    HPI:  Warden Mar is a 70 y.o. male with past medical history of persistent AFIB on Eliquis, HTN, and HLD who presented to the ER with complaints of palpitations. Patient was admitted to this facility last month for AFIB w RVR that required LORENA/eCV. He was seen by Dr. Braden Cerda in the office on 11/15 for follow-up and offered ablation, but wanted to continue conservative management. Upon arrival to the ER, EKG confirmed atrial fibrillation with RVR. He was given 20mg IV Cardizem in the ER. He reports possibly missing occasional dose of BB and/or Eliquis in the past. Blood work in the ER Shows WBC of 16 (this is new when compared to bloodwork in October). Past Medical History:   Diagnosis Date    Atrial fibrillation with rapid ventricular response ([address]) 9/5/2018    Hypercholesterolemia     Hypertension 2007    Kidney disease     Pyoderma gangrenosum 9/5/2018      History reviewed. No pertinent surgical history. No Known Allergies   Social History     Socioeconomic History    Marital status:      Spouse name: Not on file    Number of children: Not on file    Years of education: Not on file    Highest education level: Not on file   Occupational History    Not on file   Tobacco Use    Smoking status: Every Day     Packs/day: .5     Types: Cigarettes    Smokeless tobacco: Never   Substance and Sexual Activity    Alcohol use:  Yes     Alcohol/week: 1.0 standard drink of alcohol    Drug use: No    Sexual activity: Not on file   Other Topics Concern    Not on file   Social History Narrative    Not on file     Social Determinants of Health     Financial Resource Strain: Not on file   Food Insecurity: No Food Insecurity (11/30/2023)    Hunger Vital Sign     Worried About Running moderately dilated. No left atrial appendage thrombus noted. Right Atrium: Right atrium is mildly dilated. Labs:   Recent Results (from the past 24 hour(s))   EKG 12 Lead    Collection Time: 11/29/23 11:14 PM   Result Value Ref Range    Ventricular Rate 133 BPM    QRS Duration 96 ms    Q-T Interval 316 ms    QTc Calculation (Bazett) 470 ms    R Axis 52 degrees    T Axis 228 degrees    Diagnosis       Atrial fibrillation with rapid ventricular response  ST & T wave abnormality, consider inferolateral ischemia  Abnormal ECG  When compared with ECG of 10-OCT-2023 17:49,  Atrial fibrillation has replaced Sinus rhythm  Vent.  rate has increased BY  61 BPM  T wave inversion now evident in Inferior leads     Basic Metabolic Panel    Collection Time: 11/29/23 11:29 PM   Result Value Ref Range    Sodium 137 133 - 143 mmol/L    Potassium 3.3 (L) 3.5 - 5.1 mmol/L    Chloride 106 101 - 110 mmol/L    CO2 27 21 - 32 mmol/L    Anion Gap 4 2 - 11 mmol/L    Glucose 116 (H) 65 - 100 mg/dL    BUN 35 (H) 8 - 23 MG/DL    Creatinine 2.40 (H) 0.8 - 1.5 MG/DL    Est, Glom Filt Rate 29 (L) >60 ml/min/1.73m2    Calcium 10.1 8.3 - 10.4 MG/DL   CBC with Auto Differential    Collection Time: 11/29/23 11:29 PM   Result Value Ref Range    WBC 16.3 (H) 4.3 - 11.1 K/uL    RBC 6.43 (H) 4.23 - 5.6 M/uL    Hemoglobin 18.1 (H) 13.6 - 17.2 g/dL    Hematocrit 54.2 (H) 41.1 - 50.3 %    MCV 84.3 82 - 102 FL    MCH 28.1 26.1 - 32.9 PG    MCHC 33.4 31.4 - 35.0 g/dL    RDW 13.9 11.9 - 14.6 %    Platelets 472 794 - 707 K/uL    MPV 8.8 (L) 9.4 - 12.3 FL    nRBC 0.00 0.0 - 0.2 K/uL    Neutrophils % 54 43 - 78 %    Lymphocytes % 36 13 - 44 %    Monocytes % 7 4.0 - 12.0 %    Eosinophils % 2 0.5 - 7.8 %    Basophils % 0 0.0 - 2.0 %    Immature Granulocytes 1 0.0 - 5.0 %    Neutrophils Absolute 8.8 (H) 1.7 - 8.2 K/UL    Lymphocytes Absolute 5.9 (H) 0.5 - 4.6 K/UL    Monocytes Absolute 1.1 0.1 - 1.3 K/UL    Eosinophils Absolute 0.3 0.0 - 0.8 K/UL    Basophils

## 2023-11-30 NOTE — ED PROVIDER NOTES
Emergency Department Provider Note       PCP: Charbel Jones DO   Age: 70 y.o. Sex: male     DISPOSITION Admitted 11/30/2023 03:17:34 AM       ICD-10-CM    1. Atrial fibrillation, unspecified type ([address])  I48.91       2. Acute kidney injury ([address])  N17.9           Medical Decision Making     Complexity of Problems Addressed:  1 or more acute illnesses that pose a threat to life or bodily function. Data Reviewed and Analyzed:   I independently ordered and reviewed each unique test.  I reviewed external records: ED visit note from an outside group. I reviewed external records: provider visit note from PCP. I reviewed external records: provider visit note from outside specialist.  I reviewed external records: previous EKG including cardiologist interpretation. I reviewed external records: previous lab results from outside ED. I reviewed external records: previous imaging study including radiologist interpretation. I independently ordered and interpreted the ED EKG in the absence of a Cardiologist.    Rate: 133  EKG Interpretation: EKG Interpretation: atrial fibrillation  ST Segments: Normal ST segments - NO STEMI  I interpreted the X-rays no ptx. Discussion of management or test interpretation. Patient here with complaint of fatigue. Has history of atrial fibrillation with RVR. Is followed by [facility] cardiology. he was last seen about 2 weeks ago by the EP physician within their group. They discussed different modalities for treatment of his atrial fibrillation. This time patient had elected medical management with rate control however now is more interested in maybe some rhythm control. Nevertheless, developed some fatigue and shortness of breath yesterday, today he uses stethoscope to listen to his rhythm and noted that was irregularly irregular so presented here. Was found to be in A-fib RVR in triage with EKG. Troponin and BNP elevated, expectedly.   Given that he has persistent atrial

## 2023-12-01 ENCOUNTER — APPOINTMENT (OUTPATIENT)
Dept: NON INVASIVE DIAGNOSTICS | Age: 63
End: 2023-12-01
Attending: INTERNAL MEDICINE
Payer: MEDICARE

## 2023-12-01 ENCOUNTER — APPOINTMENT (OUTPATIENT)
Dept: CARDIAC CATH/INVASIVE PROCEDURES | Age: 63
End: 2023-12-01
Attending: INTERNAL MEDICINE
Payer: MEDICARE

## 2023-12-01 VITALS
BODY MASS INDEX: 32.79 KG/M2 | HEIGHT: 73 IN | DIASTOLIC BLOOD PRESSURE: 59 MMHG | SYSTOLIC BLOOD PRESSURE: 108 MMHG | TEMPERATURE: 98.1 F | HEART RATE: 60 BPM | OXYGEN SATURATION: 96 % | WEIGHT: 247.4 LBS | RESPIRATION RATE: 17 BRPM

## 2023-12-01 PROBLEM — D72.829 LEUKOCYTOSIS: Status: RESOLVED | Noted: 2023-11-30 | Resolved: 2023-12-01

## 2023-12-01 PROBLEM — I48.91 ATRIAL FIBRILLATION WITH RAPID VENTRICULAR RESPONSE (HCC): Status: RESOLVED | Noted: 2023-10-09 | Resolved: 2023-12-01

## 2023-12-01 LAB
ANION GAP SERPL CALC-SCNC: 6 MMOL/L (ref 2–11)
BUN SERPL-MCNC: 28 MG/DL (ref 8–23)
CALCIUM SERPL-MCNC: 8.8 MG/DL (ref 8.3–10.4)
CHLORIDE SERPL-SCNC: 107 MMOL/L (ref 101–110)
CO2 SERPL-SCNC: 24 MMOL/L (ref 21–32)
CREAT SERPL-MCNC: 1.8 MG/DL (ref 0.8–1.5)
ECHO BSA: 2.43 M2
ECHO BSA: 2.43 M2
ECHO LV EDV A2C: 62 ML
ECHO LV EDV A4C: 73 ML
ECHO LV EDV BP: 68 ML (ref 67–155)
ECHO LV EDV INDEX A4C: 31 ML/M2
ECHO LV EDV INDEX BP: 29 ML/M2
ECHO LV EDV NDEX A2C: 26 ML/M2
ECHO LV EJECTION FRACTION A2C: 66 %
ECHO LV EJECTION FRACTION A4C: 53 %
ECHO LV EJECTION FRACTION BIPLANE: 56 % (ref 55–100)
ECHO LV ESV A2C: 21 ML
ECHO LV ESV A4C: 34 ML
ECHO LV ESV BP: 30 ML (ref 22–58)
ECHO LV ESV INDEX A2C: 9 ML/M2
ECHO LV ESV INDEX A4C: 14 ML/M2
ECHO LV ESV INDEX BP: 13 ML/M2
ECHO LV FRACTIONAL SHORTENING: 24 % (ref 28–44)
ECHO LV INTERNAL DIMENSION DIASTOLE INDEX: 1.57 CM/M2
ECHO LV INTERNAL DIMENSION DIASTOLIC: 3.7 CM (ref 4.2–5.9)
ECHO LV INTERNAL DIMENSION SYSTOLIC INDEX: 1.19 CM/M2
ECHO LV INTERNAL DIMENSION SYSTOLIC: 2.8 CM
ECHO LV IVSD: 1.2 CM (ref 0.6–1)
ECHO LV MASS 2D: 156.7 G (ref 88–224)
ECHO LV MASS INDEX 2D: 66.4 G/M2 (ref 49–115)
ECHO LV POSTERIOR WALL DIASTOLIC: 1.3 CM (ref 0.6–1)
ECHO LV RELATIVE WALL THICKNESS RATIO: 0.7
ECHO RV INTERNAL DIMENSION: 3.5 CM
EKG ATRIAL RATE: 60 BPM
EKG DIAGNOSIS: NORMAL
EKG P AXIS: 16 DEGREES
EKG P-R INTERVAL: 172 MS
EKG Q-T INTERVAL: 422 MS
EKG QRS DURATION: 90 MS
EKG QTC CALCULATION (BAZETT): 422 MS
EKG R AXIS: 9 DEGREES
EKG T AXIS: 76 DEGREES
EKG VENTRICULAR RATE: 60 BPM
ERYTHROCYTE [DISTWIDTH] IN BLOOD BY AUTOMATED COUNT: 13.4 % (ref 11.9–14.6)
GLUCOSE SERPL-MCNC: 117 MG/DL (ref 65–100)
HCT VFR BLD AUTO: 50.7 % (ref 41.1–50.3)
HGB BLD-MCNC: 16.5 G/DL (ref 13.6–17.2)
MCH RBC QN AUTO: 28 PG (ref 26.1–32.9)
MCHC RBC AUTO-ENTMCNC: 32.5 G/DL (ref 31.4–35)
MCV RBC AUTO: 85.9 FL (ref 82–102)
NRBC # BLD: 0 K/UL (ref 0–0.2)
PLATELET # BLD AUTO: 369 K/UL (ref 150–450)
PMV BLD AUTO: 9.3 FL (ref 9.4–12.3)
POTASSIUM SERPL-SCNC: 3.8 MMOL/L (ref 3.5–5.1)
RBC # BLD AUTO: 5.9 M/UL (ref 4.23–5.6)
SODIUM SERPL-SCNC: 137 MMOL/L (ref 133–143)
WBC # BLD AUTO: 11.1 K/UL (ref 4.3–11.1)

## 2023-12-01 PROCEDURE — C8925 2D TEE W OR W/O FOL W/CON,IN: HCPCS

## 2023-12-01 PROCEDURE — 85027 COMPLETE CBC AUTOMATED: CPT

## 2023-12-01 PROCEDURE — 80048 BASIC METABOLIC PNL TOTAL CA: CPT

## 2023-12-01 PROCEDURE — 93312 ECHO TRANSESOPHAGEAL: CPT | Performed by: INTERNAL MEDICINE

## 2023-12-01 PROCEDURE — 93308 TTE F-UP OR LMTD: CPT | Performed by: INTERNAL MEDICINE

## 2023-12-01 PROCEDURE — 93308 TTE F-UP OR LMTD: CPT

## 2023-12-01 PROCEDURE — 93320 DOPPLER ECHO COMPLETE: CPT | Performed by: INTERNAL MEDICINE

## 2023-12-01 PROCEDURE — 93325 DOPPLER ECHO COLOR FLOW MAPG: CPT | Performed by: INTERNAL MEDICINE

## 2023-12-01 PROCEDURE — 6370000000 HC RX 637 (ALT 250 FOR IP): Performed by: NURSE PRACTITIONER

## 2023-12-01 PROCEDURE — 93010 ELECTROCARDIOGRAM REPORT: CPT | Performed by: INTERNAL MEDICINE

## 2023-12-01 PROCEDURE — G0378 HOSPITAL OBSERVATION PER HR: HCPCS

## 2023-12-01 PROCEDURE — 99152 MOD SED SAME PHYS/QHP 5/>YRS: CPT | Performed by: INTERNAL MEDICINE

## 2023-12-01 PROCEDURE — 92960 CARDIOVERSION ELECTRIC EXT: CPT | Performed by: INTERNAL MEDICINE

## 2023-12-01 PROCEDURE — 99152 MOD SED SAME PHYS/QHP 5/>YRS: CPT

## 2023-12-01 PROCEDURE — 6370000000 HC RX 637 (ALT 250 FOR IP): Performed by: INTERNAL MEDICINE

## 2023-12-01 PROCEDURE — 93005 ELECTROCARDIOGRAM TRACING: CPT | Performed by: INTERNAL MEDICINE

## 2023-12-01 PROCEDURE — 6360000002 HC RX W HCPCS: Performed by: INTERNAL MEDICINE

## 2023-12-01 PROCEDURE — 96366 THER/PROPH/DIAG IV INF ADDON: CPT

## 2023-12-01 PROCEDURE — 36415 COLL VENOUS BLD VENIPUNCTURE: CPT

## 2023-12-01 RX ORDER — FLECAINIDE ACETATE 100 MG/1
100 TABLET ORAL 2 TIMES DAILY
Status: DISCONTINUED | OUTPATIENT
Start: 2023-12-01 | End: 2023-12-01 | Stop reason: HOSPADM

## 2023-12-01 RX ORDER — LOSARTAN POTASSIUM 100 MG/1
100 TABLET ORAL DAILY
Qty: 30 TABLET | Refills: 3 | Status: SHIPPED | OUTPATIENT
Start: 2023-12-02

## 2023-12-01 RX ORDER — FENTANYL CITRATE 50 UG/ML
INJECTION, SOLUTION INTRAMUSCULAR; INTRAVENOUS PRN
Status: COMPLETED | OUTPATIENT
Start: 2023-12-01 | End: 2023-12-01

## 2023-12-01 RX ORDER — MIDAZOLAM HYDROCHLORIDE 1 MG/ML
INJECTION INTRAMUSCULAR; INTRAVENOUS PRN
Status: COMPLETED | OUTPATIENT
Start: 2023-12-01 | End: 2023-12-01

## 2023-12-01 RX ORDER — FLECAINIDE ACETATE 100 MG/1
100 TABLET ORAL 2 TIMES DAILY
Qty: 60 TABLET | Refills: 3 | Status: SHIPPED | OUTPATIENT
Start: 2023-12-01

## 2023-12-01 RX ADMIN — MIDAZOLAM 2 MG: 1 INJECTION INTRAMUSCULAR; INTRAVENOUS at 12:20

## 2023-12-01 RX ADMIN — METOPROLOL SUCCINATE 100 MG: 100 TABLET, EXTENDED RELEASE ORAL at 08:44

## 2023-12-01 RX ADMIN — LOSARTAN POTASSIUM 100 MG: 50 TABLET, FILM COATED ORAL at 08:45

## 2023-12-01 RX ADMIN — MIDAZOLAM 2 MG: 1 INJECTION INTRAMUSCULAR; INTRAVENOUS at 12:19

## 2023-12-01 RX ADMIN — APIXABAN 5 MG: 5 TABLET, FILM COATED ORAL at 08:45

## 2023-12-01 RX ADMIN — MIDAZOLAM 2 MG: 1 INJECTION INTRAMUSCULAR; INTRAVENOUS at 12:23

## 2023-12-01 RX ADMIN — ATORVASTATIN CALCIUM 20 MG: 20 TABLET, FILM COATED ORAL at 08:45

## 2023-12-01 RX ADMIN — FENTANYL CITRATE 50 MCG: 50 INJECTION, SOLUTION INTRAMUSCULAR; INTRAVENOUS at 12:19

## 2023-12-01 RX ADMIN — FLECAINIDE ACETATE 50 MG: 50 TABLET ORAL at 08:44

## 2023-12-01 RX ADMIN — FENTANYL CITRATE 25 MCG: 50 INJECTION, SOLUTION INTRAMUSCULAR; INTRAVENOUS at 12:24

## 2023-12-01 NOTE — PROCEDURES
Pre-Procedure Diagnosis  1. Persistent Atrial fibrilllation     Procedure Performed  1. Transesophageal echocardiogram   2. Direct Current Cardioversion    Estimated Blood Loss: None, not applicable    Procedural Description: The patient was brought to the procedure room in a fasting, nonsedated state. The risks, benefits and alternatives of the procedure were reviewed with the patient, and final questions answered. Informed consent was confirmed. A procedural timeout was called and completed per institutional policy. Once appropriate monitors were applied, fentanyl and versed were given in increments of 1-2mg versed and 25 mcg fentanyl to obtain an appropriate level of conscious sedation with continuous oxygen saturation measurement and blood pressure monitoring at minimal increments of every 5 minutes. Once an appropriate level of sedation was achieved, a transesophageal echocardiogram probe was inserted into the esophagus with ease. A comprehensive LORENA study was completed and the full report available in the chart. There was no evidence of spontaneous echo contrast or thrombus in the left atrial appendage. The patient was converted to sinus rhythm with pads across the anterior and posterior chest wall. The patient awoke from his procedure without overt complications. Post Procedure Diagnosis: Normal sinus rhythm    Antwon Barnett MD, MS  Clinical Cardiac Electrophysiology  12/1/2023  12:27 PM

## 2023-12-01 NOTE — PROGRESS NOTES
TRANSFER - OUT REPORT:    Verbal report given to Daphney Garcia on [facility]  being transferred to 3rd floor telemetry for routine progression of patient care       Report consisted of patient's Situation, Background, Assessment and   Recommendations(SBAR). Information from the following report(s) Nurse Handoff Report was reviewed with the receiving nurse. Lines:   Peripheral IV 11/29/23 Left Antecubital (Active)   Site Assessment Clean, dry & intact 12/01/23 0710   Line Status Infusing 12/01/23 0710   Line Care Connections checked and tightened 12/01/23 0710   Phlebitis Assessment No symptoms 12/01/23 0710   Infiltration Assessment 0 12/01/23 0710   Alcohol Cap Used Yes 12/01/23 0710   Dressing Status Clean, dry & intact 12/01/23 0710   Dressing Type Transparent 12/01/23 0710   Dressing Intervention New 11/29/23 2858        Opportunity for questions and clarification was provided.

## 2023-12-01 NOTE — PROGRESS NOTES
LORENA/CVN by Dr Rossi Juárez  II ASA II Mallampati  8 mg versed  75 mcg fentanyl  Viscous Solution given at 1205  1 shock at 200 joules synced  Post cardioversion rhythm NSR  Pt tolerated well.

## 2023-12-01 NOTE — CARE COORDINATION
Discharge order is in. Pt is now discharging home in stable condition. Pt had a LORENA/CVN planned and is now in NSR. Pt was seen and examined by Dr. Yamileth Boyle and was determined stable and ready for discharge. Pt will consider ablation in the future. No other discharge needs identified. Tx goals met. 12/01/23 [address] Discharge   Transition of Care Consult (CM Consult) Discharge Planning   Services At/After Discharge None    Resource Information Provided? No   Mode of Transport at Discharge Other (see comment)  (Family)   Confirm Follow Up Transport Family   Condition of Participation: Discharge Planning   The Patient and/or Patient Representative was provided with a Choice of Provider? Patient   The Patient and/Or Patient Representative agree with the Discharge Plan? Yes   Freedom of Choice list was provided with basic dialogue that supports the patient's individualized plan of care/goals, treatment preferences, and shares the quality data associated with the providers?   Yes

## 2023-12-01 NOTE — PROGRESS NOTES
No new discharge needs identified. Cards plan DCCV today and OP A. Fib ablation. Will remain available.

## 2023-12-06 ENCOUNTER — NURSE ONLY (OUTPATIENT)
Age: 63
End: 2023-12-06
Payer: MEDICARE

## 2023-12-06 DIAGNOSIS — I48.19 PERSISTENT ATRIAL FIBRILLATION (HCC): Primary | ICD-10-CM

## 2023-12-06 PROCEDURE — 93000 ELECTROCARDIOGRAM COMPLETE: CPT | Performed by: INTERNAL MEDICINE

## 2023-12-06 NOTE — PROGRESS NOTES
Pt came in for EKG after starting Flecainide. EKG checked by Dr Tho Mancilla. Per Dr Estrella ceja, continue the medication and keep your f/u\".

## 2024-01-10 ENCOUNTER — OFFICE VISIT (OUTPATIENT)
Age: 64
End: 2024-01-10

## 2024-01-10 VITALS
HEIGHT: 73 IN | DIASTOLIC BLOOD PRESSURE: 120 MMHG | SYSTOLIC BLOOD PRESSURE: 190 MMHG | BODY MASS INDEX: 33.13 KG/M2 | HEART RATE: 74 BPM | WEIGHT: 250 LBS

## 2024-01-10 DIAGNOSIS — I48.19 PERSISTENT ATRIAL FIBRILLATION (HCC): Primary | ICD-10-CM

## 2024-01-10 NOTE — PROGRESS NOTES
Future  -     CBC; Future  -     Magnesium; Future        ASSESSMENT and PLAN  1. Persistent atrial fibrillation failing flecainide: I had a discussion with the Pt today regarding rate and rhythm control strategies, rhythm control strategy treatment options including DCCV, antiarrhythmic therapy, catheter ablation and the combination of the above. I discussed at length the advantages and disadvantages of all treatment strategies.  We discussed the option of cardiac ablation in detail, including discussion of some of the more common, however, very low risks of the procedure including access complications and risks of damage to the heart or surrounding structures.  We discussed the very low risk of esophageal injury which could result in a serious complication. We discussed practices put in place such as esophageal temperature monitoring and reduced energy and ablation time in areas in close proximity to reduce this risk.  A more detailed discussion of possible complications from cardiac ablation will be addressed again during the consent process the day of the procedure.  The patient will also meet with the staff anesthesiologist the day of the procedure to discuss some of the same possible risks, as well as other risks, specific to the patient undergoing anesthesia.  Pt will undergo a transesophageal echocardiogram immediately prior to catheter insertion to completely exclude the possibility of left atrial appendage thrombus. We will follow up with patient post hospitalization.       --LORENA followed by afib ablation       --hold eliquis the day prior     2. CVA protection: eliquis 5mg Q12H    3. HTN: typically controlled, reports checking BP this morning < 140/90, cont to monitor closely at home, cont norvasc 10mg, metooprolol 100mg, benicar 40-25mg     Patient has been instructed and agrees to call our office with any issues or other concerns related to their cardiac condition(s) and/or complaint(s).    No follow-up

## 2024-02-05 SDOH — HEALTH STABILITY: PHYSICAL HEALTH: ON AVERAGE, HOW MANY DAYS PER WEEK DO YOU ENGAGE IN MODERATE TO STRENUOUS EXERCISE (LIKE A BRISK WALK)?: 4 DAYS

## 2024-02-05 SDOH — HEALTH STABILITY: PHYSICAL HEALTH: ON AVERAGE, HOW MANY MINUTES DO YOU ENGAGE IN EXERCISE AT THIS LEVEL?: 10 MIN

## 2024-02-05 ASSESSMENT — PATIENT HEALTH QUESTIONNAIRE - PHQ9
SUM OF ALL RESPONSES TO PHQ QUESTIONS 1-9: 0
2. FEELING DOWN, DEPRESSED OR HOPELESS: 0
SUM OF ALL RESPONSES TO PHQ QUESTIONS 1-9: 0
1. LITTLE INTEREST OR PLEASURE IN DOING THINGS: 0
SUM OF ALL RESPONSES TO PHQ9 QUESTIONS 1 & 2: 0

## 2024-02-05 ASSESSMENT — LIFESTYLE VARIABLES
HOW OFTEN DO YOU HAVE A DRINK CONTAINING ALCOHOL: 4
HOW OFTEN DO YOU HAVE SIX OR MORE DRINKS ON ONE OCCASION: 1
HOW MANY STANDARD DRINKS CONTAINING ALCOHOL DO YOU HAVE ON A TYPICAL DAY: 1 OR 2
HOW MANY STANDARD DRINKS CONTAINING ALCOHOL DO YOU HAVE ON A TYPICAL DAY: 1

## 2024-02-06 ENCOUNTER — TELEPHONE (OUTPATIENT)
Age: 64
End: 2024-02-06

## 2024-02-06 ENCOUNTER — TELEPHONE (OUTPATIENT)
Dept: FAMILY MEDICINE CLINIC | Facility: CLINIC | Age: 64
End: 2024-02-06

## 2024-02-06 ENCOUNTER — OFFICE VISIT (OUTPATIENT)
Dept: FAMILY MEDICINE CLINIC | Facility: CLINIC | Age: 64
End: 2024-02-06
Payer: MEDICARE

## 2024-02-06 VITALS
WEIGHT: 252 LBS | DIASTOLIC BLOOD PRESSURE: 100 MMHG | HEART RATE: 79 BPM | BODY MASS INDEX: 34.13 KG/M2 | HEIGHT: 72 IN | SYSTOLIC BLOOD PRESSURE: 140 MMHG

## 2024-02-06 DIAGNOSIS — I10 HYPERTENSION, UNSPECIFIED TYPE: Primary | ICD-10-CM

## 2024-02-06 DIAGNOSIS — Z87.891 PERSONAL HISTORY OF NICOTINE DEPENDENCE: ICD-10-CM

## 2024-02-06 DIAGNOSIS — G89.29 CHRONIC BILATERAL LOW BACK PAIN WITHOUT SCIATICA: ICD-10-CM

## 2024-02-06 DIAGNOSIS — Z13.6 ENCOUNTER FOR ABDOMINAL AORTIC ANEURYSM (AAA) SCREENING: ICD-10-CM

## 2024-02-06 DIAGNOSIS — Z00.00 ROUTINE GENERAL MEDICAL EXAMINATION AT A HEALTH CARE FACILITY: Primary | ICD-10-CM

## 2024-02-06 DIAGNOSIS — Z12.5 SPECIAL SCREENING, PROSTATE CANCER: ICD-10-CM

## 2024-02-06 DIAGNOSIS — N18.30 STAGE 3 CHRONIC KIDNEY DISEASE, UNSPECIFIED WHETHER STAGE 3A OR 3B CKD (HCC): ICD-10-CM

## 2024-02-06 DIAGNOSIS — Z12.31 SCREENING MAMMOGRAM FOR HIGH-RISK PATIENT: ICD-10-CM

## 2024-02-06 DIAGNOSIS — Z12.11 SPECIAL SCREENING FOR MALIGNANT NEOPLASMS, COLON: ICD-10-CM

## 2024-02-06 DIAGNOSIS — M54.50 CHRONIC BILATERAL LOW BACK PAIN WITHOUT SCIATICA: ICD-10-CM

## 2024-02-06 DIAGNOSIS — E78.01 FAMILIAL HYPERCHOLESTEROLEMIA: ICD-10-CM

## 2024-02-06 DIAGNOSIS — Z00.00 MEDICARE ANNUAL WELLNESS VISIT, SUBSEQUENT: ICD-10-CM

## 2024-02-06 DIAGNOSIS — D68.69 SECONDARY HYPERCOAGULABLE STATE (HCC): ICD-10-CM

## 2024-02-06 DIAGNOSIS — I48.91 ATRIAL FIBRILLATION WITH RVR (HCC): ICD-10-CM

## 2024-02-06 DIAGNOSIS — I10 PRIMARY HYPERTENSION: ICD-10-CM

## 2024-02-06 DIAGNOSIS — E83.42 HYPOMAGNESEMIA: ICD-10-CM

## 2024-02-06 PROCEDURE — 1123F ACP DISCUSS/DSCN MKR DOCD: CPT | Performed by: FAMILY MEDICINE

## 2024-02-06 PROCEDURE — 3017F COLORECTAL CA SCREEN DOC REV: CPT | Performed by: FAMILY MEDICINE

## 2024-02-06 PROCEDURE — G0439 PPPS, SUBSEQ VISIT: HCPCS | Performed by: FAMILY MEDICINE

## 2024-02-06 PROCEDURE — G8484 FLU IMMUNIZE NO ADMIN: HCPCS | Performed by: FAMILY MEDICINE

## 2024-02-06 RX ORDER — ATORVASTATIN CALCIUM 20 MG/1
20 TABLET, FILM COATED ORAL DAILY
Qty: 90 TABLET | Refills: 3 | Status: SHIPPED | OUTPATIENT
Start: 2024-02-06

## 2024-02-06 RX ORDER — METHYLPREDNISOLONE 4 MG/1
TABLET ORAL
Qty: 21 TABLET | Refills: 0 | Status: SHIPPED | OUTPATIENT
Start: 2024-02-06

## 2024-02-06 NOTE — TELEPHONE ENCOUNTER
I called patient to check if he heard from Cardiology and he stated he spoke with Dr Barnett nurse and she will call him tomorrow to go over his meds as he did not have them with him when she called him.  Patient stated per nurse he is supposed to be on two bp meds but he did not know anything about or his pharmacy.

## 2024-02-06 NOTE — PATIENT INSTRUCTIONS
decisions by themselves.  For more information, including forms for your state, see the [employer] website (www.caringinfo.org/planning/advance-directives/).  Follow-up care is a key part of your treatment and safety. Be sure to make and go to all appointments, and call your doctor if you are having problems. It's also a good idea to know your test results and keep a list of the medicines you take.  What should you include in an advance directive?  Many states have a unique advance directive form. (It may ask you to address specific issues.) Or you might use a universal form that's approved by many states.  If your form doesn't tell you what to address, it may be hard to know what to include in your advance directive. Use the questions below to help you get started.  Who do you want to make decisions about your medical care if you are not able to?  What life-support measures do you want if you have a serious illness that gets worse over time or can't be cured?  What are you most afraid of that might happen? (Maybe you're afraid of having pain, losing your independence, or being kept alive by machines.)  Where would you prefer to die? (Your home? A hospital? A nursing home?)  Do you want to donate your organs when you die?  Do you want certain [facility] practices performed before you die?  When should you call for help?  Be sure to contact your doctor if you have any questions.  Where can you learn more?  Go to https://www.[employer].net/patientEd and enter R264 to learn more about \"Advance Directives: Care Instructions.\"  Current as of: March 26, 2023               Content Version: 13.9  © 0013-2188 [employer].   Care instructions adapted under license by [employer]. If you have questions about a medical condition or this instruction, always ask your healthcare professional. [employer] disclaims any warranty or liability for your use of this information.           Starting a Weight Loss

## 2024-02-06 NOTE — TELEPHONE ENCOUNTER
Call Pt, he stated his BP was 150/100 today at his PCP today.     Pt was confident that he was taking all his BP, but after discussion pt was in-fact not taking some of his BP meds and possibly taking the rest of his meds incorrectly.    Pt do not have all his meds in a bottle/with him currently. Pt will call nurse tomorrow to go over meds and reorganize how/what to take.

## 2024-02-06 NOTE — TELEPHONE ENCOUNTER
Spoke with Dr. Chiang he stated pt came in for a physical and had elevated BP.      noticed pt was not taking the norvasc 10mg, and the benicar 40-25mg.     Pt is schedule for a ablation on 2/19/2024. And  is concern the elevated BP could prevent pt procedure.     Would you like to make a med change?

## 2024-02-06 NOTE — PROGRESS NOTES
1 tablet by mouth daily TAKE 1 TABLET BY MOUTH EVERY DAY Yes Harpreet Chiang DO       CareTeam (Including outside providers/suppliers regularly involved in providing care):   Patient Care Team:  Harpreet Chiang DO as PCP - General  Harpreet Chiang DO as PCP - Empaneled Provider  Jcarlos Avalos MD as Consulting Physician     Reviewed and updated this visit:  Tobacco  Allergies  Meds  Med Hx  Surg Hx  Soc Hx  Fam Hx         Otherwise normal routine physical exam reviewed his labs answered all his questions his blood pressure is elevated reviewed Dr. Barnett his cardiologist note apparently he is on medications that are on his list including Norvasc Benicar?  Call his office and talk to Kedar and she is going to get in touch with Dr. Barnett his blood pressure is 140/100 and his set up next week to have an EP study.  Will also order labs for his chronic medical conditions and will piggyback Dr. Barnett lab work including a BMP/magnesium along with the rest of his labs for his physical that way Dr. Barnett can see it awaiting to hear what we should do with his blood pressure and who is in control of his blood pressure management..I have spent a total of 8-15 minutes assessing, reviewing, and discussing the depression screening with patient in office today.  Also find out from cardiology what is going on I will call patient back and let him know at the end of the day if I do not hear anything I will let him know what is going on

## 2024-02-07 RX ORDER — AMLODIPINE BESYLATE 10 MG/1
10 TABLET ORAL DAILY
Qty: 30 TABLET | Refills: 3 | Status: SHIPPED | OUTPATIENT
Start: 2024-02-07

## 2024-02-07 NOTE — TELEPHONE ENCOUNTER
Spoke with pt and review  note. Pt v/u. Called  and left a voicemail; informing him of pt new med change and BP check.     RX as seen below  Requested Prescriptions     Pending Prescriptions Disp Refills    amLODIPine (NORVASC) 10 MG tablet 30 tablet 3     Sig: Take 1 tablet by mouth daily

## 2024-02-07 NOTE — TELEPHONE ENCOUNTER
Spoke with pt confirmed his list of meds. Pt in-fact was not taking the Norvasc 10mg Benicar 40-25mg.     Would you like me to write a scrip for him for this dose?

## 2024-02-08 ENCOUNTER — TELEPHONE (OUTPATIENT)
Age: 64
End: 2024-02-08

## 2024-02-08 ENCOUNTER — CLINICAL DOCUMENTATION (OUTPATIENT)
Dept: FAMILY MEDICINE CLINIC | Facility: CLINIC | Age: 64
End: 2024-02-08

## 2024-02-08 NOTE — PROGRESS NOTES
Sent through in basket     \"Jignesh I spoke with Mr Thomson and per Dr Chiang he needs to schedule a follow up with Dr aBrnett for his BP. Patient is scheduled for labs for Dr Chiang on Monday Feb 12th. Patient needs to know if he can be worked in with you guys that day.   If you can please call patient and set up an appt. \"

## 2024-02-12 ENCOUNTER — TELEPHONE (OUTPATIENT)
Dept: FAMILY MEDICINE CLINIC | Facility: CLINIC | Age: 64
End: 2024-02-12

## 2024-02-12 ENCOUNTER — TELEPHONE (OUTPATIENT)
Age: 64
End: 2024-02-12

## 2024-02-12 ENCOUNTER — NURSE ONLY (OUTPATIENT)
Dept: FAMILY MEDICINE CLINIC | Facility: CLINIC | Age: 64
End: 2024-02-12
Payer: MEDICARE

## 2024-02-12 ENCOUNTER — NURSE ONLY (OUTPATIENT)
Age: 64
End: 2024-02-12

## 2024-02-12 VITALS — SYSTOLIC BLOOD PRESSURE: 148 MMHG | DIASTOLIC BLOOD PRESSURE: 96 MMHG

## 2024-02-12 DIAGNOSIS — I10 ESSENTIAL (PRIMARY) HYPERTENSION: ICD-10-CM

## 2024-02-12 DIAGNOSIS — E83.42 HYPOMAGNESEMIA: ICD-10-CM

## 2024-02-12 DIAGNOSIS — E78.01 FAMILIAL HYPERCHOLESTEROLEMIA: Primary | ICD-10-CM

## 2024-02-12 DIAGNOSIS — E78.01 FAMILIAL HYPERCHOLESTEROLEMIA: ICD-10-CM

## 2024-02-12 DIAGNOSIS — Z12.5 SPECIAL SCREENING, PROSTATE CANCER: ICD-10-CM

## 2024-02-12 DIAGNOSIS — Z00.00 ROUTINE GENERAL MEDICAL EXAMINATION AT A HEALTH CARE FACILITY: ICD-10-CM

## 2024-02-12 LAB
ALBUMIN SERPL-MCNC: 4.1 G/DL (ref 3.2–4.6)
ALBUMIN/GLOB SERPL: 1.2 (ref 0.4–1.6)
ALP SERPL-CCNC: 129 U/L (ref 50–136)
ALT SERPL-CCNC: 34 U/L (ref 12–65)
ANION GAP SERPL CALC-SCNC: 6 MMOL/L (ref 2–11)
AST SERPL-CCNC: 11 U/L (ref 15–37)
BASOPHILS # BLD: 0.1 K/UL (ref 0–0.2)
BASOPHILS NFR BLD: 0 % (ref 0–2)
BILIRUB SERPL-MCNC: 0.4 MG/DL (ref 0.2–1.1)
BUN SERPL-MCNC: 32 MG/DL (ref 8–23)
CALCIUM SERPL-MCNC: 10.4 MG/DL (ref 8.3–10.4)
CHLORIDE SERPL-SCNC: 107 MMOL/L (ref 103–113)
CHOLEST SERPL-MCNC: 156 MG/DL
CO2 SERPL-SCNC: 24 MMOL/L (ref 21–32)
CREAT SERPL-MCNC: 1.7 MG/DL (ref 0.8–1.5)
DIFFERENTIAL METHOD BLD: ABNORMAL
EOSINOPHIL # BLD: 0 K/UL (ref 0–0.8)
EOSINOPHIL NFR BLD: 0 % (ref 0.5–7.8)
ERYTHROCYTE [DISTWIDTH] IN BLOOD BY AUTOMATED COUNT: 13.5 % (ref 11.9–14.6)
GLOBULIN SER CALC-MCNC: 3.4 G/DL (ref 2.8–4.5)
GLUCOSE SERPL-MCNC: 120 MG/DL (ref 65–100)
HCT VFR BLD AUTO: 51.7 % (ref 41.1–50.3)
HCV AB SER QL: NONREACTIVE
HDLC SERPL-MCNC: 48 MG/DL (ref 40–60)
HDLC SERPL: 3.3
HGB BLD-MCNC: 17.1 G/DL (ref 13.6–17.2)
HIV 1+2 AB+HIV1 P24 AG SERPL QL IA: NONREACTIVE
HIV 1/2 RESULT COMMENT: NORMAL
IMM GRANULOCYTES # BLD AUTO: 0.1 K/UL (ref 0–0.5)
IMM GRANULOCYTES NFR BLD AUTO: 1 % (ref 0–5)
LDLC SERPL CALC-MCNC: 89.2 MG/DL
LYMPHOCYTES # BLD: 3.8 K/UL (ref 0.5–4.6)
LYMPHOCYTES NFR BLD: 23 % (ref 13–44)
MAGNESIUM SERPL-MCNC: 2.3 MG/DL (ref 1.8–2.4)
MCH RBC QN AUTO: 28.4 PG (ref 26.1–32.9)
MCHC RBC AUTO-ENTMCNC: 33.1 G/DL (ref 31.4–35)
MCV RBC AUTO: 85.9 FL (ref 82–102)
MONOCYTES # BLD: 0.9 K/UL (ref 0.1–1.3)
MONOCYTES NFR BLD: 6 % (ref 4–12)
NEUTS SEG # BLD: 11.7 K/UL (ref 1.7–8.2)
NEUTS SEG NFR BLD: 70 % (ref 43–78)
NRBC # BLD: 0 K/UL (ref 0–0.2)
PLATELET # BLD AUTO: 517 K/UL (ref 150–450)
PMV BLD AUTO: 9.4 FL (ref 9.4–12.3)
POTASSIUM SERPL-SCNC: 4.5 MMOL/L (ref 3.5–5.1)
PROT SERPL-MCNC: 7.5 G/DL (ref 6.3–8.2)
PSA SERPL-MCNC: 5.6 NG/ML
RBC # BLD AUTO: 6.02 M/UL (ref 4.23–5.6)
SODIUM SERPL-SCNC: 137 MMOL/L (ref 136–146)
TRIGL SERPL-MCNC: 94 MG/DL (ref 35–150)
TSH, 3RD GENERATION: 1.46 UIU/ML (ref 0.36–3.74)
VLDLC SERPL CALC-MCNC: 18.8 MG/DL (ref 6–23)
WBC # BLD AUTO: 16.5 K/UL (ref 4.3–11.1)

## 2024-02-12 PROCEDURE — 36415 COLL VENOUS BLD VENIPUNCTURE: CPT | Performed by: FAMILY MEDICINE

## 2024-02-12 NOTE — PROGRESS NOTES
Patient is in clinic today for BP check after med changes. Patient was seen by PCP who noticed elevated BP and patient was not taking all his medications. BP today is 148/96 home monitor about 10 points higher systolic. Recent home readings of 17X/ 122.     Staff message sent to Dr. Barnett who is currently at the hospital.     Spoke with Dr. Espinal who stated that it looks like he probably needs some adjustments but nothing that is an emergency at this time. Advised it would be okay for patient to go ahead and leave and call back with Dr. Barnett recommendations.    Informed patient we would be giving him a call later today to advise of plan. Voiced understanding.

## 2024-02-12 NOTE — TELEPHONE ENCOUNTER
----- Message from Antwon Barnett MD sent at 2/12/2024 10:51 AM EST -----  Regarding: RE: Blood Pressure  Stay on meds, follow up with PCP in next few weeks for ongoing BP management    ----- Message -----  From: Cierra Montes MA  Sent: 2/12/2024   9:28 AM EST  To: Antwon Barnett MD  Subject: Blood Pressure                                   Patient is in clinic today for BP check after med changes. Patient was seen by PCP who noticed elevated BP and patient was not taking all his medications. BP today is 148/96 home monitor about 10 points higher systolic. Recent home readings of 17X/ 122. Please advise.

## 2024-02-12 NOTE — TELEPHONE ENCOUNTER
Per Dr Chiang  Cardiology will need to management Mr. Laureano's blood pressure paatient is aware of PCP decision regard his blood pressure he will call cardiology .

## 2024-02-15 ENCOUNTER — TELEPHONE (OUTPATIENT)
Age: 64
End: 2024-02-15

## 2024-02-15 NOTE — PROGRESS NOTES
Patient pre-assessment complete for Afib Ablation with Dr. Barnett scheduled for 24 at 0730, arrival time 0600. Patient verified using . Patient instructed to bring a list of home medications on the day of procedure. NPO status reinforced. Patient instructed to follow EP Information Sheet given at appointment. Patient verbalizes understanding of all instructions & denies any questions at this time.

## 2024-02-15 NOTE — TELEPHONE ENCOUNTER
Eduin Guardado MD Brizendine, Kyler, LPN  Keep the appointment for ablation   Continue meds as adjusted by Dr. Adams, minimize sodium, alcohol, and caffeine   Rest over the weekend   Book him to see me in 3 to 4 weeks         Spoke with pt and review  note. Pt v/u. Pt is schedule for OV ay 1:15pm in [place].

## 2024-02-19 ENCOUNTER — HOSPITAL ENCOUNTER (OUTPATIENT)
Age: 64
Setting detail: OUTPATIENT SURGERY
Discharge: HOME | End: 2024-02-19
Attending: INTERNAL MEDICINE | Admitting: INTERNAL MEDICINE
Payer: MEDICARE

## 2024-02-19 ENCOUNTER — APPOINTMENT (OUTPATIENT)
Dept: CARDIAC CATH/INVASIVE PROCEDURES | Age: 64
End: 2024-02-19
Attending: INTERNAL MEDICINE
Payer: MEDICARE

## 2024-02-19 ENCOUNTER — ANESTHESIA (OUTPATIENT)
Dept: CARDIAC CATH/INVASIVE PROCEDURES | Age: 64
End: 2024-02-19
Payer: MEDICARE

## 2024-02-19 ENCOUNTER — ANESTHESIA EVENT (OUTPATIENT)
Dept: CARDIAC CATH/INVASIVE PROCEDURES | Age: 64
End: 2024-02-19
Payer: MEDICARE

## 2024-02-19 VITALS
BODY MASS INDEX: 33.86 KG/M2 | TEMPERATURE: 97.9 F | OXYGEN SATURATION: 100 % | WEIGHT: 250 LBS | SYSTOLIC BLOOD PRESSURE: 133 MMHG | DIASTOLIC BLOOD PRESSURE: 95 MMHG | HEART RATE: 98 BPM | HEIGHT: 72 IN | RESPIRATION RATE: 18 BRPM

## 2024-02-19 DIAGNOSIS — I48.19 PERSISTENT ATRIAL FIBRILLATION (HCC): ICD-10-CM

## 2024-02-19 LAB
ACT BLD: 320 SECS (ref 70–128)
ECHO BSA: 2.4 M2
ECHO BSA: 2.4 M2
EKG ATRIAL RATE: 92 BPM
EKG DIAGNOSIS: NORMAL
EKG P AXIS: 51 DEGREES
EKG P-R INTERVAL: 164 MS
EKG Q-T INTERVAL: 366 MS
EKG QRS DURATION: 104 MS
EKG QTC CALCULATION (BAZETT): 452 MS
EKG R AXIS: 17 DEGREES
EKG T AXIS: 95 DEGREES
EKG VENTRICULAR RATE: 92 BPM

## 2024-02-19 PROCEDURE — C1730 CATH, EP, 19 OR FEW ELECT: HCPCS | Performed by: INTERNAL MEDICINE

## 2024-02-19 PROCEDURE — 93005 ELECTROCARDIOGRAM TRACING: CPT | Performed by: INTERNAL MEDICINE

## 2024-02-19 PROCEDURE — 3700000001 HC ADD 15 MINUTES (ANESTHESIA): Performed by: INTERNAL MEDICINE

## 2024-02-19 PROCEDURE — 2720000010 HC SURG SUPPLY STERILE: Performed by: INTERNAL MEDICINE

## 2024-02-19 PROCEDURE — 93312 ECHO TRANSESOPHAGEAL: CPT | Performed by: INTERNAL MEDICINE

## 2024-02-19 PROCEDURE — 93656 COMPRE EP EVAL ABLTJ ATR FIB: CPT | Performed by: INTERNAL MEDICINE

## 2024-02-19 PROCEDURE — 93320 DOPPLER ECHO COMPLETE: CPT | Performed by: INTERNAL MEDICINE

## 2024-02-19 PROCEDURE — 3700000000 HC ANESTHESIA ATTENDED CARE: Performed by: INTERNAL MEDICINE

## 2024-02-19 PROCEDURE — C1893 INTRO/SHEATH, FIXED,NON-PEEL: HCPCS | Performed by: INTERNAL MEDICINE

## 2024-02-19 PROCEDURE — 6360000002 HC RX W HCPCS: Performed by: INTERNAL MEDICINE

## 2024-02-19 PROCEDURE — 6360000002 HC RX W HCPCS: Performed by: NURSE ANESTHETIST, CERTIFIED REGISTERED

## 2024-02-19 PROCEDURE — C1759 CATH, INTRA ECHOCARDIOGRAPHY: HCPCS | Performed by: INTERNAL MEDICINE

## 2024-02-19 PROCEDURE — 93623 PRGRMD STIMJ&PACG IV RX NFS: CPT | Performed by: INTERNAL MEDICINE

## 2024-02-19 PROCEDURE — 2580000003 HC RX 258: Performed by: NURSE ANESTHETIST, CERTIFIED REGISTERED

## 2024-02-19 PROCEDURE — C1894 INTRO/SHEATH, NON-LASER: HCPCS | Performed by: INTERNAL MEDICINE

## 2024-02-19 PROCEDURE — C1760 CLOSURE DEV, VASC: HCPCS | Performed by: INTERNAL MEDICINE

## 2024-02-19 PROCEDURE — C1732 CATH, EP, DIAG/ABL, 3D/VECT: HCPCS | Performed by: INTERNAL MEDICINE

## 2024-02-19 PROCEDURE — 93325 DOPPLER ECHO COLOR FLOW MAPG: CPT | Performed by: INTERNAL MEDICINE

## 2024-02-19 PROCEDURE — 2500000003 HC RX 250 WO HCPCS: Performed by: NURSE ANESTHETIST, CERTIFIED REGISTERED

## 2024-02-19 PROCEDURE — 93655 ICAR CATH ABLTJ DSCRT ARRHYT: CPT | Performed by: INTERNAL MEDICINE

## 2024-02-19 PROCEDURE — 7100000001 HC PACU RECOVERY - ADDTL 15 MIN: Performed by: INTERNAL MEDICINE

## 2024-02-19 PROCEDURE — 85347 COAGULATION TIME ACTIVATED: CPT

## 2024-02-19 PROCEDURE — 7100000000 HC PACU RECOVERY - FIRST 15 MIN: Performed by: INTERNAL MEDICINE

## 2024-02-19 PROCEDURE — 93312 ECHO TRANSESOPHAGEAL: CPT

## 2024-02-19 PROCEDURE — 93622 COMP EP EVAL L VENTR PAC&REC: CPT | Performed by: INTERNAL MEDICINE

## 2024-02-19 PROCEDURE — 2709999900 HC NON-CHARGEABLE SUPPLY: Performed by: INTERNAL MEDICINE

## 2024-02-19 PROCEDURE — 93010 ELECTROCARDIOGRAM REPORT: CPT | Performed by: INTERNAL MEDICINE

## 2024-02-19 RX ORDER — EPHEDRINE SULFATE/0.9% NACL/PF 50 MG/5 ML
SYRINGE (ML) INTRAVENOUS PRN
Status: DISCONTINUED | OUTPATIENT
Start: 2024-02-19 | End: 2024-02-19 | Stop reason: SDUPTHER

## 2024-02-19 RX ORDER — ONDANSETRON 2 MG/ML
INJECTION INTRAMUSCULAR; INTRAVENOUS PRN
Status: DISCONTINUED | OUTPATIENT
Start: 2024-02-19 | End: 2024-02-19 | Stop reason: SDUPTHER

## 2024-02-19 RX ORDER — FENTANYL CITRATE 50 UG/ML
100 INJECTION, SOLUTION INTRAMUSCULAR; INTRAVENOUS
Status: CANCELLED | OUTPATIENT
Start: 2024-02-19 | End: 2024-02-20

## 2024-02-19 RX ORDER — VASOPRESSIN 20 U/ML
INJECTION PARENTERAL PRN
Status: DISCONTINUED | OUTPATIENT
Start: 2024-02-19 | End: 2024-02-19 | Stop reason: SDUPTHER

## 2024-02-19 RX ORDER — PANTOPRAZOLE SODIUM 40 MG/1
40 TABLET, DELAYED RELEASE ORAL
Qty: 60 TABLET | Refills: 0 | Status: SHIPPED | OUTPATIENT
Start: 2024-02-19

## 2024-02-19 RX ORDER — HEPARIN SODIUM 1000 [USP'U]/ML
INJECTION, SOLUTION INTRAVENOUS; SUBCUTANEOUS PRN
Status: DISCONTINUED | OUTPATIENT
Start: 2024-02-19 | End: 2024-02-19 | Stop reason: SDUPTHER

## 2024-02-19 RX ORDER — PROPOFOL 10 MG/ML
INJECTION, EMULSION INTRAVENOUS PRN
Status: DISCONTINUED | OUTPATIENT
Start: 2024-02-19 | End: 2024-02-19 | Stop reason: SDUPTHER

## 2024-02-19 RX ORDER — PROTAMINE SULFATE 10 MG/ML
INJECTION, SOLUTION INTRAVENOUS PRN
Status: DISCONTINUED | OUTPATIENT
Start: 2024-02-19 | End: 2024-02-19 | Stop reason: SDUPTHER

## 2024-02-19 RX ORDER — SODIUM CHLORIDE 0.9 % (FLUSH) 0.9 %
5-40 SYRINGE (ML) INJECTION EVERY 12 HOURS SCHEDULED
Status: CANCELLED | OUTPATIENT
Start: 2024-02-19

## 2024-02-19 RX ORDER — DIPHENHYDRAMINE HYDROCHLORIDE 50 MG/ML
12.5 INJECTION INTRAMUSCULAR; INTRAVENOUS
Status: DISCONTINUED | OUTPATIENT
Start: 2024-02-19 | End: 2024-02-19 | Stop reason: HOSPADM

## 2024-02-19 RX ORDER — DEXAMETHASONE SODIUM PHOSPHATE 4 MG/ML
INJECTION, SOLUTION INTRA-ARTICULAR; INTRALESIONAL; INTRAMUSCULAR; INTRAVENOUS; SOFT TISSUE PRN
Status: DISCONTINUED | OUTPATIENT
Start: 2024-02-19 | End: 2024-02-19 | Stop reason: SDUPTHER

## 2024-02-19 RX ORDER — DEXMEDETOMIDINE HYDROCHLORIDE 100 UG/ML
INJECTION, SOLUTION INTRAVENOUS PRN
Status: DISCONTINUED | OUTPATIENT
Start: 2024-02-19 | End: 2024-02-19 | Stop reason: SDUPTHER

## 2024-02-19 RX ORDER — SUCRALFATE 1 G/1
1 TABLET ORAL 4 TIMES DAILY
Qty: 120 TABLET | Refills: 0 | Status: SHIPPED | OUTPATIENT
Start: 2024-02-19

## 2024-02-19 RX ORDER — SODIUM CHLORIDE 9 MG/ML
INJECTION, SOLUTION INTRAVENOUS PRN
Status: CANCELLED | OUTPATIENT
Start: 2024-02-19

## 2024-02-19 RX ORDER — ONDANSETRON 2 MG/ML
4 INJECTION INTRAMUSCULAR; INTRAVENOUS
Status: DISCONTINUED | OUTPATIENT
Start: 2024-02-19 | End: 2024-02-19 | Stop reason: HOSPADM

## 2024-02-19 RX ORDER — HEPARIN SODIUM 200 [USP'U]/100ML
INJECTION, SOLUTION INTRAVENOUS CONTINUOUS PRN
Status: COMPLETED | OUTPATIENT
Start: 2024-02-19 | End: 2024-02-19

## 2024-02-19 RX ORDER — ACETAMINOPHEN 500 MG
1000 TABLET ORAL ONCE
Status: CANCELLED | OUTPATIENT
Start: 2024-02-19 | End: 2024-02-19

## 2024-02-19 RX ORDER — COLCHICINE 0.6 MG/1
0.6 TABLET ORAL DAILY
Qty: 30 TABLET | Refills: 0 | Status: SHIPPED | OUTPATIENT
Start: 2024-02-19

## 2024-02-19 RX ORDER — LIDOCAINE HYDROCHLORIDE 20 MG/ML
INJECTION, SOLUTION EPIDURAL; INFILTRATION; INTRACAUDAL; PERINEURAL PRN
Status: DISCONTINUED | OUTPATIENT
Start: 2024-02-19 | End: 2024-02-19 | Stop reason: SDUPTHER

## 2024-02-19 RX ORDER — SODIUM CHLORIDE, SODIUM LACTATE, POTASSIUM CHLORIDE, CALCIUM CHLORIDE 600; 310; 30; 20 MG/100ML; MG/100ML; MG/100ML; MG/100ML
INJECTION, SOLUTION INTRAVENOUS CONTINUOUS PRN
Status: DISCONTINUED | OUTPATIENT
Start: 2024-02-19 | End: 2024-02-19 | Stop reason: SDUPTHER

## 2024-02-19 RX ORDER — HEPARIN SODIUM 10000 [USP'U]/100ML
INJECTION, SOLUTION INTRAVENOUS CONTINUOUS PRN
Status: DISCONTINUED | OUTPATIENT
Start: 2024-02-19 | End: 2024-02-19 | Stop reason: SDUPTHER

## 2024-02-19 RX ORDER — ROCURONIUM BROMIDE 10 MG/ML
INJECTION, SOLUTION INTRAVENOUS PRN
Status: DISCONTINUED | OUTPATIENT
Start: 2024-02-19 | End: 2024-02-19 | Stop reason: SDUPTHER

## 2024-02-19 RX ORDER — HYDROMORPHONE HYDROCHLORIDE 2 MG/ML
0.5 INJECTION, SOLUTION INTRAMUSCULAR; INTRAVENOUS; SUBCUTANEOUS EVERY 5 MIN PRN
Status: DISCONTINUED | OUTPATIENT
Start: 2024-02-19 | End: 2024-02-19 | Stop reason: HOSPADM

## 2024-02-19 RX ORDER — ADENOSINE 3 MG/ML
INJECTION, SOLUTION INTRAVENOUS PRN
Status: DISCONTINUED | OUTPATIENT
Start: 2024-02-19 | End: 2024-02-19 | Stop reason: HOSPADM

## 2024-02-19 RX ORDER — SODIUM CHLORIDE 0.9 % (FLUSH) 0.9 %
5-40 SYRINGE (ML) INJECTION PRN
Status: CANCELLED | OUTPATIENT
Start: 2024-02-19

## 2024-02-19 RX ORDER — OXYCODONE HYDROCHLORIDE 5 MG/1
5 TABLET ORAL
Status: DISCONTINUED | OUTPATIENT
Start: 2024-02-19 | End: 2024-02-19 | Stop reason: HOSPADM

## 2024-02-19 RX ORDER — MIDAZOLAM HYDROCHLORIDE 2 MG/2ML
2 INJECTION, SOLUTION INTRAMUSCULAR; INTRAVENOUS
Status: CANCELLED | OUTPATIENT
Start: 2024-02-19 | End: 2024-02-20

## 2024-02-19 RX ORDER — SODIUM CHLORIDE, SODIUM LACTATE, POTASSIUM CHLORIDE, CALCIUM CHLORIDE 600; 310; 30; 20 MG/100ML; MG/100ML; MG/100ML; MG/100ML
INJECTION, SOLUTION INTRAVENOUS CONTINUOUS
Status: CANCELLED | OUTPATIENT
Start: 2024-02-19

## 2024-02-19 RX ADMIN — PHENYLEPHRINE HYDROCHLORIDE 100 MCG: 10 INJECTION INTRAVENOUS at 07:51

## 2024-02-19 RX ADMIN — DEXAMETHASONE SODIUM PHOSPHATE 4 MG: 4 INJECTION, SOLUTION INTRAMUSCULAR; INTRAVENOUS at 07:30

## 2024-02-19 RX ADMIN — ROCURONIUM BROMIDE 45 MG: 10 INJECTION, SOLUTION INTRAVENOUS at 07:22

## 2024-02-19 RX ADMIN — DEXMEDETOMIDINE 20 MCG: 100 INJECTION, SOLUTION, CONCENTRATE INTRAVENOUS at 08:35

## 2024-02-19 RX ADMIN — ROCURONIUM BROMIDE 5 MG: 10 INJECTION, SOLUTION INTRAVENOUS at 07:58

## 2024-02-19 RX ADMIN — DEXMEDETOMIDINE 20 MCG: 100 INJECTION, SOLUTION, CONCENTRATE INTRAVENOUS at 08:47

## 2024-02-19 RX ADMIN — PROTAMINE SULFATE 100 MG: 10 INJECTION, SOLUTION INTRAVENOUS at 08:38

## 2024-02-19 RX ADMIN — PHENYLEPHRINE HYDROCHLORIDE 200 MCG: 10 INJECTION INTRAVENOUS at 08:27

## 2024-02-19 RX ADMIN — HEPARIN SODIUM 9000 UNITS: 1000 INJECTION INTRAVENOUS; SUBCUTANEOUS at 07:52

## 2024-02-19 RX ADMIN — PHENYLEPHRINE HYDROCHLORIDE 100 MCG: 10 INJECTION INTRAVENOUS at 07:55

## 2024-02-19 RX ADMIN — DEXMEDETOMIDINE 20 MCG: 100 INJECTION, SOLUTION, CONCENTRATE INTRAVENOUS at 08:43

## 2024-02-19 RX ADMIN — ONDANSETRON 4 MG: 2 INJECTION INTRAMUSCULAR; INTRAVENOUS at 07:30

## 2024-02-19 RX ADMIN — DEXMEDETOMIDINE 20 MCG: 100 INJECTION, SOLUTION, CONCENTRATE INTRAVENOUS at 08:39

## 2024-02-19 RX ADMIN — PHENYLEPHRINE HYDROCHLORIDE 50 MCG: 10 INJECTION INTRAVENOUS at 07:49

## 2024-02-19 RX ADMIN — VASOPRESSIN 0.5 UNITS: 20 INJECTION INTRAVENOUS at 08:00

## 2024-02-19 RX ADMIN — Medication 5 MG: at 07:57

## 2024-02-19 RX ADMIN — PHENYLEPHRINE HYDROCHLORIDE 200 MCG: 10 INJECTION INTRAVENOUS at 07:45

## 2024-02-19 RX ADMIN — LIDOCAINE HYDROCHLORIDE 60 MG: 20 INJECTION, SOLUTION EPIDURAL; INFILTRATION; INTRACAUDAL; PERINEURAL at 07:22

## 2024-02-19 RX ADMIN — PHENYLEPHRINE HYDROCHLORIDE 100 MCG: 10 INJECTION INTRAVENOUS at 07:57

## 2024-02-19 RX ADMIN — HEPARIN SODIUM 9000 UNITS: 1000 INJECTION INTRAVENOUS; SUBCUTANEOUS at 07:46

## 2024-02-19 RX ADMIN — HEPARIN SODIUM 40 UNITS/KG/HR: 10000 INJECTION, SOLUTION INTRAVENOUS at 07:52

## 2024-02-19 RX ADMIN — ROCURONIUM BROMIDE 10 MG: 10 INJECTION, SOLUTION INTRAVENOUS at 08:12

## 2024-02-19 RX ADMIN — SUGAMMADEX 400 MG: 100 INJECTION, SOLUTION INTRAVENOUS at 08:41

## 2024-02-19 RX ADMIN — Medication 10 MG: at 07:51

## 2024-02-19 RX ADMIN — ROCURONIUM BROMIDE 10 MG: 10 INJECTION, SOLUTION INTRAVENOUS at 08:03

## 2024-02-19 RX ADMIN — PROPOFOL 180 MG: 10 INJECTION, EMULSION INTRAVENOUS at 07:22

## 2024-02-19 RX ADMIN — VASOPRESSIN 0.5 UNITS: 20 INJECTION INTRAVENOUS at 08:29

## 2024-02-19 RX ADMIN — Medication 10 MG: at 07:41

## 2024-02-19 RX ADMIN — SODIUM CHLORIDE, SODIUM LACTATE, POTASSIUM CHLORIDE, AND CALCIUM CHLORIDE: 600; 310; 30; 20 INJECTION, SOLUTION INTRAVENOUS at 07:16

## 2024-02-19 RX ADMIN — PHENYLEPHRINE HYDROCHLORIDE 150 MCG: 10 INJECTION INTRAVENOUS at 07:40

## 2024-02-19 ASSESSMENT — LIFESTYLE VARIABLES: SMOKING_STATUS: 1

## 2024-02-19 NOTE — DISCHARGE INSTRUCTIONS
if:    You passed out (lost consciousness).     You have symptoms of a heart attack. These may include:  Chest pain or pressure, or a strange feeling in the chest.  Sweating.  Shortness of breath.  Nausea or vomiting.  Pain, pressure, or a strange feeling in the back, neck, jaw, or upper belly, or in one or both shoulders or arms.  Lightheadedness or sudden weakness.  A fast or irregular heartbeat.  After you call 911, the  may tell you to chew 1 adult-strength or 2 to 4 low-dose aspirin. Wait for an ambulance. Do not try to drive yourself.     You have symptoms of a stroke. These may include:  Sudden numbness, tingling, weakness, or loss of movement in your face, arm, or leg, especially on only one side of your body.  Sudden vision changes.  Sudden trouble speaking.  Sudden confusion or trouble understanding simple statements.  Sudden problems with walking or balance.  A sudden, severe headache that is different from past headaches.   Call your doctor now or seek immediate medical care if:    You are bleeding from the area where the catheter was put in your blood vessel.     You have a fast-growing, painful lump at the catheter site.     You have signs of infection, such as:  Increased pain, swelling, warmth, or redness.  Red streaks leading from the catheter site.  Pus draining from the catheter site.  A fever.     Your leg, arm, or hand is painful, looks blue, or feels cold, numb, or tingly.   Watch closely for any changes in your health, and be sure to contact your doctor if you have any problems.  Current as of: June 25, 2023               Content Version: 13.9  © 2006-2023 [employer].   Care instructions adapted under license by [employer]. If you have questions about a medical condition or this instruction, always ask your healthcare professional. [employer] disclaims any warranty or liability for your use of this information.

## 2024-02-19 NOTE — H&P
[facility] Cardiology/Electrophyiology Consult                Date of  Admission: 2/19/2024  6:06 AM     CC/Reason for admission: afib ablation     Anam Thomson is a 63 y.o. male admitted for Persistent atrial fibrillation (HCC) [I48.19].      63 y.o. male with a past medical and cardiac history significant for HTN, HLD, CKDIII, morbid obesity, persistent atrial fibrillation s/p LORENA/DCCV 3/2022 with recent admission with afib s/p repeat LORENA/DCCV and presents for scheduled ablation for afib. Pt has had severe ER visits and admissions and DCCVs over the past few months. He has been having recurrent AF on flecainide.      Cardiac PMH: (Old records have been reviewed and summarized below)    Patient Active Problem List   Diagnosis    Tobacco abuse    Atrial fibrillation (HCC)    Cellulitis of foot    Obesity, unspecified    CKD (chronic kidney disease) stage 3, GFR 30-59 ml/min (HCC)    Atrial fibrillation with RVR (HCC)    HLD (hyperlipidemia)    Hypertension    Hypokalemia    Hypomagnesemia    Secondary hypercoagulable state (HCC)       Past Medical History:   Diagnosis Date    Atrial fibrillation with rapid ventricular response (HCC) 9/5/2018    Hypercholesterolemia     Hypertension 2007    Kidney disease     Pyoderma gangrenosum 9/5/2018      No past surgical history on file.  No Known Allergies   Family History   Problem Relation Age of Onset    Hypertension Mother     Stroke Father     Osteoarthritis Maternal Grandmother     Osteoarthritis Maternal Grandfather     Hypertension Maternal Grandfather     Stroke Maternal Grandfather     Cancer Mother     Diabetes Paternal Grandmother     Osteoarthritis Paternal Grandfather     Heart Disease Paternal Grandfather     Stroke Paternal Grandfather     Asthma Mother     Osteoarthritis Mother     Osteoarthritis Paternal Grandmother         No current facility-administered medications for this encounter.     Physical Exam  There were no vitals filed for this

## 2024-02-19 NOTE — PROCEDURES
used to pace at high output to try to localize the right phrenic nerve and map its location prior to ablation. Adenosine was injected (6-12 mg) to demonstrate the lack of early reconnection with the Octaray in the PVs. There was no early reconnection with adenosine. The ablation catheter was inserted into the LV, documented LV electrograms and was used to pace the LV for the EP study and for rescue pacing during adenosine infusion.  Post PVI, the Octaray was used to perform a second LA voltage map post ablation to demonstrate pulmonary vein isolation and post ablation voltage as pictured below.     Baseline LA Voltage Map      Post Ablation LA Voltage Map      Cavotricuspid Isthmus ablation (right atrial flutter)  Linear ablation across the cavo-tricuspid isthmus was performed starting with 1:2 A:V EGM’s along the isthmus at 6pm OJ.  The local electrogram activation sequence, differential pacing maneuvers and electrogram timing was used to demonstrate bidirectional block along the cavotricuspid isthmus.     Post-Ablation trans-isthmus time: 153 msec    Next, baseline recordings and a diagnostic EP study was performed.    The coronary sinus multipolar catheter was used to pace the left atrium during the EP study. The LA CS electrograms were documented and interpreted during the procedure. A comprehensive EP study was performed with 1:1 AV decremental pacing, atrial extrastimuli and ventricular pacing to assess retrograde conduction. The patient did not have sustained slow pathway conduction or evidence of an accessory pathway. Ventricular pacing revealed retrograde AV conduction which was concentric and decremental.    At the completion of the ablation and EPS, all long sheaths were exchanged for short 8 Fr sheaths and 100 units of Protamine was delivered to reverse the effect of heparin.      Four VASCADE MVP devices were used to close the venotomy sites. The MVP tools were advanced into the 8 Fr and 10 Fr sheaths,

## 2024-02-19 NOTE — ANESTHESIA POSTPROCEDURE EVALUATION
Department of Anesthesiology  Postprocedure Note    Patient: Anam Thomson  MRN: [id number]  YOB: 1960  Date of evaluation: 2/19/2024    Procedure Summary       Date: 02/19/24 Room / Location: [facility] 2 ALL EVENTS / [facility] CARDIAC CATH LAB    Anesthesia Start: 0710 Anesthesia Stop: 0858    Procedure: Ablation A-fib w complete ep study Diagnosis:       Persistent atrial fibrillation (HCC)      (Persistent atrial fibrillation (HCC) [I48.19])    Providers: Antwon Barnett MD Responsible Provider: Antwon Higgins MD    Anesthesia Type: General ASA Status: 3            Anesthesia Type: General    Matthew Phase I: Matthew Score: 9    Matthew Phase II:      Anesthesia Post Evaluation    Patient location during evaluation: PACU  Patient participation: complete - patient participated  Level of consciousness: awake and alert  Airway patency: patent  Nausea & Vomiting: no nausea and no vomiting  Cardiovascular status: hemodynamically stable  Respiratory status: acceptable, nonlabored ventilation and spontaneous ventilation  Hydration status: euvolemic  Comments: /70   Pulse 83   Temp 97.8 °F (36.6 °C) (Skin)   Resp 18   Ht 1.829 m (6')   Wt 113.4 kg (250 lb)   SpO2 93%   BMI 33.91 kg/m²     Multimodal analgesia pain management approach  Pain management: adequate and satisfactory to patient    There were no known notable events for this encounter.

## 2024-02-19 NOTE — PROGRESS NOTES
Patient ambulated for second time. No bleeding or hematoma noted to bilateral groins.     Discharge instructions reviewed with patient including post ablation care and medications that will need to be picked up from pharmacy and started ASAP. INT removed with cath intact.

## 2024-02-19 NOTE — PERIOP NOTE
TRANSFER - OUT REPORT:    Verbal report given to ARTURO Cummins on Anam Thomson  being transferred to cath lab recovery for routine progression of patient care       Report consisted of patient's Situation, Background, Assessment and   Recommendations(SBAR).     Information from the following report(s) Nurse Handoff Report, ED SBAR, Adult Overview, and Cardiac Rhythm NSR  was reviewed with the receiving nurse.           Lines:   Peripheral IV 02/19/24 Right Hand (Active)   Site Assessment Clean, dry & intact 02/19/24 0901   Line Status Flushed 02/19/24 0901   Phlebitis Assessment No symptoms 02/19/24 0901   Infiltration Assessment 0 02/19/24 0901   Dressing Status Clean, dry & intact 02/19/24 0901   Dressing Type Transparent 02/19/24 0901       Peripheral IV 02/19/24 Left Hand (Active)   Site Assessment Clean, dry & intact 02/19/24 0901   Line Status Flushed 02/19/24 0901   Phlebitis Assessment No symptoms 02/19/24 0901   Infiltration Assessment 0 02/19/24 0901   Dressing Status Clean, dry & intact 02/19/24 0901   Dressing Type Transparent 02/19/24 0901        Opportunity for questions and clarification was provided.      Patient transported with:  O2 @ 4lpm  RN

## 2024-02-19 NOTE — ANESTHESIA PRE PROCEDURE
Department of Anesthesiology  Preprocedure Note       Name:  Anam Thomson   Age:  63 y.o.  :  1960                                          MRN:  [id number]         Date:  2024      Surgeon: Surgeon(s):  Antwon Barnett MD    Procedure: Procedure(s):  Ablation A-fib w complete ep study    Medications prior to admission:   Prior to Admission medications    Medication Sig Start Date End Date Taking? Authorizing Provider   amLODIPine (NORVASC) 10 MG tablet Take 1 tablet by mouth daily 24   Antwon Barnett MD   atorvastatin (LIPITOR) 20 MG tablet Take 1 tablet by mouth daily 24   Harpreet Chiang DO   flecainide (TAMBOCOR) 100 MG tablet Take 1 tablet by mouth 2 times daily 23   Roger Reid APRN - CNP   losartan (COZAAR) 100 MG tablet Take 1 tablet by mouth daily 23   Roger Reid APRN - CNP   apixaban (ELIQUIS) 5 MG TABS tablet Take 1 tablet by mouth 2 times daily TAKE 1 TABLET BY MOUTH TWICE A DAY 23   Harpreet Chiang DO   metoprolol succinate (TOPROL XL) 100 MG extended release tablet Take 1 tablet by mouth daily TAKE 1 TABLET BY MOUTH EVERY DAY 23   Harpreet Chiang DO       Current medications:    No current facility-administered medications for this encounter.       Allergies:  No Known Allergies    Problem List:    Patient Active Problem List   Diagnosis Code   • Tobacco abuse Z72.0   • Atrial fibrillation ([facility]) I48.91   • Cellulitis of foot L03.119   • Obesity, unspecified E66.9   • CKD (chronic kidney disease) stage 3, GFR 30-59 ml/min ([facility]) N18.30   • Atrial fibrillation with RVR ([facility]) I48.91   • HLD (hyperlipidemia) E78.5   • Hypertension I10   • Hypokalemia E87.6   • Hypomagnesemia E83.42   • Secondary hypercoagulable state ([facility]) D68.69       Past Medical History:        Diagnosis Date   • Atrial fibrillation with rapid ventricular response ([facility]) 2018   • Hypercholesterolemia    • Hypertension    • Kidney disease    • Pyoderma

## 2024-02-19 NOTE — PERIOP NOTE
Pt reports chest pressure across entire chest, 3/10 pain, non radiating. Dr. Higgins aware and states he will see patient in cath lab prep in recovery. No new orders received.

## 2024-02-19 NOTE — PROGRESS NOTES
Patient received to CPRU room # 9  Ambulatory from [facility]. Patient scheduled for Afib ablation today with Dr Barnett. Procedure reviewed & questions answered, voiced good understanding consent obtained & placed on chart. All medications and medical history reviewed. Will prep patient per orders. Patient & family updated on plan of care.      The patient has a fraility score of 3-MANAGING WELL, based on ambulation.

## 2024-02-26 ENCOUNTER — TELEMEDICINE (OUTPATIENT)
Dept: FAMILY MEDICINE CLINIC | Facility: CLINIC | Age: 64
End: 2024-02-26
Payer: MEDICARE

## 2024-02-26 DIAGNOSIS — R97.20 ELEVATED PSA: Primary | ICD-10-CM

## 2024-02-26 DIAGNOSIS — I10 ESSENTIAL (PRIMARY) HYPERTENSION: ICD-10-CM

## 2024-02-26 DIAGNOSIS — E78.01 FAMILIAL HYPERCHOLESTEROLEMIA: ICD-10-CM

## 2024-02-26 DIAGNOSIS — Z12.5 SPECIAL SCREENING, PROSTATE CANCER: ICD-10-CM

## 2024-02-26 PROCEDURE — 99442 PR PHYS/QHP TELEPHONE EVALUATION 11-20 MIN: CPT | Performed by: FAMILY MEDICINE

## 2024-02-26 RX ORDER — CIPROFLOXACIN 500 MG/1
TABLET, FILM COATED ORAL
Qty: 35 TABLET | Refills: 0 | Status: SHIPPED | OUTPATIENT
Start: 2024-02-26

## 2024-02-26 ASSESSMENT — ENCOUNTER SYMPTOMS
NAUSEA: 0
VOMITING: 0
SHORTNESS OF BREATH: 0

## 2024-02-26 NOTE — PROGRESS NOTES
Comprehensive Metabolic Panel    Collection Time: 02/12/24 11:22 AM   Result Value Ref Range    Sodium 137 136 - 146 mmol/L    Potassium 4.5 3.5 - 5.1 mmol/L    Chloride 107 103 - 113 mmol/L    CO2 24 21 - 32 mmol/L    Anion Gap 6 2 - 11 mmol/L    Glucose 120 (H) 65 - 100 mg/dL    BUN 32 (H) 8 - 23 MG/DL    Creatinine 1.70 (H) 0.8 - 1.5 MG/DL    Est, Glom Filt Rate 44 (L) >60 ml/min/1.73m2    Calcium 10.4 8.3 - 10.4 MG/DL    Total Bilirubin 0.4 0.2 - 1.1 MG/DL    ALT 34 12 - 65 U/L    AST 11 (L) 15 - 37 U/L    Alk Phosphatase 129 50 - 136 U/L    Total Protein 7.5 6.3 - 8.2 g/dL    Albumin 4.1 3.2 - 4.6 g/dL    Globulin 3.4 2.8 - 4.5 g/dL    Albumin/Globulin Ratio 1.2 0.4 - 1.6     Magnesium    Collection Time: 02/12/24 11:22 AM   Result Value Ref Range    Magnesium 2.3 1.8 - 2.4 mg/dL   HIV 1/2 Ag/Ab, 4TH Generation,W Rflx Confirm    Collection Time: 02/12/24 11:33 AM   Result Value Ref Range    HIV 1/2 Interp NONREACTIVE NONREACTIVE      HIV 1/2 Result Comment SEE NOTE     Hepatitis C Antibody    Collection Time: 02/12/24 11:33 AM   Result Value Ref Range    Hepatitis C Ab NONREACTIVE NONREACTIVE     EKG 12 Lead    Collection Time: 02/19/24  6:59 AM   Result Value Ref Range    Ventricular Rate 92 BPM    Atrial Rate 92 BPM    P-R Interval 164 ms    QRS Duration 104 ms    Q-T Interval 366 ms    QTc Calculation (Bazett) 452 ms    P Axis 51 degrees    R Axis 17 degrees    T Axis 95 degrees    Diagnosis       Normal sinus rhythm  Nonspecific ST and T wave abnormality  Abnormal ECG  When compared with ECG of 01-DEC-2023 12:39,  Vent. rate has increased BY  32 BPM  Nonspecific T wave abnormality, worse in Lateral leads  Confirmed by MD CHELSEA (), DOUGLAS ([id number]) on 2/19/2024 10:58:03 AM     LORENA (with contrast/ 3D PRN)    Collection Time: 02/19/24  7:40 AM   Result Value Ref Range    Body Surface Area 2.4 m2   POCT activated clotting time    Collection Time: 02/19/24  8:02 AM   Result Value Ref Range    Activated

## 2024-03-10 DIAGNOSIS — I48.20 CHRONIC ATRIAL FIBRILLATION (HCC): ICD-10-CM

## 2024-03-11 ENCOUNTER — OFFICE VISIT (OUTPATIENT)
Age: 64
End: 2024-03-11
Payer: MEDICARE

## 2024-03-11 VITALS
BODY MASS INDEX: 33.46 KG/M2 | HEIGHT: 72 IN | HEART RATE: 77 BPM | WEIGHT: 247 LBS | SYSTOLIC BLOOD PRESSURE: 146 MMHG | DIASTOLIC BLOOD PRESSURE: 98 MMHG

## 2024-03-11 DIAGNOSIS — Z72.0 TOBACCO ABUSE: ICD-10-CM

## 2024-03-11 DIAGNOSIS — E78.2 MIXED HYPERLIPIDEMIA: ICD-10-CM

## 2024-03-11 DIAGNOSIS — I48.19 PERSISTENT ATRIAL FIBRILLATION (HCC): ICD-10-CM

## 2024-03-11 DIAGNOSIS — I10 PRIMARY HYPERTENSION: Primary | ICD-10-CM

## 2024-03-11 PROCEDURE — G8417 CALC BMI ABV UP PARAM F/U: HCPCS | Performed by: INTERNAL MEDICINE

## 2024-03-11 PROCEDURE — 3077F SYST BP >= 140 MM HG: CPT | Performed by: INTERNAL MEDICINE

## 2024-03-11 PROCEDURE — 99214 OFFICE O/P EST MOD 30 MIN: CPT | Performed by: INTERNAL MEDICINE

## 2024-03-11 PROCEDURE — G8427 DOCREV CUR MEDS BY ELIG CLIN: HCPCS | Performed by: INTERNAL MEDICINE

## 2024-03-11 PROCEDURE — 93000 ELECTROCARDIOGRAM COMPLETE: CPT | Performed by: INTERNAL MEDICINE

## 2024-03-11 PROCEDURE — G8484 FLU IMMUNIZE NO ADMIN: HCPCS | Performed by: INTERNAL MEDICINE

## 2024-03-11 PROCEDURE — 1123F ACP DISCUSS/DSCN MKR DOCD: CPT | Performed by: INTERNAL MEDICINE

## 2024-03-11 PROCEDURE — 3080F DIAST BP >= 90 MM HG: CPT | Performed by: INTERNAL MEDICINE

## 2024-03-11 PROCEDURE — 3017F COLORECTAL CA SCREEN DOC REV: CPT | Performed by: INTERNAL MEDICINE

## 2024-03-11 PROCEDURE — 4004F PT TOBACCO SCREEN RCVD TLK: CPT | Performed by: INTERNAL MEDICINE

## 2024-03-11 RX ORDER — APIXABAN 5 MG/1
TABLET, FILM COATED ORAL
Qty: 60 TABLET | Refills: 0 | Status: SHIPPED | OUTPATIENT
Start: 2024-03-11

## 2024-03-11 RX ORDER — OLMESARTAN MEDOXOMIL AND HYDROCHLOROTHIAZIDE 40/25 40; 25 MG/1; MG/1
1 TABLET ORAL DAILY
Qty: 30 TABLET | Refills: 11 | Status: SHIPPED | OUTPATIENT
Start: 2024-03-11

## 2024-03-11 RX ORDER — POTASSIUM CHLORIDE 20 MEQ/1
20 TABLET, EXTENDED RELEASE ORAL DAILY
Qty: 30 TABLET | Refills: 11 | Status: SHIPPED | OUTPATIENT
Start: 2024-03-11

## 2024-03-14 ENCOUNTER — NURSE ONLY (OUTPATIENT)
Dept: FAMILY MEDICINE CLINIC | Facility: CLINIC | Age: 64
End: 2024-03-14
Payer: MEDICARE

## 2024-03-14 DIAGNOSIS — Z12.5 SPECIAL SCREENING, PROSTATE CANCER: ICD-10-CM

## 2024-03-14 DIAGNOSIS — R97.20 ELEVATED PSA: ICD-10-CM

## 2024-03-14 LAB — PSA SERPL-MCNC: 7.2 NG/ML

## 2024-03-14 PROCEDURE — 36415 COLL VENOUS BLD VENIPUNCTURE: CPT | Performed by: FAMILY MEDICINE

## 2024-03-18 RX ORDER — PANTOPRAZOLE SODIUM 40 MG/1
80 TABLET, DELAYED RELEASE ORAL
Qty: 60 TABLET | Refills: 0 | OUTPATIENT
Start: 2024-03-18

## 2024-03-19 DIAGNOSIS — I10 PRIMARY HYPERTENSION: ICD-10-CM

## 2024-03-19 DIAGNOSIS — I48.20 CHRONIC ATRIAL FIBRILLATION (HCC): ICD-10-CM

## 2024-03-19 RX ORDER — METOPROLOL SUCCINATE 100 MG/1
100 TABLET, EXTENDED RELEASE ORAL DAILY
Qty: 90 TABLET | Refills: 3 | Status: SHIPPED | OUTPATIENT
Start: 2024-03-19

## 2024-03-21 RX ORDER — SUCRALFATE 1 G/1
1 TABLET ORAL 4 TIMES DAILY
Qty: 120 TABLET | Refills: 0 | OUTPATIENT
Start: 2024-03-21

## 2024-03-25 ENCOUNTER — TELEMEDICINE (OUTPATIENT)
Dept: FAMILY MEDICINE CLINIC | Facility: CLINIC | Age: 64
End: 2024-03-25
Payer: MEDICARE

## 2024-03-25 DIAGNOSIS — R97.20 ELEVATED PSA: Primary | ICD-10-CM

## 2024-03-25 PROCEDURE — 99442 PR PHYS/QHP TELEPHONE EVALUATION 11-20 MIN: CPT | Performed by: FAMILY MEDICINE

## 2024-03-25 NOTE — PROGRESS NOTES
PROGRESS NOTE    SUBJECTIVE:   Anam Thomson is a 63 y.o. male seen for a follow up visit regarding the following chief complaint:     Chief Complaint   Patient presents with    Follow-up           HPI patient is doing a phone call visit to go over his lab resultsAnam Thomson is a 63 y.o. male evaluated via telephone on 3/25/2024 for Follow-up  .        Total Time: minutes: 11-20 minutes    Anam Thomson was evaluated through a synchronous (real-time) audio encounter. Patient identification was verified at the start of the visit. He (or guardian if applicable) is aware that this is a billable service, which includes applicable co-pays. This visit was conducted with the patient's (and/or legal guardian's) verbal consent. He has not had a related appointment within my department in the past 7 days or scheduled within the next 24 hours.   The patient was located at Home: [address] [place] [address].  The provider was located at Facility (Appt Dept): [address] [place] [address]  [address] [address].    Note: not billable if this call serves to triage the patient into an appointment for the relevant concern         Past Medical History, Past Surgical History, Family history, Social History, and Medications were all reviewed with the patient today and updated as necessary.       Current Outpatient Medications   Medication Sig Dispense Refill    metoprolol succinate (TOPROL XL) 100 MG extended release tablet TAKE 1 TABLET BY MOUTH EVERY DAY 90 tablet 3    ELIQUIS 5 MG TABS tablet TAKE 1 TABLET BY MOUTH 2 TIMES DAILY TAKE 1 TABLET BY MOUTH TWICE A DAY 60 tablet 0    olmesartan-hydroCHLOROthiazide (BENICAR HCT) 40-25 MG per tablet Take 1 tablet by mouth daily 30 tablet 11    potassium chloride (KLOR-CON M) 20 MEQ extended release tablet Take 1 tablet by mouth daily 30 tablet 11    ciprofloxacin (CIPRO) 500 MG tablet Take 1 Tablet by mouth twice daily for 1 week, then take 1 Tablet by mouth once

## 2024-04-06 DIAGNOSIS — I48.20 CHRONIC ATRIAL FIBRILLATION (HCC): ICD-10-CM

## 2024-04-08 RX ORDER — APIXABAN 5 MG/1
5 TABLET, FILM COATED ORAL 2 TIMES DAILY
Qty: 60 TABLET | Refills: 0 | OUTPATIENT
Start: 2024-04-08

## 2024-05-13 ENCOUNTER — OFFICE VISIT (OUTPATIENT)
Dept: UROLOGY | Age: 64
End: 2024-05-13
Payer: MEDICARE

## 2024-05-13 DIAGNOSIS — R97.20 ELEVATED PSA: Primary | ICD-10-CM

## 2024-05-13 PROCEDURE — 4004F PT TOBACCO SCREEN RCVD TLK: CPT | Performed by: UROLOGY

## 2024-05-13 PROCEDURE — G8427 DOCREV CUR MEDS BY ELIG CLIN: HCPCS | Performed by: UROLOGY

## 2024-05-13 PROCEDURE — 3017F COLORECTAL CA SCREEN DOC REV: CPT | Performed by: UROLOGY

## 2024-05-13 PROCEDURE — 1123F ACP DISCUSS/DSCN MKR DOCD: CPT | Performed by: UROLOGY

## 2024-05-13 PROCEDURE — G8417 CALC BMI ABV UP PARAM F/U: HCPCS | Performed by: UROLOGY

## 2024-05-13 PROCEDURE — 99214 OFFICE O/P EST MOD 30 MIN: CPT | Performed by: UROLOGY

## 2024-05-13 ASSESSMENT — ENCOUNTER SYMPTOMS: BACK PAIN: 0

## 2024-05-13 NOTE — PROGRESS NOTES
[facility] Urology  [address] [address]  [phone]          Anam Thomson  : 1960    Chief Complaint   Patient presents with    Elevated PSA          HPI     Anam Thomson is a 63 y.o. male referred by Dr. Chiang in regards to elevated psa.  He denies family history of prostate cancer.  He has only minimal LUTS.  He was treated for prostatitis after 10/20 psa.  He denies gross hematuria.      He was lost to follow up from  to .       PSA: 10/26/20 9.0, 21 5.5,  6.7,  5.6, 3/24 7.2    Past Medical History:   Diagnosis Date    Atrial fibrillation with rapid ventricular response (HCC) 2018    Elevated PSA     Hypercholesterolemia     Hypertension 2007    Kidney disease     Pyoderma gangrenosum 2018     Past Surgical History:   Procedure Laterality Date    EP DEVICE PROCEDURE N/A 2024    Ablation A-fib w complete ep study performed by Antwon Barnett MD at [facility] CARDIAC CATH LAB     Current Outpatient Medications   Medication Sig Dispense Refill    metoprolol succinate (TOPROL XL) 100 MG extended release tablet TAKE 1 TABLET BY MOUTH EVERY DAY 90 tablet 3    ELIQUIS 5 MG TABS tablet TAKE 1 TABLET BY MOUTH 2 TIMES DAILY TAKE 1 TABLET BY MOUTH TWICE A DAY 60 tablet 0    olmesartan-hydroCHLOROthiazide (BENICAR HCT) 40-25 MG per tablet Take 1 tablet by mouth daily 30 tablet 11    potassium chloride (KLOR-CON M) 20 MEQ extended release tablet Take 1 tablet by mouth daily 30 tablet 11    amLODIPine (NORVASC) 10 MG tablet Take 1 tablet by mouth daily 30 tablet 3    atorvastatin (LIPITOR) 20 MG tablet Take 1 tablet by mouth daily 90 tablet 3    flecainide (TAMBOCOR) 100 MG tablet Take 1 tablet by mouth 2 times daily 60 tablet 3     No current facility-administered medications for this visit.     No Known Allergies  Social History     Socioeconomic History    Marital status:      Spouse name: Not on file    Number of children:

## 2024-05-15 ENCOUNTER — OFFICE VISIT (OUTPATIENT)
Age: 64
End: 2024-05-15
Payer: MEDICARE

## 2024-05-15 VITALS
DIASTOLIC BLOOD PRESSURE: 86 MMHG | SYSTOLIC BLOOD PRESSURE: 144 MMHG | BODY MASS INDEX: 33.86 KG/M2 | HEIGHT: 72 IN | HEART RATE: 85 BPM | WEIGHT: 250 LBS

## 2024-05-15 DIAGNOSIS — I48.19 PERSISTENT ATRIAL FIBRILLATION (HCC): Primary | ICD-10-CM

## 2024-05-15 PROCEDURE — 99213 OFFICE O/P EST LOW 20 MIN: CPT | Performed by: INTERNAL MEDICINE

## 2024-05-15 PROCEDURE — 93000 ELECTROCARDIOGRAM COMPLETE: CPT | Performed by: INTERNAL MEDICINE

## 2024-05-15 PROCEDURE — 1123F ACP DISCUSS/DSCN MKR DOCD: CPT | Performed by: INTERNAL MEDICINE

## 2024-05-15 PROCEDURE — G8417 CALC BMI ABV UP PARAM F/U: HCPCS | Performed by: INTERNAL MEDICINE

## 2024-05-15 PROCEDURE — 4004F PT TOBACCO SCREEN RCVD TLK: CPT | Performed by: INTERNAL MEDICINE

## 2024-05-15 PROCEDURE — 3077F SYST BP >= 140 MM HG: CPT | Performed by: INTERNAL MEDICINE

## 2024-05-15 PROCEDURE — G8428 CUR MEDS NOT DOCUMENT: HCPCS | Performed by: INTERNAL MEDICINE

## 2024-05-15 PROCEDURE — 3017F COLORECTAL CA SCREEN DOC REV: CPT | Performed by: INTERNAL MEDICINE

## 2024-05-15 PROCEDURE — 3079F DIAST BP 80-89 MM HG: CPT | Performed by: INTERNAL MEDICINE

## 2024-05-15 RX ORDER — CYCLOSPORINE 0.5 MG/ML
EMULSION OPHTHALMIC
COMMUNITY
Start: 2024-05-09 | End: 2024-09-06

## 2024-05-15 RX ORDER — PREDNISONE 20 MG/1
TABLET ORAL
COMMUNITY
Start: 2024-05-09 | End: 2024-06-05

## 2024-05-15 RX ORDER — DOXYCYCLINE 100 MG/1
TABLET ORAL
COMMUNITY
Start: 2024-05-09 | End: 2024-05-19

## 2024-05-15 NOTE — PROGRESS NOTES
[facility] CARDIOLOGY, [address], [address]  [place] [address]  PHONE: [phone]  Anam Thomson  1960    Chief Complant:    Chief Complaint   Patient presents with    Atrial Fibrillation     10-12 wks post afib abl     Consultation is requested by [unfilled] for evaluation of Atrial Fibrillation (10-12 wks post afib abl)    Reason for Consultation: afib    History:  Anam Thomson is a very pleasant 63 y.o. male with a past medical and cardiac history significant for HTN, HLD, CKDIII, morbid obesity, persistent atrial fibrillation s/p LORENA/DCCV 3/2022 with recent admission with afib s/p repeat LORENA/DCCV and presents s/p afib ablation. He is doing well, no cardiac complaints.     Cardiac PMH: (Old records have been reviewed and summarized below)  TTE (3/22/22): EF 55-60%, mod LAE    Reviewed office note Dr. Guardado 3/11/24    Past Medical History, Past Surgical History, Family history, Social History, and Medications were all reviewed with the patient today and updated as necessary.     Current Outpatient Medications   Medication Sig Dispense Refill    predniSONE (DELTASONE) 20 MG tablet 1 tablet 3 times a day for 5 days, 1 tablet 2 times a day for 7 days, 1 tablet once a day for 7 days, 0.5 tablet once a day for 8 days Orally as instructed for 27 days      doxycycline monohydrate (ADOXA) 100 MG tablet 1 tablet Orally Once a day for 10 days      RESTASIS 0.05 % ophthalmic emulsion 1 drop into wound bed Ophthalmic once a day for 60 days      metoprolol succinate (TOPROL XL) 100 MG extended release tablet TAKE 1 TABLET BY MOUTH EVERY DAY 90 tablet 3    ELIQUIS 5 MG TABS tablet TAKE 1 TABLET BY MOUTH 2 TIMES DAILY TAKE 1 TABLET BY MOUTH TWICE A DAY 60 tablet 0    olmesartan-hydroCHLOROthiazide (BENICAR HCT) 40-25 MG per tablet Take 1 tablet by mouth daily 30 tablet 11    potassium chloride (KLOR-CON M) 20 MEQ extended release tablet Take 1 tablet by mouth daily 30 tablet 11    amLODIPine

## 2024-05-29 ENCOUNTER — HOSPITAL ENCOUNTER (OUTPATIENT)
Dept: MRI IMAGING | Age: 64
Discharge: HOME | End: 2024-06-01
Attending: UROLOGY
Payer: MEDICARE

## 2024-05-29 DIAGNOSIS — R97.20 ELEVATED PSA: ICD-10-CM

## 2024-05-29 PROCEDURE — A9579 GAD-BASE MR CONTRAST NOS,1ML: HCPCS | Performed by: UROLOGY

## 2024-05-29 PROCEDURE — 72197 MRI PELVIS W/O & W/DYE: CPT

## 2024-05-29 PROCEDURE — 6360000004 HC RX CONTRAST MEDICATION: Performed by: UROLOGY

## 2024-05-29 RX ADMIN — GADOTERIDOL 25 ML: 279.3 INJECTION, SOLUTION INTRAVENOUS at 19:36

## 2024-05-30 ENCOUNTER — TELEPHONE (OUTPATIENT)
Dept: UROLOGY | Age: 64
End: 2024-05-30

## 2024-05-30 DIAGNOSIS — R97.20 ELEVATED PROSTATE SPECIFIC ANTIGEN (PSA): ICD-10-CM

## 2024-05-30 RX ORDER — CIPROFLOXACIN 500 MG/1
500 TABLET, FILM COATED ORAL 2 TIMES DAILY
Qty: 2 TABLET | Refills: 0 | Status: SHIPPED | OUTPATIENT
Start: 2024-05-30 | End: 2024-05-31

## 2024-05-30 NOTE — TELEPHONE ENCOUNTER
Cardiac Pre-operative Assessment      Physician or Practice Requesting Medication Clearance or Risk Assessment:   Dr Avalos   : Nelly     Contact Phone Number: [phone]    Fax Number: [phone]    Date of Surgery/Procedure: 6/28    Type of Surgery or Procedure: fusion prostate biopsy       Medications to hold eliquis    # Days pre-procedure to hold 3 days     Restart medication ____    Risk assessment:      Please send the response back in this phone encounter

## 2024-05-30 NOTE — TELEPHONE ENCOUNTER
----- Message from Jcarlos Avalos MD sent at 5/30/2024  7:18 AM EDT -----  Regarding: schedule  Hospital: [facility]     Surgeon: rosalinda  Assist: NONE    Diagnosis: elevated psa    Procedure:  uronav guided prostate biopsy    Posting time: 1 hour    Special Instruments Needed:  NONE    Anesthesia: GENERAL    Labs: CBC, BMP    Tests: EKG per anesthesia    Blood: NONE    Bowel Prep: NONE    Special Instructions: He will need to hold eliquis for biopsy.  Get clearance for this.  THANKS.  Please go over enema/cipro/hold blood thinners.  Cipro sent.     Orders/HandP: will do when posted/will do day of    Follow up appointment: 2 weeks sherwin after for talk bx

## 2024-05-30 NOTE — TELEPHONE ENCOUNTER
Procedures: Procedure(s):   PROSTATE BIOPSY FUSION   Date: 6/28/2024   Time: 1151   Location: [facility] MAIN OR

## 2024-06-25 ENCOUNTER — TELEPHONE (OUTPATIENT)
Dept: SURGERY | Age: 64
End: 2024-06-25

## 2024-06-25 NOTE — TELEPHONE ENCOUNTER
Patient says he has not received instructions on Eliquis. As of this morning he is taking as prescribed (5mg bid.) Surgery is scheduled for 6/28. Advised patient to call surgeon's office this morning for clarification.

## 2024-06-26 ENCOUNTER — TELEPHONE (OUTPATIENT)
Dept: UROLOGY | Age: 64
End: 2024-06-26

## 2024-06-26 DIAGNOSIS — R97.20 ELEVATED PSA: ICD-10-CM

## 2024-06-26 NOTE — TELEPHONE ENCOUNTER
Pt states his surgery is tomorrow and wanted to know if he should stop Eliquis. I told him he was supposed to stop it 3 days prior and he said no one told him that. He took his last dose late last night. Please advise.

## 2024-07-05 PROBLEM — R97.20 ELEVATED PSA: Status: ACTIVE | Noted: 2024-06-26

## 2024-07-05 NOTE — TELEPHONE ENCOUNTER
Called and spoke with the patient giving him all instructions for prostate biopsy.  Patient still has abx so no additional needing to be called in.  Patient acknowledged understanding of enema prior to leaving his home.      Request sent to scheduling for Bailey 7/12/24.

## 2024-07-05 NOTE — TELEPHONE ENCOUNTER
Procedures: Procedure(s):   MRI FUSION PROSTATE BIOPSY   Date: 7/12/2024   Time: 1028   Location: [facility] MAIN OR 02     Scheduled.  Not sure if orders need to be completed.

## 2024-07-10 NOTE — PROGRESS NOTES
Stop Advil  Start arthritis strength Tylenol  Increase magnesium to 400 mg daily  Schedule physical therapy  Continue all current medications   Updated pre-assessment by phone with the patient due to rescheduled surgery. Pt states that surgery was rescheduled due to not holding Eliquis prior to surgery. Pt denies any recent changes in health history or medications since original pre-assessment on 06/25/2024. Pt has all original surgery / pre-op instruction sheets and verbalizes understanding of all instructions.  If you have not received your arrival time by 5 pm the business day before surgery, Please call [phone] ([address]) or [phone]. NPO p MN including water, gum, candy, mints & tobacco. Take only Atorvastatin, Metoprolol, Amlodipine, Prednisone with sips of water on the day of surgery. Pt instructed to follow instructions provided by the surgeon and prescribing physician in regards to Eliquis medication. Pt instructed they may brush teeth & may swish and spit. Shower using antibacterial soap and/or antiseptic wash the night before and morning of surgery. If using antiseptic wash, spread evenly from the neck down, avoiding genitalia. Leave on for 60 seconds, then rinse thouroughly. Do not apply ANY makeup, lotion, powder, moisturizers, cologne, aftershave, nail polish or hair products. Leave all jewelry (including piercings) and valuables at home. Sleep on freshly laundered linens and wear freshly laundered clothing that is comfortable and loose fitting. You must have a responsible adult to drive you to the hospital, remain during the entire procedure, and drive you home, and remain with you for 24 hours. If you have any questions call [phone] or [phone].    You may be required to pay a deductible or co-pay on the day of your procedure. You can pre-pay by calling [phone] if your surgery is at the [facility] or [phone] if your surgery is at the [facility].    Please bring the following that apply: CPAP or Bi-Pap, portable oxygen, rescue inhalers, remote for spinal cord stimulator or any electronic remote implant,  and post-operative supplies such as cold therapy pad, sling, brace, shoe or boot as it applies to your case.      No Labs/orders     [address] [place] [address]

## 2024-07-12 ENCOUNTER — HOSPITAL ENCOUNTER (OUTPATIENT)
Age: 64
Setting detail: OUTPATIENT SURGERY
Discharge: HOME | End: 2024-07-12
Attending: UROLOGY | Admitting: UROLOGY
Payer: MEDICARE

## 2024-07-12 ENCOUNTER — ANESTHESIA (OUTPATIENT)
Dept: SURGERY | Age: 64
End: 2024-07-12
Payer: MEDICARE

## 2024-07-12 ENCOUNTER — ANESTHESIA EVENT (OUTPATIENT)
Dept: SURGERY | Age: 64
End: 2024-07-12
Payer: MEDICARE

## 2024-07-12 VITALS
SYSTOLIC BLOOD PRESSURE: 104 MMHG | RESPIRATION RATE: 14 BRPM | OXYGEN SATURATION: 95 % | HEART RATE: 68 BPM | TEMPERATURE: 97.8 F | DIASTOLIC BLOOD PRESSURE: 56 MMHG

## 2024-07-12 LAB — POTASSIUM BLD-SCNC: 3.9 MMOL/L (ref 3.5–5.1)

## 2024-07-12 PROCEDURE — 3600000012 HC SURGERY LEVEL 2 ADDTL 15MIN: Performed by: UROLOGY

## 2024-07-12 PROCEDURE — 7100000000 HC PACU RECOVERY - FIRST 15 MIN: Performed by: UROLOGY

## 2024-07-12 PROCEDURE — 88305 TISSUE EXAM BY PATHOLOGIST: CPT

## 2024-07-12 PROCEDURE — 2500000003 HC RX 250 WO HCPCS

## 2024-07-12 PROCEDURE — 3700000000 HC ANESTHESIA ATTENDED CARE: Performed by: UROLOGY

## 2024-07-12 PROCEDURE — 3700000001 HC ADD 15 MINUTES (ANESTHESIA): Performed by: UROLOGY

## 2024-07-12 PROCEDURE — 2580000003 HC RX 258: Performed by: UROLOGY

## 2024-07-12 PROCEDURE — 84132 ASSAY OF SERUM POTASSIUM: CPT

## 2024-07-12 PROCEDURE — 2580000003 HC RX 258: Performed by: ANESTHESIOLOGY

## 2024-07-12 PROCEDURE — 76872 US TRANSRECTAL: CPT | Performed by: UROLOGY

## 2024-07-12 PROCEDURE — 6360000002 HC RX W HCPCS

## 2024-07-12 PROCEDURE — 55700 PR PROSTATE NEEDLE BIOPSY ANY APPROACH: CPT | Performed by: UROLOGY

## 2024-07-12 PROCEDURE — 7100000010 HC PHASE II RECOVERY - FIRST 15 MIN: Performed by: UROLOGY

## 2024-07-12 PROCEDURE — 6360000002 HC RX W HCPCS: Performed by: UROLOGY

## 2024-07-12 PROCEDURE — 7100000011 HC PHASE II RECOVERY - ADDTL 15 MIN: Performed by: UROLOGY

## 2024-07-12 PROCEDURE — 2709999900 HC NON-CHARGEABLE SUPPLY: Performed by: UROLOGY

## 2024-07-12 PROCEDURE — 3600000002 HC SURGERY LEVEL 2 BASE: Performed by: UROLOGY

## 2024-07-12 RX ORDER — SODIUM CHLORIDE 0.9 % (FLUSH) 0.9 %
5-40 SYRINGE (ML) INJECTION PRN
Status: DISCONTINUED | OUTPATIENT
Start: 2024-07-12 | End: 2024-07-12 | Stop reason: HOSPADM

## 2024-07-12 RX ORDER — PROPOFOL 10 MG/ML
INJECTION, EMULSION INTRAVENOUS CONTINUOUS PRN
Status: DISCONTINUED | OUTPATIENT
Start: 2024-07-12 | End: 2024-07-12 | Stop reason: SDUPTHER

## 2024-07-12 RX ORDER — SODIUM CHLORIDE 0.9 % (FLUSH) 0.9 %
5-40 SYRINGE (ML) INJECTION EVERY 12 HOURS SCHEDULED
Status: DISCONTINUED | OUTPATIENT
Start: 2024-07-12 | End: 2024-07-12 | Stop reason: HOSPADM

## 2024-07-12 RX ORDER — LIDOCAINE HYDROCHLORIDE 10 MG/ML
1 INJECTION, SOLUTION INFILTRATION; PERINEURAL
Status: DISCONTINUED | OUTPATIENT
Start: 2024-07-12 | End: 2024-07-12 | Stop reason: HOSPADM

## 2024-07-12 RX ORDER — NALOXONE HYDROCHLORIDE 0.4 MG/ML
INJECTION, SOLUTION INTRAMUSCULAR; INTRAVENOUS; SUBCUTANEOUS PRN
Status: DISCONTINUED | OUTPATIENT
Start: 2024-07-12 | End: 2024-07-12 | Stop reason: HOSPADM

## 2024-07-12 RX ORDER — MIDAZOLAM HYDROCHLORIDE 1 MG/ML
INJECTION INTRAMUSCULAR; INTRAVENOUS PRN
Status: DISCONTINUED | OUTPATIENT
Start: 2024-07-12 | End: 2024-07-12 | Stop reason: SDUPTHER

## 2024-07-12 RX ORDER — LIDOCAINE HYDROCHLORIDE 20 MG/ML
INJECTION, SOLUTION EPIDURAL; INFILTRATION; INTRACAUDAL; PERINEURAL PRN
Status: DISCONTINUED | OUTPATIENT
Start: 2024-07-12 | End: 2024-07-12 | Stop reason: SDUPTHER

## 2024-07-12 RX ORDER — SODIUM CHLORIDE, SODIUM LACTATE, POTASSIUM CHLORIDE, CALCIUM CHLORIDE 600; 310; 30; 20 MG/100ML; MG/100ML; MG/100ML; MG/100ML
INJECTION, SOLUTION INTRAVENOUS CONTINUOUS
Status: DISCONTINUED | OUTPATIENT
Start: 2024-07-12 | End: 2024-07-12 | Stop reason: HOSPADM

## 2024-07-12 RX ORDER — OXYCODONE HYDROCHLORIDE 5 MG/1
5 TABLET ORAL
Status: DISCONTINUED | OUTPATIENT
Start: 2024-07-12 | End: 2024-07-12 | Stop reason: HOSPADM

## 2024-07-12 RX ORDER — SODIUM CHLORIDE 9 MG/ML
INJECTION, SOLUTION INTRAVENOUS PRN
Status: DISCONTINUED | OUTPATIENT
Start: 2024-07-12 | End: 2024-07-12 | Stop reason: HOSPADM

## 2024-07-12 RX ORDER — HYDROMORPHONE HYDROCHLORIDE 2 MG/ML
0.5 INJECTION, SOLUTION INTRAMUSCULAR; INTRAVENOUS; SUBCUTANEOUS EVERY 5 MIN PRN
Status: DISCONTINUED | OUTPATIENT
Start: 2024-07-12 | End: 2024-07-12 | Stop reason: HOSPADM

## 2024-07-12 RX ORDER — ONDANSETRON 2 MG/ML
4 INJECTION INTRAMUSCULAR; INTRAVENOUS
Status: DISCONTINUED | OUTPATIENT
Start: 2024-07-12 | End: 2024-07-12 | Stop reason: HOSPADM

## 2024-07-12 RX ADMIN — PHENYLEPHRINE HYDROCHLORIDE 100 MCG: 0.1 INJECTION, SOLUTION INTRAVENOUS at 09:10

## 2024-07-12 RX ADMIN — WATER 1000 MG: 1 INJECTION INTRAMUSCULAR; INTRAVENOUS; SUBCUTANEOUS at 09:02

## 2024-07-12 RX ADMIN — SODIUM CHLORIDE, POTASSIUM CHLORIDE, SODIUM LACTATE AND CALCIUM CHLORIDE: 600; 310; 30; 20 INJECTION, SOLUTION INTRAVENOUS at 08:20

## 2024-07-12 RX ADMIN — LIDOCAINE HYDROCHLORIDE 40 MG: 20 INJECTION, SOLUTION EPIDURAL; INFILTRATION; INTRACAUDAL; PERINEURAL at 09:03

## 2024-07-12 RX ADMIN — PROPOFOL 140 MCG/KG/MIN: 10 INJECTION, EMULSION INTRAVENOUS at 09:01

## 2024-07-12 RX ADMIN — MIDAZOLAM 2 MG: 1 INJECTION INTRAMUSCULAR; INTRAVENOUS at 08:56

## 2024-07-12 ASSESSMENT — LIFESTYLE VARIABLES: SMOKING_STATUS: 1

## 2024-07-12 ASSESSMENT — PAIN - FUNCTIONAL ASSESSMENT
PAIN_FUNCTIONAL_ASSESSMENT: NONE - DENIES PAIN
PAIN_FUNCTIONAL_ASSESSMENT: NONE - DENIES PAIN

## 2024-07-12 ASSESSMENT — PAIN SCALES - GENERAL: PAINLEVEL_OUTOF10: 3

## 2024-07-12 NOTE — ANESTHESIA PRE PROCEDURE
BP Readings from Last 3 Encounters:   07/12/24 (!) 163/90   05/15/24 (!) 144/86   05/06/24 (!) 160/96       NPO Status: Time of last liquid consumption: 2000                        Time of last solid consumption: 2000                        Date of last liquid consumption: 07/11/24                        Date of last solid food consumption: 07/11/24    BMI:   Wt Readings from Last 3 Encounters:   05/15/24 113.4 kg (250 lb)   05/06/24 112 kg (246 lb 14.6 oz)   03/11/24 112 kg (247 lb)     There is no height or weight on file to calculate BMI.    CBC:   Lab Results   Component Value Date/Time    WBC 16.5 02/12/2024 11:22 AM    RBC 6.02 02/12/2024 11:22 AM    HGB 17.1 02/12/2024 11:22 AM    HCT 51.7 02/12/2024 11:22 AM    MCV 85.9 02/12/2024 11:22 AM    MCV 90.3 09/19/2023 12:03 PM    RDW 13.5 02/12/2024 11:22 AM     02/12/2024 11:22 AM       CMP:   Lab Results   Component Value Date/Time     02/12/2024 11:22 AM    K 4.5 02/12/2024 11:22 AM     02/12/2024 11:22 AM    CO2 24 02/12/2024 11:22 AM    BUN 32 02/12/2024 11:22 AM    CREATININE 1.70 02/12/2024 11:22 AM    GFRAA 49 03/23/2022 06:22 AM    AGRATIO 0.9 03/22/2022 01:21 PM    LABGLOM 44 02/12/2024 11:22 AM    GLUCOSE 120 02/12/2024 11:22 AM    CALCIUM 10.4 02/12/2024 11:22 AM    BILITOT 0.4 02/12/2024 11:22 AM    ALKPHOS 129 02/12/2024 11:22 AM    ALKPHOS 96 03/22/2022 01:21 PM    AST 11 02/12/2024 11:22 AM    ALT 34 02/12/2024 11:22 AM       POC Tests: No results for input(s): \"POCGLU\", \"POCNA\", \"POCK\", \"POCCL\", \"POCBUN\", \"POCHEMO\", \"POCHCT\" in the last 72 hours.    Coags: No results found for: \"PROTIME\", \"INR\", \"APTT\"    HCG (If Applicable): No results found for: \"PREGTESTUR\", \"PREGSERUM\", \"HCG\", \"HCGQUANT\"     ABGs: No results found for: \"PHART\", \"PO2ART\", \"JGD2FES\", \"BQT8RIE\", \"BEART\", \"E5ENFWQW\"     Type & Screen (If Applicable):  No results found for: \"LABABO\"    Drug/Infectious Status (If Applicable):  Lab Results

## 2024-07-12 NOTE — OP NOTE
Operative Note                Patient: Anam Thomson, [id number]    Date of Surgery: 07/12/24    Preoperative Diagnosis: Elevated psa and prostate lesion(s) on MRI imaging    Postoperative Diagnosis:  same    Surgeon(s) and Role:     * Arlette Avalos MD - Primary     Anesthesia:  MAC     Procedure: Procedure(s):  MRI fused TRUS guided prostate biopsy     RISKS DISCUSSION:     Discussed the risk of surgery including infection, hematoma, bleeding, and the risks of general anesthetic.  The patient understands the risks, any and all questions were answered to the patient's satisfaction and they freely signed the consent for operation.     MRI FUSED TRANSRECTAL ULTRASOUND GUIDED BIOPSY OF THE PROSTATE    All risks, benefits and alternatives were again reviewed and he is willing to proceed at this time.  The patient was placed in the left lateral decubitus position.  I then inserted the transrectal ultrasound probe into the rectum.  The prostate was visualized.  The prostate appeared homogenous in appearance.   MRI images were linked to ultrasonographic images.  There were 3 regions of interest based upon MRI imaging (ROI1 = midline apex, ROI2=RIGHT apex, ROI3=LEFT lateral apex).  6 biopsies of regions of interest were then obtained using the ultrasound images for guidance that had been linked to the previous MRI using Uronav.  I then performed 6 needle core biopsies using a standard sextant biopsy format with traditional ultrasound images for guidance.  The ultrasound probe was removed.  The patient tolerated the procedure well.      PROSTATE VOLUME:  67 gr    Estimated Blood Loss:  minimal    Specimens: prostate biopsies             Drains: none                 Implants: * No implants in log *           Signed By: ARLETTE AVALOS MD            Specimens:   ID Type Source Tests Collected by Time Destination   A : CHELSEY 1-1 Tissue Prostate SURGICAL PATHOLOGY Arlette Avalos MD 7/12/2024 0906    B : CHELSEY 1-2

## 2024-07-12 NOTE — PERIOP NOTE
Discharge instructions reviewed with pt and family member who verbalize understanding of follow up care.     Pt able to ambulate and void prior to discharge.

## 2024-07-12 NOTE — DISCHARGE INSTRUCTIONS
If you have had surgery in the past 7-10 days by one of our providers and are having fever, bleeding, or drainage from an incision, have an opening in an incision, or having issues urinating properly, please call [phone].    Prostate Biopsy and Ultrasound:   A prostate biopsy is a type of test. Your doctor takes small tissue samples from your prostate gland. Then another doctor looks at the tissue under a microscope to see if there are cancer cells.  This test is done by a doctor who specializes in men's genital and urinary problems (urologist). It can be done in your doctor's office, a day surgery clinic, or a hospital operating room. To get the tissue samples from the prostate, the doctor inserts a thin needle through the rectum, the urethra, or the area between the anus and scrotum (perineum). The most common method is through the rectum. Your doctor may use ultrasound to help guide the needle.  What else should you know about this test?  A prostate biopsy has a slight risk of causing problems such as infection or bleeding.  If the biopsy went through your rectum, you may have a small amount of bleeding from your rectum for 2 to 3 days after the biopsy.  You may have a little pain in your pelvic area. You may also have a little blood in your urine for 1 to 5 days.  You may have some blood in your semen for a week or longer.  Do not do heavy work or exercise for 4 hours after the test.  Your doctor will tell you how long it may take to get your results back.  Follow-up care is a key part of your treatment and safety. Be sure to make and go to all appointments, and call your doctor if you are having problems. It's also a good idea to keep a list of the medicines you take. Ask your doctor when you can expect to have your test results.    After general anesthesia or intravenous sedation, for 24 hours or while taking prescription Narcotics:  Limit your activities  A responsible adult needs to be with you for the  next 24 hours  Do not drive and operate hazardous machinery  Do not make important personal or business decisions  Do not drink alcoholic beverages  If you have not urinated within 8 hours after discharge, and you are experiencing discomfort from urinary retention, please go to the nearest ED.  If you have sleep apnea and have a CPAP machine, please use it for all naps and sleeping.  Please use caution when taking narcotics and any of your home medications that may cause drowsiness.  *  Please give a list of your current medications to your Primary Care Provider.  *  Please update this list whenever your medications are discontinued, doses are      changed, or new medications (including over-the-counter products) are added.  *  Please carry medication information at all times in case of emergency situations.    These are general instructions for a healthy lifestyle:  No smoking/ No tobacco products/ Avoid exposure to second hand smoke  Surgeon General's Warning:  Quitting smoking now greatly reduces serious risk to your health.  Obesity, smoking, and sedentary lifestyle greatly increases your risk for illness  A healthy diet, regular physical exercise & weight monitoring are important for maintaining a healthy lifestyle    You may be retaining fluid if you have a history of heart failure or if you experience any of the following symptoms:  Weight gain of 3 pounds or more overnight or 5 pounds in a week, increased swelling in our hands or feet or shortness of breath while lying flat in bed.  Please call your doctor as soon as you notice any of these symptoms; do not wait until your next office visit.

## 2024-07-12 NOTE — ANESTHESIA POSTPROCEDURE EVALUATION
Department of Anesthesiology  Postprocedure Note    Patient: Anma Thomson  MRN: [id number]  YOB: 1960  Date of evaluation: 7/12/2024    Procedure Summary       Date: 07/12/24 Room / Location: [facility] MAIN OR  / [facility] MAIN OR    Anesthesia Start: 0858 Anesthesia Stop: 0923    Procedure: MRI FUSION PROSTATE BIOPSY (Rectum) Diagnosis:       Elevated PSA      (Elevated PSA [R97.20])    Providers: Jcarlos Avalos MD Responsible Provider: Valdez Lopez Jr., MD    Anesthesia Type: TIVA ASA Status: 3            Anesthesia Type: No value filed.    Matthew Phase I: Matthew Score: 5    Matthew Phase II:      Anesthesia Post Evaluation    Patient location during evaluation: PACU  Patient participation: complete - patient participated  Level of consciousness: awake  Pain score: 0  Airway patency: patent  Nausea & Vomiting: no nausea and no vomiting  Cardiovascular status: blood pressure returned to baseline and hemodynamically stable  Respiratory status: acceptable, spontaneous ventilation and nonlabored ventilation  Hydration status: euvolemic  Multimodal analgesia pain management approach  Pain management: adequate    No notable events documented.

## 2024-07-31 ENCOUNTER — OFFICE VISIT (OUTPATIENT)
Dept: UROLOGY | Age: 64
End: 2024-07-31
Payer: MEDICARE

## 2024-07-31 DIAGNOSIS — N13.8 BPH WITH OBSTRUCTION/LOWER URINARY TRACT SYMPTOMS: ICD-10-CM

## 2024-07-31 DIAGNOSIS — R97.20 ELEVATED PSA: Primary | ICD-10-CM

## 2024-07-31 DIAGNOSIS — N40.1 BPH WITH OBSTRUCTION/LOWER URINARY TRACT SYMPTOMS: ICD-10-CM

## 2024-07-31 DIAGNOSIS — I48.0 PAROXYSMAL ATRIAL FIBRILLATION (HCC): Primary | ICD-10-CM

## 2024-07-31 PROCEDURE — 1123F ACP DISCUSS/DSCN MKR DOCD: CPT | Performed by: UROLOGY

## 2024-07-31 PROCEDURE — 4004F PT TOBACCO SCREEN RCVD TLK: CPT | Performed by: UROLOGY

## 2024-07-31 PROCEDURE — G8427 DOCREV CUR MEDS BY ELIG CLIN: HCPCS | Performed by: UROLOGY

## 2024-07-31 PROCEDURE — 99214 OFFICE O/P EST MOD 30 MIN: CPT | Performed by: UROLOGY

## 2024-07-31 PROCEDURE — 3017F COLORECTAL CA SCREEN DOC REV: CPT | Performed by: UROLOGY

## 2024-07-31 PROCEDURE — G8417 CALC BMI ABV UP PARAM F/U: HCPCS | Performed by: UROLOGY

## 2024-07-31 RX ORDER — FINASTERIDE 5 MG/1
5 TABLET, FILM COATED ORAL DAILY
Qty: 90 TABLET | Refills: 3 | Status: SHIPPED | OUTPATIENT
Start: 2024-07-31

## 2024-07-31 NOTE — PROGRESS NOTES
[facility]  [address] [place] [id number]  [phone]          Anam Thomson  : 1960    Chief Complaint   Patient presents with    Results     Talk Bx          HPI     Anam Thomson is a 63 y.o. male referred by Dr. Chiang in regards to elevated psa.  He denies family history of prostate cancer.  He has only minimal LUTS.  He was treated for prostatitis after 10/20 psa.  He denies gross hematuria.       He was lost to follow up from  to .       MRI 24 revealed a 67 gr prostate with 3 PIRADS 4 lesions.  Uronav prostate biopsy 24 revealed a 67 gram gland and all cores were benign.       PSA: 10/26/20 9.0, 21 5.5,  6.7,  5.6, 3/24 7.2          Past Medical History:   Diagnosis Date    Atrial fibrillation with rapid ventricular response (HCC) 2018    CKD (chronic kidney disease), stage III (HCC)     Elevated PSA     Hypercholesterolemia     Hypertension 2007    Kidney disease     Pyoderma gangrenosum 2018; 24    Currently being treated for this condition - R foot     Past Surgical History:   Procedure Laterality Date    EP DEVICE PROCEDURE N/A 2024    Ablation A-fib w complete ep study performed by Antwon Barnett MD at [facility] CARDIAC CATH LAB    PROSTATE BIOPSY N/A 2024    MRI FUSION PROSTATE BIOPSY performed by Jcarlos Avalos MD at [facility] MAIN OR     Current Outpatient Medications   Medication Sig Dispense Refill    finasteride (PROSCAR) 5 MG tablet Take 1 tablet by mouth daily 90 tablet 3    losartan (COZAAR) 100 MG tablet Take 1 tablet by mouth daily Thinks he takes but not sure      predniSONE (DELTASONE) 10 MG tablet Take 2 tablets by mouth 3 times daily      RESTASIS 0.05 % ophthalmic emulsion Uses on foot      metoprolol succinate (TOPROL XL) 100 MG extended release tablet TAKE 1 TABLET BY MOUTH EVERY DAY 90 tablet 3    ELIQUIS 5 MG TABS tablet TAKE 1 TABLET BY MOUTH 2 TIMES DAILY TAKE 1

## 2024-08-20 ENCOUNTER — TELEPHONE (OUTPATIENT)
Dept: FAMILY MEDICINE CLINIC | Facility: CLINIC | Age: 64
End: 2024-08-20

## 2024-08-20 ENCOUNTER — OFFICE VISIT (OUTPATIENT)
Dept: FAMILY MEDICINE CLINIC | Facility: CLINIC | Age: 64
End: 2024-08-20
Payer: MEDICARE

## 2024-08-20 VITALS
HEART RATE: 77 BPM | SYSTOLIC BLOOD PRESSURE: 132 MMHG | BODY MASS INDEX: 33.72 KG/M2 | HEIGHT: 72 IN | DIASTOLIC BLOOD PRESSURE: 86 MMHG | WEIGHT: 249 LBS

## 2024-08-20 DIAGNOSIS — M25.551 CHRONIC RIGHT HIP PAIN: Primary | ICD-10-CM

## 2024-08-20 DIAGNOSIS — G89.29 CHRONIC RIGHT HIP PAIN: Primary | ICD-10-CM

## 2024-08-20 DIAGNOSIS — M25.511 CHRONIC RIGHT SHOULDER PAIN: ICD-10-CM

## 2024-08-20 DIAGNOSIS — G89.29 CHRONIC RIGHT SHOULDER PAIN: ICD-10-CM

## 2024-08-20 PROCEDURE — 3017F COLORECTAL CA SCREEN DOC REV: CPT | Performed by: FAMILY MEDICINE

## 2024-08-20 PROCEDURE — G8427 DOCREV CUR MEDS BY ELIG CLIN: HCPCS | Performed by: FAMILY MEDICINE

## 2024-08-20 PROCEDURE — 3075F SYST BP GE 130 - 139MM HG: CPT | Performed by: FAMILY MEDICINE

## 2024-08-20 PROCEDURE — 4004F PT TOBACCO SCREEN RCVD TLK: CPT | Performed by: FAMILY MEDICINE

## 2024-08-20 PROCEDURE — 1123F ACP DISCUSS/DSCN MKR DOCD: CPT | Performed by: FAMILY MEDICINE

## 2024-08-20 PROCEDURE — 99214 OFFICE O/P EST MOD 30 MIN: CPT | Performed by: FAMILY MEDICINE

## 2024-08-20 PROCEDURE — G8417 CALC BMI ABV UP PARAM F/U: HCPCS | Performed by: FAMILY MEDICINE

## 2024-08-20 PROCEDURE — 3079F DIAST BP 80-89 MM HG: CPT | Performed by: FAMILY MEDICINE

## 2024-08-20 RX ORDER — CELECOXIB 200 MG/1
200 CAPSULE ORAL DAILY
Qty: 90 CAPSULE | Refills: 3 | Status: SHIPPED | OUTPATIENT
Start: 2024-08-20

## 2024-08-20 RX ORDER — HYDROCODONE BITARTRATE AND ACETAMINOPHEN 7.5; 325 MG/1; MG/1
TABLET ORAL
COMMUNITY
Start: 2024-08-12

## 2024-08-20 ASSESSMENT — ENCOUNTER SYMPTOMS
SHORTNESS OF BREATH: 0
NAUSEA: 0
VOMITING: 0

## 2024-08-20 NOTE — PROGRESS NOTES
Never   Substance Use Topics    Alcohol use: Yes     Alcohol/week: 4.0 standard drinks of alcohol     Types: 4 Cans of beer per week         Review of Systems   Constitutional:  Negative for fever.   Respiratory:  Negative for shortness of breath.    Cardiovascular:  Negative for chest pain.   Gastrointestinal:  Negative for nausea and vomiting.   Musculoskeletal:         Right shoulder and hip pain         OBJECTIVE:  /86 (Site: Left Upper Arm, Position: Sitting, Cuff Size: Large Adult)   Pulse 77   Ht 1.829 m (6')   Wt 112.9 kg (249 lb)   BMI 33.77 kg/m²      Physical Exam  Vitals and nursing note reviewed.   Constitutional:       Appearance: Normal appearance.   Cardiovascular:      Rate and Rhythm: Normal rate.      Heart sounds: Normal heart sounds.   Pulmonary:      Breath sounds: Normal breath sounds.   Musculoskeletal:      Right shoulder: Tenderness present. Decreased range of motion.      Left shoulder: Normal.      Right hip: Tenderness present. Decreased range of motion.      Left hip: Normal.   Neurological:      General: No focal deficit present.      Mental Status: He is alert and oriented to person, place, and time.   Psychiatric:         Mood and Affect: Mood normal.         Behavior: Behavior normal.          Medical problems and test results were reviewed with the patient today.     No results found for this or any previous visit (from the past 672 hour(s)).    ASSESSMENT and PLAN    Visit Diagnoses and Associated Orders       Chronic right hip pain    -  Primary    XR HIP 2-3 VW W PELVIS RIGHT [98577 CPT(R)]   - Future Order    celecoxib (CELEBREX) 200 MG capsule [[id number]]           Chronic right shoulder pain        XR SHOULDER RIGHT (MIN 2 VIEWS) [87327 CPT(R)]   - Future Order    celecoxib (CELEBREX) 200 MG capsule [[id number]]           ORDERS WITHOUT AN ASSOCIATED DIAGNOSIS    HYDROcodone-acetaminophen (NORCO) 7.5-325 MG per tablet [94449]                  Diagnosis Orders   1. Chronic

## 2024-08-20 NOTE — TELEPHONE ENCOUNTER
Per dr. Chiang notify patient that xr shows arthritis. Does he want to try arthritis meds and PT first before being referred to Ortho? Patient amendable to PT and meds.

## 2024-08-21 ENCOUNTER — TELEPHONE (OUTPATIENT)
Dept: FAMILY MEDICINE CLINIC | Facility: CLINIC | Age: 64
End: 2024-08-21

## 2024-08-21 DIAGNOSIS — G89.29 CHRONIC RIGHT HIP PAIN: Primary | ICD-10-CM

## 2024-08-21 DIAGNOSIS — M25.551 CHRONIC RIGHT HIP PAIN: Primary | ICD-10-CM

## 2024-08-21 DIAGNOSIS — M25.511 CHRONIC RIGHT SHOULDER PAIN: ICD-10-CM

## 2024-08-21 DIAGNOSIS — G89.29 CHRONIC RIGHT SHOULDER PAIN: ICD-10-CM

## 2024-08-21 NOTE — TELEPHONE ENCOUNTER
Patient was called to let him know about his x-rays wants to proceed with medication and physical therapy

## 2024-08-28 ENCOUNTER — HOSPITAL ENCOUNTER (OUTPATIENT)
Dept: PHYSICAL THERAPY | Age: 64
Setting detail: RECURRING SERIES
Discharge: HOME | End: 2024-08-31
Attending: FAMILY MEDICINE
Payer: MEDICARE

## 2024-08-28 DIAGNOSIS — M25.511 CHRONIC RIGHT SHOULDER PAIN: Primary | ICD-10-CM

## 2024-08-28 DIAGNOSIS — G89.29 CHRONIC RIGHT SHOULDER PAIN: Primary | ICD-10-CM

## 2024-08-28 DIAGNOSIS — M25.551 PAIN IN RIGHT HIP: ICD-10-CM

## 2024-08-28 PROCEDURE — 97161 PT EVAL LOW COMPLEX 20 MIN: CPT

## 2024-08-28 PROCEDURE — 97140 MANUAL THERAPY 1/> REGIONS: CPT

## 2024-08-28 NOTE — PROGRESS NOTES
Anam Thomson  : 1960  Primary: Medicare Part A And B (Medicare)  Secondary: [employer] S[employer]IOR MEDICARE SUPP [employer]  [address]  [address]  [facility] [address]  Phone: [phone]  Fax: [phone] Plan Frequency: 1-2 times per week  Plan of Care/Certification Expiration Date: 24        Plan of Care/Certification Expiration Date:  Plan of Care/Certification Expiration Date: 24    Frequency/Duration: Plan Frequency: 1-2 times per week      Time In/Out:   Time In: 1600  Time Out: 1645      PT Visit Info:         Visit Count:  1    OUTPATIENT PHYSICAL THERAPY:   Treatment Note 2024       Episode  (R shoulder, R hip pain)               Treatment Diagnosis:    Chronic right shoulder pain  Pain in right hip  Medical/Referring Diagnosis:    Chronic right hip pain [M25.551, G89.29]  Chronic right shoulder pain [M25.511, G89.29]      Referring Physician:  Harpreet Chiang DO MD Orders:  PT Eval and Treat   Return MD Appt:     Date of Onset:  Onset Date:  (chronic)     Allergies:   Patient has no known allergies.  Restrictions/Precautions:   None      Interventions Planned (Treatment may consist of any combination of the following):     See Assessment Note    Subjective Comments:   Pt reports hip and shoulder pain  Initial Pain Level::     5/10  Post Session Pain Level:       5/10  Medications Last Reviewed:  2024  Updated Objective Findings:  See Evaluation Note from today  Treatment       Therapeutic Exercise ( min): Exercises per grid below to improve mobility and strength.        Date:   Date:   Date:     Activity/Exercise Parameters Parameters Parameters         Piriformis stretch X 10           Table slide for passive shoulder flexion X 10     Band ER X 10, ytb                     Manual Therapy ( 10 min): Manual therapy techniques utilized and necessary because of the patient's functional and movement restrictions.     Date:        Techniques utilized PROM

## 2024-08-29 ASSESSMENT — PAIN SCALES - GENERAL: PAINLEVEL_OUTOF10: 5

## 2024-08-29 NOTE — THERAPY EVALUATION
Medical Necessity:   > Skilled intervention continues to be required due to decreased function.  Reason For Services/Other Comments:  > Patient continues to require skilled intervention due to decreased function.      Regarding Anam Thomson's therapy, I certify that the treatment plan above will be carried out by a therapist or under their direction.  Thank you for this referral,  Edu So, PT     Referring Physician Signature: Harpreet Chiang, DO                    Charge Capture  Events  Appt Desk  Attendance Report

## 2024-09-04 ENCOUNTER — HOSPITAL ENCOUNTER (OUTPATIENT)
Dept: PHYSICAL THERAPY | Age: 64
Setting detail: RECURRING SERIES
Discharge: HOME | End: 2024-09-07
Attending: FAMILY MEDICINE
Payer: MEDICARE

## 2024-09-04 PROCEDURE — 97140 MANUAL THERAPY 1/> REGIONS: CPT

## 2024-09-04 PROCEDURE — 97110 THERAPEUTIC EXERCISES: CPT

## 2024-09-04 ASSESSMENT — PAIN SCALES - GENERAL: PAINLEVEL_OUTOF10: 2

## 2024-09-04 NOTE — PROGRESS NOTES
Anam Thomson  : 1960  Primary: Medicare Part A And B (Medicare)  Secondary: [employer] S[employer]IOR MEDIC[employer]  [address]  [address]  [facility] [address]  Phone: [phone]  Fax: [phone] Plan Frequency: 1-2 times per week  Plan of Care/Certification Expiration Date: 24        Plan of Care/Certification Expiration Date:  Plan of Care/Certification Expiration Date: 24    Frequency/Duration: Plan Frequency: 1-2 times per week      Time In/Out:   Time In: 1600  Time Out: 1645      PT Visit Info:    Total # of Visits to Date: 2      Visit Count:  2    OUTPATIENT PHYSICAL THERAPY:   Treatment Note 2024       Episode  (R shoulder, R hip pain)               Treatment Diagnosis:    Chronic right shoulder pain  Pain in right hip  Medical/Referring Diagnosis:    Chronic right hip pain [M25.551, G89.29]  Chronic right shoulder pain [M25.511, G89.29]      Referring Physician:  Harpreet Chiang DO MD Orders:  PT Eval and Treat   Return MD Appt:     Date of Onset:  Onset Date:  (chronic)     Allergies:   Patient has no known allergies.  Restrictions/Precautions:   None      Interventions Planned (Treatment may consist of any combination of the following):     See Assessment Note    Subjective Comments:   Pt reports that his hip has been hurting a good amount, had him awake for most of the night because of the pain.    Initial Pain Level::     2/10  Post Session Pain Level:       2/10  Medications Last Reviewed:  2024  Updated Objective Findings:  None Today  Treatment       Therapeutic Exercise (30 min): Exercises per grid below to improve mobility and strength.        Date:   Date:   Date:     Activity/Exercise Parameters Parameters Parameters   NuStep   L2 x 6 min for ROM     Piriformis stretch X 10 X 10     Shoulder flexion AAROM  X 10 w/ 2# dowel     Table slide for passive shoulder flexion X 10     Band ER X 10, ytb X 10 YTB    Supine bridges   2 x 10 w/

## 2024-09-09 ENCOUNTER — HOSPITAL ENCOUNTER (OUTPATIENT)
Dept: PHYSICAL THERAPY | Age: 64
Setting detail: RECURRING SERIES
Discharge: HOME | End: 2024-09-12
Attending: FAMILY MEDICINE
Payer: MEDICARE

## 2024-09-09 PROCEDURE — 97110 THERAPEUTIC EXERCISES: CPT

## 2024-09-09 PROCEDURE — 97140 MANUAL THERAPY 1/> REGIONS: CPT

## 2024-09-10 ASSESSMENT — PAIN SCALES - GENERAL: PAINLEVEL_OUTOF10: 5

## 2024-09-11 ENCOUNTER — APPOINTMENT (OUTPATIENT)
Dept: PHYSICAL THERAPY | Age: 64
End: 2024-09-11
Attending: FAMILY MEDICINE
Payer: MEDICARE

## 2024-09-16 ENCOUNTER — HOSPITAL ENCOUNTER (OUTPATIENT)
Dept: PHYSICAL THERAPY | Age: 64
Setting detail: RECURRING SERIES
Discharge: HOME | End: 2024-09-19
Attending: FAMILY MEDICINE
Payer: MEDICARE

## 2024-09-16 PROCEDURE — 97140 MANUAL THERAPY 1/> REGIONS: CPT

## 2024-09-16 PROCEDURE — 97110 THERAPEUTIC EXERCISES: CPT

## 2024-09-16 ASSESSMENT — PAIN SCALES - GENERAL: PAINLEVEL_OUTOF10: 5

## 2024-09-18 ENCOUNTER — HOSPITAL ENCOUNTER (OUTPATIENT)
Dept: PHYSICAL THERAPY | Age: 64
Setting detail: RECURRING SERIES
End: 2024-09-18
Attending: FAMILY MEDICINE
Payer: MEDICARE

## 2024-09-23 ENCOUNTER — HOSPITAL ENCOUNTER (OUTPATIENT)
Dept: PHYSICAL THERAPY | Age: 64
Setting detail: RECURRING SERIES
Discharge: HOME | End: 2024-09-26
Attending: FAMILY MEDICINE
Payer: MEDICARE

## 2024-09-23 PROCEDURE — 97140 MANUAL THERAPY 1/> REGIONS: CPT

## 2024-09-23 PROCEDURE — 97110 THERAPEUTIC EXERCISES: CPT

## 2024-09-23 ASSESSMENT — PAIN SCALES - GENERAL: PAINLEVEL_OUTOF10: 5

## 2024-09-25 ENCOUNTER — HOSPITAL ENCOUNTER (OUTPATIENT)
Dept: PHYSICAL THERAPY | Age: 64
Setting detail: RECURRING SERIES
End: 2024-09-25
Attending: FAMILY MEDICINE
Payer: MEDICARE

## 2024-09-26 ENCOUNTER — TELEPHONE (OUTPATIENT)
Dept: FAMILY MEDICINE CLINIC | Facility: CLINIC | Age: 64
End: 2024-09-26

## 2024-09-26 DIAGNOSIS — M25.551 RIGHT HIP PAIN: Primary | ICD-10-CM

## 2024-09-30 ENCOUNTER — APPOINTMENT (OUTPATIENT)
Dept: PHYSICAL THERAPY | Age: 64
End: 2024-09-30
Attending: FAMILY MEDICINE
Payer: MEDICARE

## 2024-10-02 ENCOUNTER — HOSPITAL ENCOUNTER (OUTPATIENT)
Dept: PHYSICAL THERAPY | Age: 64
Setting detail: RECURRING SERIES
End: 2024-10-02
Attending: FAMILY MEDICINE
Payer: MEDICARE

## 2024-10-07 ENCOUNTER — OFFICE VISIT (OUTPATIENT)
Dept: ORTHOPEDIC SURGERY | Age: 64
End: 2024-10-07
Payer: MEDICARE

## 2024-10-07 ENCOUNTER — HOSPITAL ENCOUNTER (OUTPATIENT)
Dept: PHYSICAL THERAPY | Age: 64
Setting detail: RECURRING SERIES
End: 2024-10-07
Attending: FAMILY MEDICINE
Payer: MEDICARE

## 2024-10-07 DIAGNOSIS — M70.61 TROCHANTERIC BURSITIS OF RIGHT HIP: Primary | ICD-10-CM

## 2024-10-07 PROCEDURE — 99203 OFFICE O/P NEW LOW 30 MIN: CPT | Performed by: NURSE PRACTITIONER

## 2024-10-07 PROCEDURE — 20610 DRAIN/INJ JOINT/BURSA W/O US: CPT | Performed by: NURSE PRACTITIONER

## 2024-10-07 PROCEDURE — 3017F COLORECTAL CA SCREEN DOC REV: CPT | Performed by: NURSE PRACTITIONER

## 2024-10-07 PROCEDURE — 1123F ACP DISCUSS/DSCN MKR DOCD: CPT | Performed by: NURSE PRACTITIONER

## 2024-10-07 PROCEDURE — 4004F PT TOBACCO SCREEN RCVD TLK: CPT | Performed by: NURSE PRACTITIONER

## 2024-10-07 PROCEDURE — G8484 FLU IMMUNIZE NO ADMIN: HCPCS | Performed by: NURSE PRACTITIONER

## 2024-10-07 PROCEDURE — G8417 CALC BMI ABV UP PARAM F/U: HCPCS | Performed by: NURSE PRACTITIONER

## 2024-10-07 PROCEDURE — G8428 CUR MEDS NOT DOCUMENT: HCPCS | Performed by: NURSE PRACTITIONER

## 2024-10-07 RX ORDER — METHYLPREDNISOLONE ACETATE 40 MG/ML
40 INJECTION, SUSPENSION INTRA-ARTICULAR; INTRALESIONAL; INTRAMUSCULAR; SOFT TISSUE ONCE
Status: COMPLETED | OUTPATIENT
Start: 2024-10-07 | End: 2024-10-07

## 2024-10-07 RX ADMIN — METHYLPREDNISOLONE ACETATE 40 MG: 40 INJECTION, SUSPENSION INTRA-ARTICULAR; INTRALESIONAL; INTRAMUSCULAR; SOFT TISSUE at 11:01

## 2024-10-07 NOTE — PROGRESS NOTES
Patient ID:    Anam Thomson  [id number]  63 y.o.  1960    Today: October 7, 2024      Chief Complaint: Right hip pain        Patient reports longstanding history of right hip pain.  The pain is predominately localized to the lateral hip and is characterized as a general ache with occasional sharp pain.  They rate the pain ranging from 3-8 on the pain scale occurring in a cyclical fashion with periods of acute exacerbation. Symptoms are exacerbated with attempting to sleep on the hip, attempting to ascend stairs, and sitting for long periods of time which results in lateral hip pain. Patient denies significant anterior groin pain and denies significant issues with attempting to put on socks and shoes or when attempting to get into or out of a vehicle. No numbness of tingling going down the extremity.  Patient has attempted prior conservative treatment including Physical Therapy and NSAIDS.      Past Medical History:  Past Medical History:   Diagnosis Date    Atrial fibrillation with rapid ventricular response (HCC) 9/5/2018    CKD (chronic kidney disease), stage III (HCC)     Elevated PSA     Hypercholesterolemia     Hypertension 2007    Kidney disease     Pyoderma gangrenosum 9/5/2018; 6/25/24    Currently being treated for this condition - R foot       Past Surgical History:  Past Surgical History:   Procedure Laterality Date    EP DEVICE PROCEDURE N/A 02/19/2024    Ablation A-fib w complete ep study performed by Antwon Barnett MD at [facility] CARDIAC CATH LAB    PROSTATE BIOPSY N/A 7/12/2024    MRI FUSION PROSTATE BIOPSY performed by Jcarlos Avalos MD at [facility] MAIN OR        Medications:     Prior to Admission medications    Medication Sig Start Date End Date Taking? Authorizing Provider   HYDROcodone-acetaminophen (NORCO) 7.5-325 MG per tablet TAKE 1 TABLET BY MOUTH EVERY 6 HOURS AS NEEDED FOR 10 DAYS 8/12/24   Provider, MD Aldo   celecoxib (CELEBREX) 200 MG capsule Take 1 capsule by

## 2024-10-09 ENCOUNTER — HOSPITAL ENCOUNTER (OUTPATIENT)
Dept: PHYSICAL THERAPY | Age: 64
Setting detail: RECURRING SERIES
Discharge: HOME | End: 2024-10-12
Attending: FAMILY MEDICINE
Payer: MEDICARE

## 2024-10-09 PROCEDURE — 97110 THERAPEUTIC EXERCISES: CPT

## 2024-10-09 PROCEDURE — 97140 MANUAL THERAPY 1/> REGIONS: CPT

## 2024-10-09 ASSESSMENT — PAIN SCALES - GENERAL: PAINLEVEL_OUTOF10: 5

## 2024-10-09 NOTE — PROGRESS NOTES
Anam Thomson  : 1960  Primary: Medicare Part A And B (Medicare)  Secondary: [employer] S[employer]IOR [employer]IC[employer]  [address]  [address]  [facility] [address]  Phone: [phone]  Fax: [phone] Plan Frequency: 1-2 times per week  Plan of Care/Certification Expiration Date: 24        Plan of Care/Certification Expiration Date:  Plan of Care/Certification Expiration Date: 24    Frequency/Duration: Plan Frequency: 1-2 times per week      Time In/Out:   Time In: 1645  Time Out: 1730      PT Visit Info:    Total # of Visits to Date: 2  Canceled Appointment: 1      Visit Count:  6    OUTPATIENT PHYSICAL THERAPY:   Treatment Note 10/9/2024       Episode  (R shoulder, R hip pain)               Treatment Diagnosis:    Chronic right shoulder pain  Pain in right hip  Medical/Referring Diagnosis:    Chronic right hip pain [M25.551, G89.29]  Chronic right shoulder pain [M25.511, G89.29]      Referring Physician:  Harpreet Chiang DO MD Orders:  PT Eval and Treat   Return MD Appt:     Date of Onset:  Onset Date:  (chronic)     Allergies:   Patient has no known allergies.  Restrictions/Precautions:   None      Interventions Planned (Treatment may consist of any combination of the following):     See Assessment Note    Subjective Comments:   Pt reports soreness in hip. But shoulder has been improving.   Initial Pain Level::     5/10  Post Session Pain Level:       5/10  Medications Last Reviewed:  10/9/2024  Updated Objective Findings:   shoulder elevation 130 deg  Treatment       Therapeutic Exercise (30 min): Exercises per grid below to improve mobility and strength.        9/16 9/23 10/9   Activity/Exercise      NuStep  L2 x 8 min for ROM L2 x 8 min for ROM Ube 3/3 level 1   Piriformis stretch      Shoulder flexion AAROM Standing 2 x 10 with wand Standing 2 x 10 with wand Standing 2 x 10 with wand   Table slide for passive shoulder flexion      Band ER Isometric walk aways x 10

## 2025-01-23 ENCOUNTER — LAB (OUTPATIENT)
Dept: UROLOGY | Age: 65
End: 2025-01-23

## 2025-01-23 DIAGNOSIS — R97.20 ELEVATED PSA: ICD-10-CM

## 2025-01-23 LAB — PSA SERPL-MCNC: 4.9 NG/ML (ref 0–4)

## 2025-01-30 ENCOUNTER — OFFICE VISIT (OUTPATIENT)
Dept: UROLOGY | Age: 65
End: 2025-01-30
Payer: MEDICARE

## 2025-01-30 DIAGNOSIS — R97.20 ELEVATED PSA: Primary | ICD-10-CM

## 2025-01-30 DIAGNOSIS — N13.8 BPH WITH OBSTRUCTION/LOWER URINARY TRACT SYMPTOMS: ICD-10-CM

## 2025-01-30 DIAGNOSIS — N40.1 BPH WITH OBSTRUCTION/LOWER URINARY TRACT SYMPTOMS: ICD-10-CM

## 2025-01-30 LAB
BILIRUBIN, URINE, POC: NEGATIVE
BLOOD URINE, POC: NEGATIVE
GLUCOSE URINE, POC: NEGATIVE MG/DL
KETONES, URINE, POC: NEGATIVE MG/DL
LEUKOCYTE ESTERASE, URINE, POC: NEGATIVE
NITRITE, URINE, POC: NEGATIVE
PH, URINE, POC: 6.5 (ref 4.6–8)
PROTEIN,URINE, POC: NEGATIVE MG/DL
SPECIFIC GRAVITY, URINE, POC: 1.02 (ref 1–1.03)
URINALYSIS CLARITY, POC: NORMAL
URINALYSIS COLOR, POC: NORMAL
UROBILINOGEN, POC: NORMAL MG/DL

## 2025-01-30 PROCEDURE — 81003 URINALYSIS AUTO W/O SCOPE: CPT | Performed by: UROLOGY

## 2025-01-30 PROCEDURE — 1159F MED LIST DOCD IN RCRD: CPT | Performed by: UROLOGY

## 2025-01-30 PROCEDURE — 1123F ACP DISCUSS/DSCN MKR DOCD: CPT | Performed by: UROLOGY

## 2025-01-30 PROCEDURE — G8417 CALC BMI ABV UP PARAM F/U: HCPCS | Performed by: UROLOGY

## 2025-01-30 PROCEDURE — 3017F COLORECTAL CA SCREEN DOC REV: CPT | Performed by: UROLOGY

## 2025-01-30 PROCEDURE — G8427 DOCREV CUR MEDS BY ELIG CLIN: HCPCS | Performed by: UROLOGY

## 2025-01-30 PROCEDURE — 4004F PT TOBACCO SCREEN RCVD TLK: CPT | Performed by: UROLOGY

## 2025-01-30 PROCEDURE — 99213 OFFICE O/P EST LOW 20 MIN: CPT | Performed by: UROLOGY

## 2025-01-30 PROCEDURE — 1160F RVW MEDS BY RX/DR IN RCRD: CPT | Performed by: UROLOGY

## 2025-01-30 RX ORDER — FINASTERIDE 5 MG/1
5 TABLET, FILM COATED ORAL DAILY
Qty: 90 TABLET | Refills: 3 | Status: SHIPPED | OUTPATIENT
Start: 2025-01-30

## 2025-01-30 ASSESSMENT — ENCOUNTER SYMPTOMS: BACK PAIN: 0

## 2025-01-30 NOTE — PROGRESS NOTES
[facility]  [address] [address]  [phone]          Anam Thomson  : 1960    Chief Complaint   Patient presents with    Follow-up     6 mo          HPI     Anam Thomson is a 64 y.o. male referred by Dr. Chiang in regards to elevated psa.  He denies family history of prostate cancer.  He has only minimal LUTS.  He was treated for prostatitis after 10/20 psa.  He denies gross hematuria.       He was lost to follow up from  to .       MRI 24 revealed a 67 gr prostate with 3 PIRADS 4 lesions.  Uronav prostate biopsy 24 revealed a 67 gram gland and all cores were benign.      Finasteride was begun in .       PSA: 10/26/20 9.0, 21 5.5,  6.7,  5.6, 3/24 7.2, finasteride,  4.9          Past Medical History:   Diagnosis Date    Atrial fibrillation with rapid ventricular response (HCC) 2018    CKD (chronic kidney disease), stage III (HCC)     Elevated PSA     Hypercholesterolemia     Hypertension 2007    Kidney disease     Pyoderma gangrenosum 2018; 24    Currently being treated for this condition - R foot     Past Surgical History:   Procedure Laterality Date    EP DEVICE PROCEDURE N/A 2024    Ablation A-fib w complete ep study performed by Antwon Barnett MD at [facility] CARDIAC CATH LAB    PROSTATE BIOPSY N/A 2024    MRI FUSION PROSTATE BIOPSY performed by Jcarlos Avalos MD at [facility] MAIN OR     Current Outpatient Medications   Medication Sig Dispense Refill    finasteride (PROSCAR) 5 MG tablet Take 1 tablet by mouth daily 90 tablet 3    celecoxib (CELEBREX) 200 MG capsule Take 1 capsule by mouth daily 90 capsule 3    losartan (COZAAR) 100 MG tablet Take 1 tablet by mouth daily Thinks he takes but not sure      metoprolol succinate (TOPROL XL) 100 MG extended release tablet TAKE 1 TABLET BY MOUTH EVERY DAY 90 tablet 3    ELIQUIS 5 MG TABS tablet TAKE 1 TABLET BY MOUTH 2 TIMES DAILY

## 2025-02-04 NOTE — H&P
HISTORY AND PHYSICAL             Date: 7/12/2024        Patient Name: Anam Thomson     YOB: 1960      Age:  63 y.o.      History of Present Illness     63 y.o.   male referred by Dr. Chiang in regards to elevated psa.  He denies family history of prostate cancer.  He has only minimal LUTS.  He was treated for prostatitis after 10/20 psa.  He denies gross hematuria.       He was lost to follow up from 7/21 to 5/24.      MRI 5/29/24 revealed a 67 gr prostate with 3 PIRADS 4 lesions.     PSA: 10/26/20 9.0, 1/7/21 5.5, 7/21 6.7, 2/24 5.6, 3/24 7.2    Past Medical History     Past Medical History:   Diagnosis Date    Atrial fibrillation with rapid ventricular response (HCC) 9/5/2018    CKD (chronic kidney disease), stage III (HCC)     Elevated PSA     Hypercholesterolemia     Hypertension 2007    Kidney disease     Pyoderma gangrenosum 9/5/2018; 6/25/24    Currently being treated for this condition - R foot        Past Surgical History     Past Surgical History:   Procedure Laterality Date    EP DEVICE PROCEDURE N/A 02/19/2024    Ablation A-fib w complete ep study performed by Antwon Barnett MD at [facility] CARDIAC CATH LAB        Medications Prior to Admission     Prior to Admission medications    Medication Sig Start Date End Date Taking? Authorizing Provider   losartan (COZAAR) 100 MG tablet Take 1 tablet by mouth daily Thinks he takes but not sure    Aldo Ferro MD   predniSONE (DELTASONE) 10 MG tablet Take 2 tablets by mouth 3 times daily 4/11/24   Aldo Ferro MD   RESTASIS 0.05 % ophthalmic emulsion Uses on foot 5/9/24 9/6/24  ProviderAldo MD   metoprolol succinate (TOPROL XL) 100 MG extended release tablet TAKE 1 TABLET BY MOUTH EVERY DAY 3/19/24   Harpreet Chiang DO   ELIQUIS 5 MG TABS tablet TAKE 1 TABLET BY MOUTH 2 TIMES DAILY TAKE 1 TABLET BY MOUTH TWICE A DAY 3/11/24   Alma Rosa Franklin, APRN - CNP   olmesartan-hydroCHLOROthiazide (BENICAR HCT) 40-25 MG per  PT Evaluation     Today's date: 2025  Patient name: Dasha Da Silva  : 2006  MRN: [id number]  Referring provider: Allison Mccoy DO  Dx:   Encounter Diagnosis     ICD-10-CM    1. Mid back pain  M54.9       2. Chronic bilateral low back pain without sciatica  M54.50     G89.29       3. Poor posture  R29.3       4. Juvenile idiopathic scoliosis of thoracolumbar region  M41.115                      Assessment  Impairments: abnormal muscle firing, abnormal muscle tone, abnormal or restricted ROM, abnormal movement, activity intolerance, impaired physical strength, lacks appropriate home exercise program, pain with function, scapular dyskinesis, weight-bearing intolerance, poor posture , poor body mechanics and endurance  Symptom irritability: moderate    Assessment details: Dasha Da Silva is a 18 y.o. female presenting to outpatient physical therapy at [facility] with complaints of chronic mid back, low back, and bilateral hip pain. She reports being diagnosed with scoliosis as a child.   She presents with decreased range of motion, decreased strength, limited flexibility, poor postural awareness, poor body mechanics, altered gait pattern, poor balance, decreased tolerance to activity and decreased functional mobility due to mid back and low back pain as a result of her scoliosis.  She would benefit from skilled PT services in order to address these deficits and reach maximum level of function.  Thank you for the referral!   Understanding of Dx/Px/POC: good     Prognosis: good    Goals  STGs (in 4 weeks):  1. Pt will report having at least a 50% improvement since I.E.   2. Pt will report having at most a 2/10 pain level with functional mobility.   3. Pt will have at least 4-4+/5 MMT grade throughout mid back, core, and BLEs.     LTGs (in 12 weeks):  1. Pt will report having at least a 75% improvement since I.E  2. Pt will be independent with HEP.   3. Pt will be able to sit and stand for prolonged  periods with good postural awareness with less reports of pain for work and in class.     Plan  Patient would benefit from: home program, skilled physical therapy and PT eval  Planned modality interventions: unattended electrical stimulation and TENS    Planned therapy interventions: joint mobilization, kinesiology taping, activity modification, manual therapy, neuromuscular re-education, nerve gliding, balance, self care, strengthening, stretching, flexibility, therapeutic activities, therapeutic training, therapeutic exercise, gait training and home exercise program    Frequency: 2x week  Plan of Care beginning date: 2025  Plan of Care expiration date: 2025  Treatment plan discussed with: patient      Subjective Evaluation    History of Present Illness  Mechanism of injury: Pt is a 18 year old female who presents with chronic progressive pain that increased approximately 8 months ago. She reports having pain that increases in low back and hips while at work with prolonged sitting and standing. She has a sit-stand desk that she uses. She reports that she has to twist her low back to relief the pressure. If she performs a posterior pelvic tilt, she has relief in her low back while sitting. She also reports having increased pain when lying on her back in bed so she has to lie on her sides. 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 Patient reports that she was diagnoses with scoliosis when she was a child. No previous PT. Now referred to skilled OP PT.     Childhood - no surgeries - no PT. Chiropractor   Quality of life: good    Patient Goals  Patient goals for therapy: decreased pain, improved balance, increased motion, increased strength, independence with ADLs/IADLs and return to sport/leisure activities    Pain  Current pain ratin  At best pain ratin  At worst pain ratin  Pain location: centralized.  Quality: dull ache and pressure  Relieving factors: heat and medications  (Tylenol)  Aggravating factors: sitting and standing  Progression: no change    Hand dominance: right      Diagnostic Tests  X-ray: abnormal      Objective      Posture: Lumbar lordosis is increased in standing. There is no lateral shift. Her spine is curved to the right (reverse C curve) with no observed rib hump however R scapular is depressed.        Lumbar AROM limitations:  (*=  Pain)  Lumbar flexion: Min loss  Lumbar extension: Mod loss  R side glide:  Mod loss  L side glide:  Mod loss    Mechanical Asessment: pre-test symtpoms include NT  Repeated Extension in Standing (CHELE): NT  Repeated Extension in Lying (REIL):  NT    Strength:     Right  Left  UT   3+/5  3+/5  MT   3+/5  3/5  LT   3/5  3/5      Core strength: Upper abs: 3-/5; lower abs: 3-/5     Right  Left  Hip flexion:  4/5  4/5  Knee ext  4/5  4/5  Ankle DF  4/5  4/5  Great toe ext  4/5  4/5  Ankle PF  4/5  4/5  Knee flex  4/5  4/5      Hip abduction  3+/5  3+/5  Hip adduction  4-/5  4-/5  Hip extension  3+/5  3+/5    Joint mobility: decreased PA glides in thoracic and lumbar however mm guarding    Tenderness/Palpation: TTP along SP of thoracic and lumbar    Sensation: intact throughout light touch with BLEs     Flexibility: (+) SLR (L worse than R);     Function: Pt squats with increased forward trunk flexion         Daily Treatment Diary     Precautions: childhood scoliosis    FOTO Completed On:     POC Expires Reeval for Medicare to be completed  Unit Limit Auth Expiration Date PT/OT/STVisit Limit   4/29/2025 By visit ALEXANDRA MORRIS 12/31/2025 30    Completed on visit: ALEXANDRA                   Auth Status DATE 2/4        Approved Visit # 1         Remaining 29        MANUAL THERAPY                                                               THERAPEUTIC EXERCISE HEP         Sidelying thoracic rotation stretch          Seated thoracic extension over half foam                    Seated HS stretch          Seated piriformis stretch                    Scap  Retraction          Chin tucks 2 sec x 10 2 sec x 5 in supine        Mod pec stretch                              Prone T          Prone Y          Prone I          Prone W                    NEUROMUSCULAR REEDUCATION           TAC 5 sec x 10 5 sec x 3 in supine        TAC c walk outs (R out, L out, R in, L in)          TAC c mod march (R up, R down, L up, L down)          TAC c Ball Squeezes          Ball with Bridge          TAC c TB Hooklying Clamshells          TB Bridges          TAC c Dying Bug                    Quadruped TAC          Quadruped TAC c Alt UT          Quadruped TAC c Alt LEs          Quadruped TAC UE and alt LE                    Prone Plank          Sidelying Plank          Sidelying Plank with clamshells                              TB Scap Retraction GTB 2 sec x 10         TB B S' Ext GTB 2 sec x 10         TB B ER          TB Horiz ABD          TB Pallof Press                    Sciatic N glide                              THERAPEUTIC ACTIVITY          UBE (retrograde)                                        GAIT TRAINING                                                  MODALITIES

## 2025-02-12 SDOH — ECONOMIC STABILITY: FOOD INSECURITY: WITHIN THE PAST 12 MONTHS, THE FOOD YOU BOUGHT JUST DIDN'T LAST AND YOU DIDN'T HAVE MONEY TO GET MORE.: NEVER TRUE

## 2025-02-12 SDOH — ECONOMIC STABILITY: INCOME INSECURITY: IN THE LAST 12 MONTHS, WAS THERE A TIME WHEN YOU WERE NOT ABLE TO PAY THE MORTGAGE OR RENT ON TIME?: NO

## 2025-02-12 SDOH — HEALTH STABILITY: PHYSICAL HEALTH: ON AVERAGE, HOW MANY MINUTES DO YOU ENGAGE IN EXERCISE AT THIS LEVEL?: 150+ MIN

## 2025-02-12 SDOH — HEALTH STABILITY: PHYSICAL HEALTH: ON AVERAGE, HOW MANY DAYS PER WEEK DO YOU ENGAGE IN MODERATE TO STRENUOUS EXERCISE (LIKE A BRISK WALK)?: 4 DAYS

## 2025-02-12 SDOH — ECONOMIC STABILITY: FOOD INSECURITY: WITHIN THE PAST 12 MONTHS, YOU WORRIED THAT YOUR FOOD WOULD RUN OUT BEFORE YOU GOT MONEY TO BUY MORE.: NEVER TRUE

## 2025-02-12 SDOH — ECONOMIC STABILITY: TRANSPORTATION INSECURITY
IN THE PAST 12 MONTHS, HAS THE LACK OF TRANSPORTATION KEPT YOU FROM MEDICAL APPOINTMENTS OR FROM GETTING MEDICATIONS?: NO

## 2025-02-12 ASSESSMENT — LIFESTYLE VARIABLES
HOW OFTEN DO YOU HAVE A DRINK CONTAINING ALCOHOL: 4
HOW OFTEN DO YOU HAVE SIX OR MORE DRINKS ON ONE OCCASION: 1
HOW MANY STANDARD DRINKS CONTAINING ALCOHOL DO YOU HAVE ON A TYPICAL DAY: 1
HOW MANY STANDARD DRINKS CONTAINING ALCOHOL DO YOU HAVE ON A TYPICAL DAY: 1 OR 2
HOW OFTEN DO YOU HAVE A DRINK CONTAINING ALCOHOL: 2-3 TIMES A WEEK

## 2025-02-12 ASSESSMENT — PATIENT HEALTH QUESTIONNAIRE - PHQ9
SUM OF ALL RESPONSES TO PHQ QUESTIONS 1-9: 0
2. FEELING DOWN, DEPRESSED OR HOPELESS: NOT AT ALL
SUM OF ALL RESPONSES TO PHQ QUESTIONS 1-9: 0
SUM OF ALL RESPONSES TO PHQ QUESTIONS 1-9: 0
SUM OF ALL RESPONSES TO PHQ9 QUESTIONS 1 & 2: 0
1. LITTLE INTEREST OR PLEASURE IN DOING THINGS: NOT AT ALL
SUM OF ALL RESPONSES TO PHQ QUESTIONS 1-9: 0

## 2025-02-13 ENCOUNTER — OFFICE VISIT (OUTPATIENT)
Dept: FAMILY MEDICINE CLINIC | Facility: CLINIC | Age: 65
End: 2025-02-13

## 2025-02-13 VITALS
SYSTOLIC BLOOD PRESSURE: 122 MMHG | BODY MASS INDEX: 32.91 KG/M2 | WEIGHT: 243 LBS | HEART RATE: 76 BPM | HEIGHT: 72 IN | DIASTOLIC BLOOD PRESSURE: 88 MMHG

## 2025-02-13 DIAGNOSIS — Z13.6 ENCOUNTER FOR ABDOMINAL AORTIC ANEURYSM (AAA) SCREENING: ICD-10-CM

## 2025-02-13 DIAGNOSIS — G89.29 CHRONIC RIGHT HIP PAIN: ICD-10-CM

## 2025-02-13 DIAGNOSIS — G89.29 CHRONIC RIGHT SHOULDER PAIN: ICD-10-CM

## 2025-02-13 DIAGNOSIS — Z12.5 SPECIAL SCREENING, PROSTATE CANCER: ICD-10-CM

## 2025-02-13 DIAGNOSIS — I48.19 PERSISTENT ATRIAL FIBRILLATION (HCC): ICD-10-CM

## 2025-02-13 DIAGNOSIS — N18.30 STAGE 3 CHRONIC KIDNEY DISEASE, UNSPECIFIED WHETHER STAGE 3A OR 3B CKD (HCC): ICD-10-CM

## 2025-02-13 DIAGNOSIS — I48.20 CHRONIC ATRIAL FIBRILLATION (HCC): ICD-10-CM

## 2025-02-13 DIAGNOSIS — M25.551 CHRONIC RIGHT HIP PAIN: ICD-10-CM

## 2025-02-13 DIAGNOSIS — Z87.891 PERSONAL HISTORY OF NICOTINE DEPENDENCE: ICD-10-CM

## 2025-02-13 DIAGNOSIS — R97.20 ELEVATED PSA: ICD-10-CM

## 2025-02-13 DIAGNOSIS — Z00.00 INITIAL MEDICARE ANNUAL WELLNESS VISIT: Primary | ICD-10-CM

## 2025-02-13 DIAGNOSIS — I10 PRIMARY HYPERTENSION: ICD-10-CM

## 2025-02-13 DIAGNOSIS — M25.511 CHRONIC RIGHT SHOULDER PAIN: ICD-10-CM

## 2025-02-13 DIAGNOSIS — Z87.891 PERSONAL HISTORY OF TOBACCO USE: ICD-10-CM

## 2025-02-13 DIAGNOSIS — E78.01 FAMILIAL HYPERCHOLESTEROLEMIA: ICD-10-CM

## 2025-02-13 DIAGNOSIS — N52.9 ERECTILE DYSFUNCTION, UNSPECIFIED ERECTILE DYSFUNCTION TYPE: ICD-10-CM

## 2025-02-13 DIAGNOSIS — Z13.31 SCREENING FOR DEPRESSION: ICD-10-CM

## 2025-02-13 DIAGNOSIS — Z00.00 MEDICARE ANNUAL WELLNESS VISIT, SUBSEQUENT: ICD-10-CM

## 2025-02-13 DIAGNOSIS — M25.551 RIGHT HIP PAIN: ICD-10-CM

## 2025-02-13 DIAGNOSIS — Z00.00 ROUTINE GENERAL MEDICAL EXAMINATION AT A HEALTH CARE FACILITY: ICD-10-CM

## 2025-02-13 DIAGNOSIS — Z12.5 SPECIAL SCREENING FOR MALIGNANT NEOPLASM OF PROSTATE: ICD-10-CM

## 2025-02-13 DIAGNOSIS — Z12.11 SPECIAL SCREENING FOR MALIGNANT NEOPLASMS, COLON: ICD-10-CM

## 2025-02-13 LAB
ALBUMIN SERPL-MCNC: 3.9 G/DL (ref 3.2–4.6)
ALBUMIN/GLOB SERPL: 1.4 (ref 1–1.9)
ALP SERPL-CCNC: 132 U/L (ref 40–129)
ALT SERPL-CCNC: 21 U/L (ref 8–55)
ANION GAP SERPL CALC-SCNC: 15 MMOL/L (ref 7–16)
AST SERPL-CCNC: 20 U/L (ref 15–37)
BILIRUB SERPL-MCNC: 0.3 MG/DL (ref 0–1.2)
BILIRUBIN, URINE, POC: NEGATIVE
BLOOD URINE, POC: NEGATIVE
BUN SERPL-MCNC: 26 MG/DL (ref 8–23)
CALCIUM SERPL-MCNC: 9.5 MG/DL (ref 8.8–10.2)
CHLORIDE SERPL-SCNC: 102 MMOL/L (ref 98–107)
CHOLEST SERPL-MCNC: 139 MG/DL (ref 0–200)
CO2 SERPL-SCNC: 24 MMOL/L (ref 20–29)
CREAT SERPL-MCNC: 1.86 MG/DL (ref 0.8–1.3)
GLOBULIN SER CALC-MCNC: 2.9 G/DL (ref 2.3–3.5)
GLUCOSE SERPL-MCNC: 115 MG/DL (ref 70–99)
GLUCOSE URINE, POC: NEGATIVE
GRANS ABSOLUTE, POC: 5.4 K/UL
GRANULOCYTES %, POC: 54.4 %
HDLC SERPL-MCNC: 30 MG/DL (ref 40–60)
HDLC SERPL: 4.7 (ref 0–5)
HEMATOCRIT, POC: 46.6 %
HEMOGLOBIN, POC: 15.2 G/DL
KETONES, URINE, POC: NEGATIVE
LDLC SERPL CALC-MCNC: 70 MG/DL (ref 0–100)
LEUKOCYTE ESTERASE, URINE, POC: NEGATIVE
LYMPHOCYTE %, POC: 36.2 %
LYMPHS ABSOLUTE, POC: 3.6 K/UL
MCH, POC: 28.8 PG (ref 20–?)
MCHC, POC: 32.6
MCV, POC: 88.2
MONOCYTE %, POC: 9.4 %
MONOCYTE, ABSOLUTE POC: 0.9 K/UL
MPV, POC: 7.5 FL
NITRITE, URINE, POC: NEGATIVE
PH, URINE, POC: 6 (ref 4.6–8)
PLATELET COUNT, POC: 440 K/UL
POTASSIUM SERPL-SCNC: 3.9 MMOL/L (ref 3.5–5.1)
PROT SERPL-MCNC: 6.8 G/DL (ref 6.3–8.2)
PROTEIN,URINE, POC: NEGATIVE
RBC, POC: 5.28 M/UL
RDW, POC: 14 %
SODIUM SERPL-SCNC: 141 MMOL/L (ref 136–145)
SPECIFIC GRAVITY, URINE, POC: 1.02 (ref 1–1.03)
TRIGL SERPL-MCNC: 195 MG/DL (ref 0–150)
TSH W FREE THYROID IF ABNORMAL: 2.03 UIU/ML (ref 0.27–4.2)
URINALYSIS CLARITY, POC: CLEAR
URINALYSIS COLOR, POC: YELLOW
UROBILINOGEN, POC: NORMAL
VLDLC SERPL CALC-MCNC: 39 MG/DL (ref 6–23)
WBC, POC: 10 K/UL

## 2025-02-13 RX ORDER — CELECOXIB 200 MG/1
200 CAPSULE ORAL DAILY
Qty: 90 CAPSULE | Refills: 3 | Status: CANCELLED | OUTPATIENT
Start: 2025-02-13

## 2025-02-13 RX ORDER — METOPROLOL SUCCINATE 100 MG/1
100 TABLET, EXTENDED RELEASE ORAL DAILY
Qty: 90 TABLET | Refills: 3 | Status: SHIPPED | OUTPATIENT
Start: 2025-02-13

## 2025-02-13 RX ORDER — ATORVASTATIN CALCIUM 20 MG/1
20 TABLET, FILM COATED ORAL DAILY
Qty: 90 TABLET | Refills: 3 | Status: SHIPPED | OUTPATIENT
Start: 2025-02-13

## 2025-02-13 RX ORDER — HYDROCODONE BITARTRATE AND ACETAMINOPHEN 7.5; 325 MG/1; MG/1
1 TABLET ORAL EVERY 6 HOURS PRN
Qty: 28 TABLET | Refills: 0 | Status: SHIPPED | OUTPATIENT
Start: 2025-02-13 | End: 2025-02-20

## 2025-02-13 RX ORDER — MELOXICAM 15 MG/1
15 TABLET ORAL DAILY
Qty: 90 TABLET | Refills: 3 | Status: SHIPPED | OUTPATIENT
Start: 2025-02-13

## 2025-02-13 ASSESSMENT — ENCOUNTER SYMPTOMS
SORE THROAT: 0
WHEEZING: 0
NAUSEA: 0
VOMITING: 0
SHORTNESS OF BREATH: 0
ABDOMINAL PAIN: 0
COUGH: 0
CONSTIPATION: 0
DIARRHEA: 0

## 2025-02-13 NOTE — PATIENT INSTRUCTIONS
to be harmless, most will be treated. This means that you may get treatment--including surgery, radiation, or chemotherapy--that you don't need.  There is a risk of damage to cells or tissue from being exposed to radiation, including the small amounts used in CTs, X-rays, and other medical tests. Over time, exposure to radiation may cause cancer and other health problems. But in most cases, the risk of getting cancer from being exposed to small amounts of radiation is low. It's not a reason to avoid these tests for most people.  What are the benefits of screening?  Your scan may be normal (negative).  For some people who are at higher risk, screening lowers the chance of dying of lung cancer. How much and how long you smoked helps to determine your risk level. Screening can find some cancers early, when treatment may be more likely to work.  What happens after screening?  The results of your CT scan will be sent to your doctor. Someone from your care team will explain the results of your scan and answer any questions you may have. If you need any follow-up, he or she will help you understand what to do next.  After a lung cancer screening, you can go back to your usual activities right away.  A lung cancer screening test can't tell if you have lung cancer. If your results are positive, your doctor can't tell whether an abnormal finding is a harmless nodule, cancer, or something else without doing more tests.  What can you do to help prevent lung cancer?  Some lung cancers can't be prevented. But if you smoke, quitting smoking is the best step you can take to prevent lung cancer. If you want to quit, your doctor can recommend medicines or other ways to help.  Follow-up care is a key part of your treatment and safety. Be sure to make and go to all appointments, and call your doctor if you are having problems. It's also a good idea to know your test results and keep a list of the medicines you take.  Where can you

## 2025-02-13 NOTE — PROGRESS NOTES
PROGRESS NOTE    SUBJECTIVE:   Anam Thomson is a 64 y.o. male seen for a follow up visit regarding the following chief complaint:     Chief Complaint   Patient presents with    Medicare AWV     Medication refills and pharmacy confirmed.           HPI patient presents office today for Medicare wellness visit complaining of right hip pain history of hip problems been seen by an orthopedic and got a \"shot in his hip\" patient states he wants to have something when it acts up      Past Medical History, Past Surgical History, Family history, Social History, and Medications were all reviewed with the patient today and updated as necessary.       Current Outpatient Medications   Medication Sig Dispense Refill    apixaban (ELIQUIS) 5 MG TABS tablet Take 1 tablet by mouth 2 times daily 180 tablet 3    atorvastatin (LIPITOR) 20 MG tablet Take 1 tablet by mouth daily 90 tablet 3    metoprolol succinate (TOPROL XL) 100 MG extended release tablet Take 1 tablet by mouth daily 90 tablet 3    HYDROcodone-acetaminophen (NORCO) 7.5-325 MG per tablet Take 1 tablet by mouth every 6 hours as needed for Pain for up to 7 days. Intended supply: 7 days. Take lowest dose possible to manage pain Max Daily Amount: 4 tablets 28 tablet 0    meloxicam (MOBIC) 15 MG tablet Take 1 tablet by mouth daily 90 tablet 3    finasteride (PROSCAR) 5 MG tablet Take 1 tablet by mouth daily 90 tablet 3    losartan (COZAAR) 100 MG tablet Take 1 tablet by mouth daily Thinks he takes but not sure      olmesartan-hydroCHLOROthiazide (BENICAR HCT) 40-25 MG per tablet Take 1 tablet by mouth daily 30 tablet 11    potassium chloride (KLOR-CON M) 20 MEQ extended release tablet Take 1 tablet by mouth daily 30 tablet 11    amLODIPine (NORVASC) 10 MG tablet Take 1 tablet by mouth daily 30 tablet 3     No current facility-administered medications for this visit.     No Known Allergies  Patient Active Problem List   Diagnosis    Tobacco abuse    Atrial

## 2025-02-24 ENCOUNTER — TELEMEDICINE (OUTPATIENT)
Dept: FAMILY MEDICINE CLINIC | Facility: CLINIC | Age: 65
End: 2025-02-24
Payer: MEDICARE

## 2025-02-24 DIAGNOSIS — E78.01 FAMILIAL HYPERCHOLESTEROLEMIA: ICD-10-CM

## 2025-02-24 DIAGNOSIS — I10 PRIMARY HYPERTENSION: ICD-10-CM

## 2025-02-24 DIAGNOSIS — N18.30 STAGE 3 CHRONIC KIDNEY DISEASE, UNSPECIFIED WHETHER STAGE 3A OR 3B CKD (HCC): Primary | ICD-10-CM

## 2025-02-24 PROCEDURE — 1159F MED LIST DOCD IN RCRD: CPT | Performed by: FAMILY MEDICINE

## 2025-02-24 PROCEDURE — 99213 OFFICE O/P EST LOW 20 MIN: CPT | Performed by: FAMILY MEDICINE

## 2025-02-24 NOTE — PROGRESS NOTES
PROGRESS NOTE    SUBJECTIVE:   Anam Thomson is a 64 y.o. male seen for a follow up visit regarding the following chief complaint:     Chief Complaint   Patient presents with    Follow-up     labs           HPI patient is doing a phone call visit to go over his lab results without any new complaintsRobkumar Thomson is a 64 y.o. male evaluated via telephone on 2/24/2025 for Follow-up (labs)  .        Total Time: minutes: 11-20 minutes    Anam Thomson was evaluated through a synchronous (real-time) audio encounter. Patient identification was verified at the start of the visit. He (or guardian if applicable) is aware that this is a billable service, which includes applicable co-pays. This visit was conducted with the patient's (and/or legal guardian's) verbal consent. He has not had a related appointment within my department in the past 7 days or scheduled within the next 24 hours.   The patient was located at Home: [address] [place] [address].  The provider was located at Facility (Appt Dept): [address] [address]  [address] [id number].    Note: not billable if this call serves to triage the patient into an appointment for the relevant concern         Past Medical History, Past Surgical History, Family history, Social History, and Medications were all reviewed with the patient today and updated as necessary.       Current Outpatient Medications   Medication Sig Dispense Refill    apixaban (ELIQUIS) 5 MG TABS tablet Take 1 tablet by mouth 2 times daily 180 tablet 3    atorvastatin (LIPITOR) 20 MG tablet Take 1 tablet by mouth daily 90 tablet 3    metoprolol succinate (TOPROL XL) 100 MG extended release tablet Take 1 tablet by mouth daily 90 tablet 3    meloxicam (MOBIC) 15 MG tablet Take 1 tablet by mouth daily 90 tablet 3    finasteride (PROSCAR) 5 MG tablet Take 1 tablet by mouth daily 90 tablet 3    losartan (COZAAR) 100 MG tablet Take 1 tablet by mouth daily Thinks he

## 2025-03-09 DIAGNOSIS — E78.01 FAMILIAL HYPERCHOLESTEROLEMIA: ICD-10-CM

## 2025-03-10 RX ORDER — ATORVASTATIN CALCIUM 20 MG/1
20 TABLET, FILM COATED ORAL DAILY
Qty: 90 TABLET | Refills: 3 | OUTPATIENT
Start: 2025-03-10

## 2025-06-16 ENCOUNTER — APPOINTMENT (OUTPATIENT)
Dept: CT IMAGING | Age: 65
End: 2025-06-16
Payer: MEDICARE

## 2025-06-16 ENCOUNTER — HOSPITAL ENCOUNTER (OUTPATIENT)
Age: 65
Setting detail: OBSERVATION
Discharge: HOME | End: 2025-06-18
Attending: STUDENT IN AN ORGANIZED HEALTH CARE EDUCATION/TRAINING PROGRAM
Payer: MEDICARE

## 2025-06-16 DIAGNOSIS — R42 LIGHTHEADEDNESS: Primary | ICD-10-CM

## 2025-06-16 DIAGNOSIS — N17.9 AKI (ACUTE KIDNEY INJURY): ICD-10-CM

## 2025-06-16 LAB
ALBUMIN SERPL-MCNC: 3.9 G/DL (ref 3.2–4.6)
ALBUMIN/GLOB SERPL: 1 (ref 1–1.9)
ALP SERPL-CCNC: 150 U/L (ref 40–129)
ALT SERPL-CCNC: 23 U/L (ref 8–55)
ANION GAP SERPL CALC-SCNC: 14 MMOL/L (ref 7–16)
APPEARANCE UR: CLEAR
AST SERPL-CCNC: 25 U/L (ref 15–37)
BACTERIA URNS QL MICRO: ABNORMAL /HPF
BASOPHILS # BLD: 0.06 K/UL (ref 0–0.2)
BASOPHILS NFR BLD: 0.5 % (ref 0–2)
BILIRUB SERPL-MCNC: 0.5 MG/DL (ref 0–1.2)
BILIRUB UR QL: NEGATIVE
BUN SERPL-MCNC: 43 MG/DL (ref 8–23)
CALCIUM SERPL-MCNC: 9.9 MG/DL (ref 8.8–10.2)
CASTS URNS QL MICRO: ABNORMAL /LPF
CHLORIDE SERPL-SCNC: 100 MMOL/L (ref 98–107)
CO2 SERPL-SCNC: 26 MMOL/L (ref 20–29)
COLOR UR: ABNORMAL
CREAT SERPL-MCNC: 4.11 MG/DL (ref 0.8–1.3)
CRYSTALS URNS QL MICRO: 0 /LPF
DIFFERENTIAL METHOD BLD: ABNORMAL
EOSINOPHIL # BLD: 0.37 K/UL (ref 0–0.8)
EOSINOPHIL NFR BLD: 2.8 % (ref 0.5–7.8)
EPI CELLS #/AREA URNS HPF: ABNORMAL /HPF
ERYTHROCYTE [DISTWIDTH] IN BLOOD BY AUTOMATED COUNT: 13.4 % (ref 11.9–14.6)
GLOBULIN SER CALC-MCNC: 3.7 G/DL (ref 2.3–3.5)
GLUCOSE SERPL-MCNC: 125 MG/DL (ref 70–99)
GLUCOSE UR STRIP.AUTO-MCNC: NEGATIVE MG/DL
HCT VFR BLD AUTO: 48.3 % (ref 41.1–50.3)
HGB BLD-MCNC: 15.8 G/DL (ref 13.6–17.2)
HGB UR QL STRIP: NEGATIVE
IMM GRANULOCYTES # BLD AUTO: 0.04 K/UL (ref 0–0.5)
IMM GRANULOCYTES NFR BLD AUTO: 0.3 % (ref 0–5)
KETONES UR QL STRIP.AUTO: ABNORMAL MG/DL
LEUKOCYTE ESTERASE UR QL STRIP.AUTO: NEGATIVE
LYMPHOCYTES # BLD: 3.72 K/UL (ref 0.5–4.6)
LYMPHOCYTES NFR BLD: 28.4 % (ref 13–44)
MAGNESIUM SERPL-MCNC: 1.9 MG/DL (ref 1.8–2.4)
MCH RBC QN AUTO: 28.2 PG (ref 26.1–32.9)
MCHC RBC AUTO-ENTMCNC: 32.7 G/DL (ref 31.4–35)
MCV RBC AUTO: 86.1 FL (ref 82–102)
MONOCYTES # BLD: 1.15 K/UL (ref 0.1–1.3)
MONOCYTES NFR BLD: 8.8 % (ref 4–12)
MUCOUS THREADS URNS QL MICRO: ABNORMAL /LPF
NEUTS SEG # BLD: 7.77 K/UL (ref 1.7–8.2)
NEUTS SEG NFR BLD: 59.2 % (ref 43–78)
NITRITE UR QL STRIP.AUTO: NEGATIVE
NRBC # BLD: 0 K/UL (ref 0–0.2)
OTHER OBSERVATIONS: ABNORMAL
PH UR STRIP: 5.5 (ref 5–9)
PLATELET # BLD AUTO: 444 K/UL (ref 150–450)
PMV BLD AUTO: 9.1 FL (ref 9.4–12.3)
POTASSIUM SERPL-SCNC: 4.1 MMOL/L (ref 3.5–5.1)
PROT SERPL-MCNC: 7.6 G/DL (ref 6.3–8.2)
PROT UR STRIP-MCNC: 30 MG/DL
RBC # BLD AUTO: 5.61 M/UL (ref 4.23–5.6)
RBC #/AREA URNS HPF: ABNORMAL /HPF
SODIUM SERPL-SCNC: 139 MMOL/L (ref 136–145)
SP GR UR REFRACTOMETRY: 1.02 (ref 1–1.02)
TROPONIN T SERPL HS-MCNC: 29.6 NG/L (ref 0–22)
TROPONIN T SERPL HS-MCNC: 37.7 NG/L (ref 0–22)
URINE CULTURE IF INDICATED: ABNORMAL
UROBILINOGEN UR QL STRIP.AUTO: 1 EU/DL (ref 0.2–1)
WBC # BLD AUTO: 13.1 K/UL (ref 4.3–11.1)
WBC URNS QL MICRO: ABNORMAL /HPF

## 2025-06-16 PROCEDURE — 83735 ASSAY OF MAGNESIUM: CPT

## 2025-06-16 PROCEDURE — 81001 URINALYSIS AUTO W/SCOPE: CPT

## 2025-06-16 PROCEDURE — G0378 HOSPITAL OBSERVATION PER HR: HCPCS

## 2025-06-16 PROCEDURE — 80053 COMPREHEN METABOLIC PANEL: CPT

## 2025-06-16 PROCEDURE — 2580000003 HC RX 258: Performed by: STUDENT IN AN ORGANIZED HEALTH CARE EDUCATION/TRAINING PROGRAM

## 2025-06-16 PROCEDURE — 85025 COMPLETE CBC W/AUTO DIFF WBC: CPT

## 2025-06-16 PROCEDURE — 6360000002 HC RX W HCPCS

## 2025-06-16 PROCEDURE — 74176 CT ABD & PELVIS W/O CONTRAST: CPT

## 2025-06-16 PROCEDURE — 84484 ASSAY OF TROPONIN QUANT: CPT

## 2025-06-16 PROCEDURE — 51798 US URINE CAPACITY MEASURE: CPT

## 2025-06-16 PROCEDURE — 2500000003 HC RX 250 WO HCPCS

## 2025-06-16 PROCEDURE — 93005 ELECTROCARDIOGRAM TRACING: CPT | Performed by: STUDENT IN AN ORGANIZED HEALTH CARE EDUCATION/TRAINING PROGRAM

## 2025-06-16 PROCEDURE — 99285 EMERGENCY DEPT VISIT HI MDM: CPT

## 2025-06-16 PROCEDURE — 96360 HYDRATION IV INFUSION INIT: CPT

## 2025-06-16 RX ORDER — LOSARTAN POTASSIUM 50 MG/1
100 TABLET ORAL DAILY
Status: DISCONTINUED | OUTPATIENT
Start: 2025-06-16 | End: 2025-06-18 | Stop reason: HOSPADM

## 2025-06-16 RX ORDER — ENOXAPARIN SODIUM 100 MG/ML
30 INJECTION SUBCUTANEOUS DAILY
Status: DISCONTINUED | OUTPATIENT
Start: 2025-06-16 | End: 2025-06-16

## 2025-06-16 RX ORDER — SODIUM CHLORIDE 9 MG/ML
INJECTION, SOLUTION INTRAVENOUS PRN
Status: DISCONTINUED | OUTPATIENT
Start: 2025-06-16 | End: 2025-06-18 | Stop reason: HOSPADM

## 2025-06-16 RX ORDER — SODIUM CHLORIDE 0.9 % (FLUSH) 0.9 %
5-40 SYRINGE (ML) INJECTION PRN
Status: DISCONTINUED | OUTPATIENT
Start: 2025-06-16 | End: 2025-06-18 | Stop reason: HOSPADM

## 2025-06-16 RX ORDER — ONDANSETRON 4 MG/1
4 TABLET, ORALLY DISINTEGRATING ORAL EVERY 8 HOURS PRN
Status: DISCONTINUED | OUTPATIENT
Start: 2025-06-16 | End: 2025-06-18 | Stop reason: HOSPADM

## 2025-06-16 RX ORDER — ONDANSETRON 2 MG/ML
4 INJECTION INTRAMUSCULAR; INTRAVENOUS EVERY 6 HOURS PRN
Status: DISCONTINUED | OUTPATIENT
Start: 2025-06-16 | End: 2025-06-18 | Stop reason: HOSPADM

## 2025-06-16 RX ORDER — HEPARIN SODIUM 5000 [USP'U]/ML
5000 INJECTION, SOLUTION INTRAVENOUS; SUBCUTANEOUS EVERY 8 HOURS SCHEDULED
Status: DISCONTINUED | OUTPATIENT
Start: 2025-06-16 | End: 2025-06-16 | Stop reason: SDUPTHER

## 2025-06-16 RX ORDER — FINASTERIDE 5 MG/1
5 TABLET, FILM COATED ORAL DAILY
Status: DISCONTINUED | OUTPATIENT
Start: 2025-06-17 | End: 2025-06-18 | Stop reason: HOSPADM

## 2025-06-16 RX ORDER — AMLODIPINE BESYLATE 10 MG/1
10 TABLET ORAL DAILY
Status: DISCONTINUED | OUTPATIENT
Start: 2025-06-17 | End: 2025-06-18 | Stop reason: HOSPADM

## 2025-06-16 RX ORDER — METOPROLOL SUCCINATE 100 MG/1
100 TABLET, EXTENDED RELEASE ORAL DAILY
Status: DISCONTINUED | OUTPATIENT
Start: 2025-06-17 | End: 2025-06-18 | Stop reason: HOSPADM

## 2025-06-16 RX ORDER — ACETAMINOPHEN 650 MG/1
650 SUPPOSITORY RECTAL EVERY 6 HOURS PRN
Status: DISCONTINUED | OUTPATIENT
Start: 2025-06-16 | End: 2025-06-18 | Stop reason: HOSPADM

## 2025-06-16 RX ORDER — ATORVASTATIN CALCIUM 20 MG/1
20 TABLET, FILM COATED ORAL DAILY
Status: DISCONTINUED | OUTPATIENT
Start: 2025-06-16 | End: 2025-06-18 | Stop reason: HOSPADM

## 2025-06-16 RX ORDER — ACETAMINOPHEN 325 MG/1
650 TABLET ORAL EVERY 6 HOURS PRN
Status: DISCONTINUED | OUTPATIENT
Start: 2025-06-16 | End: 2025-06-18 | Stop reason: HOSPADM

## 2025-06-16 RX ORDER — HYDROCHLOROTHIAZIDE 25 MG/1
25 TABLET ORAL DAILY
Status: DISCONTINUED | OUTPATIENT
Start: 2025-06-16 | End: 2025-06-18 | Stop reason: HOSPADM

## 2025-06-16 RX ORDER — OLMESARTAN MEDOXOMIL AND HYDROCHLOROTHIAZIDE 40/25 40; 25 MG/1; MG/1
1 TABLET ORAL DAILY
Status: DISCONTINUED | OUTPATIENT
Start: 2025-06-16 | End: 2025-06-16 | Stop reason: CLARIF

## 2025-06-16 RX ORDER — POLYETHYLENE GLYCOL 3350 17 G/17G
17 POWDER, FOR SOLUTION ORAL DAILY PRN
Status: DISCONTINUED | OUTPATIENT
Start: 2025-06-16 | End: 2025-06-18 | Stop reason: HOSPADM

## 2025-06-16 RX ORDER — SODIUM CHLORIDE, SODIUM LACTATE, POTASSIUM CHLORIDE, AND CALCIUM CHLORIDE .6; .31; .03; .02 G/100ML; G/100ML; G/100ML; G/100ML
1000 INJECTION, SOLUTION INTRAVENOUS
Status: COMPLETED | OUTPATIENT
Start: 2025-06-16 | End: 2025-06-16

## 2025-06-16 RX ORDER — SODIUM CHLORIDE 0.9 % (FLUSH) 0.9 %
5-40 SYRINGE (ML) INJECTION EVERY 12 HOURS SCHEDULED
Status: DISCONTINUED | OUTPATIENT
Start: 2025-06-16 | End: 2025-06-18 | Stop reason: HOSPADM

## 2025-06-16 RX ADMIN — SODIUM CHLORIDE, PRESERVATIVE FREE 10 ML: 5 INJECTION INTRAVENOUS at 20:43

## 2025-06-16 RX ADMIN — SODIUM CHLORIDE, SODIUM LACTATE, POTASSIUM CHLORIDE, AND CALCIUM CHLORIDE 1000 ML: .6; .31; .03; .02 INJECTION, SOLUTION INTRAVENOUS at 13:32

## 2025-06-16 ASSESSMENT — PAIN - FUNCTIONAL ASSESSMENT: PAIN_FUNCTIONAL_ASSESSMENT: NONE - DENIES PAIN

## 2025-06-16 ASSESSMENT — LIFESTYLE VARIABLES
HOW OFTEN DO YOU HAVE A DRINK CONTAINING ALCOHOL: 2-3 TIMES A WEEK
HOW MANY STANDARD DRINKS CONTAINING ALCOHOL DO YOU HAVE ON A TYPICAL DAY: 1 OR 2

## 2025-06-16 NOTE — FLOWSHEET NOTE
06/16/25 1813   Dual Clinician Skin Assessment   Dual Skin Assessment (4 Eyes) WDL   Second Clinical  (First and Last Name) ARTURO Porras   Skin Integumentary    Skin Color Dusky   Skin Condition/Temp Dry;Flaky;Fragile   Skin Integrity Scars (comment);Vascular discoloration or hemochromatosis/staining   Location BLE scars and discolorations   Skin Integumentary (WDL) X       4 Eyes Skin Assessment     NAME:  Anam Thomson  YOB: 1960  MEDICAL RECORD NUMBER:  [id number]    The patient is being assessed for  Admission    I agree that at least one RN has performed a thorough Head to Toe Skin Assessment on the patient. ALL assessment sites listed below have been assessed.      Areas assessed by both nurses:    Head, Face, Ears, Shoulders, Back, Chest, Arms, Elbows, Hands, Sacrum. Buttock, Coccyx, Ischium, Legs. Feet and Heels, and Under Medical Devices         Does the Patient have a Wound? No noted wound(s)       Aaron Prevention initiated by RN: Yes  Wound Care Orders initiated by RN: No    Pressure Injury (Stage 3,4, Unstageable, DTI, NWPT, and Complex wounds) if present, place Wound referral order by RN under : No    New Ostomies, if present place, Ostomy referral order under : No     Nurse 1 eSignature: Electronically signed by Shawnee Painting RN on 6/16/25 at 6:24 PM EDT    **SHARE this note so that the co-signing nurse can place an eSignature**    Nurse 2 eSignature: Electronically signed by Chiquita Henderson RN on 6/16/25 at 6:42 PM EDT

## 2025-06-16 NOTE — ED TRIAGE NOTES
Pt reports near syncopal episode while working. Pt was climbing over pallet  when he felt lightheaded.  Pt now feels weak but no longer feels lightheaded.

## 2025-06-16 NOTE — H&P
Hospitalist History and Physical   Admit Date:  2025  1:21 PM   Name:  Anam Thomson   Age:  64 y.o.  Sex:  male  :  1960   MRN:  [id number]   Room:  [place] [address]    Presenting/Chief Complaint: Near-syncopal Episode     Reason(s) for Admission: JAYDON (acute kidney injury) [N17.9]     History of Present Illness:   Anam Thomson is a 64 y.o. male with medical history of hypertension, hyperlipidemia, atrial fibrillation on Eliquis, persistent hypokalemia presenting to the hospital complaining of presyncope.  Patient's symptoms occurred while working outside in hot weather.  Endorses attempting to achieve adequate oral hydration.  Endorses history of BPH requiring finasteride but does not feel like he is suffering from any urinary retention symptoms.  Denies any fevers or chills.  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  Endorsing right hip pain as well.  Denies any chest pain, shortness of breath during his presyncopal event. Denies any dysuria or changes in urinary habits.  Hospital medicine consulted for admission given his JAYDON basic labs.      Assessment & Plan:     JAYDON on CKD  Presyncope  Prerenal in the setting of outside exertion/dehydration.  Status post 1 L IV fluid  - Oral hydration order set  - Check creatinine kinase  - CT abdomen pelvis ordered in ER, follow-up.  - Hold HCTZ/olmesartan.  Can resume in approximately 48 hours    Hypertension  Holding olmesartan/HCTZ.  Continue amlodipine    Atrial fibrillation  Denies any symptoms of heart palpitation dizziness.  Endorses being able to tell when he is in atrial fibrillation  - Continue home metoprolol and Eliquis          Non-peripheral Lines and Tubes (if present):             Hospital Problems:  Principal Problem:    JAYDON (acute kidney injury)  Active Problems:    Hypokalemia    Tobacco abuse    Atrial fibrillation (HCC)    Obesity, unspecified    CKD (chronic kidney disease)

## 2025-06-16 NOTE — ED PROVIDER NOTES
Emergency Department Provider Note       [facility] EMERGENCY DEPT   PCP: Harpreet Chiang DO   Age: 64 y.o.   Sex: male     DISPOSITION Decision To Admit 06/16/2025 03:56:38 PM    ICD-10-CM    1. Lightheadedness  R42       2. JAYDON (acute kidney injury)  N17.9           Medical Decision Making     64-year-old male presents the emergency department complaint of an episode of lightheadedness that occurred when he was climbing over some pallets at work.  States he had been seated prior to doing this.  States as he was climbing over the balance after he had just stood up, he began to feel lightheaded.  Did not pass out but felt he was going to.  Was able to stop and rest and the symptoms resolved.  Denies chest pain, back pain or shortness of breath.  Denies vomiting or diarrhea.  Is compliant all of his medications which include blood pressure medication as well as Eliquis.  He does report staying hydrated with clear liquids.  Does work outside in the heat.  Reports he feels okay at this time but wanted to get checked.  On arrival blood pressure 112/82.  He is in no distress.  Basic blood work obtained along with EKG.  Will give 1 L IV fluids given his history of lightheadedness in the setting of working outside in the heat.  Lab work shows white count of 13, stable H&H.  CMP shows BUN of 43, creatinine is 4.11.  Significantly increased from his baseline which was 1.86 in February.  GFR is 15 today.  Magnesium normal.  Initial troponin is 37, repeat 29.  Blood pressures remained stable in the ER.  Urinalysis is ordered and pending, CT scan without contrast is pending at this time as well.  Hospitalist consulted for admission.  Patient voiced understanding and agreement.     Complexity of Problem: Acute complaint requiring workup rule out emergent etiology  Shared medical decision making was utilized in creating the patients health plan today.  I independently ordered and reviewed each unique test.    I reviewed external

## 2025-06-16 NOTE — DISCHARGE INSTRUCTIONS
Stay orally hydrated with clear liquids.  Take your blood pressure every morning and do not take your amlodipine if your systolic (top number) BP is 130 or lower.  Continue to monitor your vital signs, follow-up with your family physician in 1 to 2 weeks for recheck.  Return to the ER for any worsening or worrisome symptoms.

## 2025-06-16 NOTE — ED NOTES
TRANSFER - OUT REPORT:    Verbal report given to ARTURO Mallory on Anam Thomson  being transferred to [facility] for routine progression of patient care       Report consisted of patient's Situation, Background, Assessment and   Recommendations(SBAR).     Information from the following report(s) ED Encounter Summary was reviewed with the receiving nurse.    Center Line Fall Assessment:    Presents to emergency department  because of falls (Syncope, seizure, or loss of consciousness): No  Age > 70: No  Altered Mental Status, Intoxication with alcohol or substance confusion (Disorientation, impaired judgment, poor safety awaremess, or inability to follow instructions): No  Impaired Mobility: Ambulates or transfers with assistive devices or assistance; Unable to ambulate or transer.: No  Nursing Judgement: No          Lines:   Peripheral IV 06/16/25 Right Antecubital (Active)        Opportunity for questions and clarification was provided.      Patient transported with:  Henrique Carmona RN  06/16/25 [id number]

## 2025-06-17 LAB
ANION GAP SERPL CALC-SCNC: 14 MMOL/L (ref 7–16)
BUN SERPL-MCNC: 50 MG/DL (ref 8–23)
CALCIUM SERPL-MCNC: 8.9 MG/DL (ref 8.8–10.2)
CHLORIDE SERPL-SCNC: 100 MMOL/L (ref 98–107)
CK SERPL-CCNC: 92 U/L (ref 21–215)
CO2 SERPL-SCNC: 22 MMOL/L (ref 20–29)
CREAT SERPL-MCNC: 3.17 MG/DL (ref 0.8–1.3)
EKG ATRIAL RATE: 72 BPM
EKG DIAGNOSIS: NORMAL
EKG P AXIS: -21 DEGREES
EKG P-R INTERVAL: 156 MS
EKG Q-T INTERVAL: 386 MS
EKG QRS DURATION: 86 MS
EKG QTC CALCULATION (BAZETT): 422 MS
EKG R AXIS: 31 DEGREES
EKG T AXIS: 97 DEGREES
EKG VENTRICULAR RATE: 72 BPM
GLUCOSE SERPL-MCNC: 110 MG/DL (ref 70–99)
POTASSIUM SERPL-SCNC: 3.6 MMOL/L (ref 3.5–5.1)
SODIUM SERPL-SCNC: 137 MMOL/L (ref 136–145)

## 2025-06-17 PROCEDURE — 36415 COLL VENOUS BLD VENIPUNCTURE: CPT

## 2025-06-17 PROCEDURE — G0378 HOSPITAL OBSERVATION PER HR: HCPCS

## 2025-06-17 PROCEDURE — 2500000003 HC RX 250 WO HCPCS

## 2025-06-17 PROCEDURE — 82550 ASSAY OF CK (CPK): CPT

## 2025-06-17 PROCEDURE — 2580000003 HC RX 258: Performed by: NURSE PRACTITIONER

## 2025-06-17 PROCEDURE — 96361 HYDRATE IV INFUSION ADD-ON: CPT

## 2025-06-17 PROCEDURE — 93010 ELECTROCARDIOGRAM REPORT: CPT | Performed by: INTERNAL MEDICINE

## 2025-06-17 PROCEDURE — 80048 BASIC METABOLIC PNL TOTAL CA: CPT

## 2025-06-17 PROCEDURE — 6370000000 HC RX 637 (ALT 250 FOR IP)

## 2025-06-17 RX ORDER — SODIUM CHLORIDE 9 MG/ML
INJECTION, SOLUTION INTRAVENOUS CONTINUOUS
Status: DISCONTINUED | OUTPATIENT
Start: 2025-06-17 | End: 2025-06-18

## 2025-06-17 RX ADMIN — FINASTERIDE 5 MG: 5 TABLET, FILM COATED ORAL at 08:51

## 2025-06-17 RX ADMIN — APIXABAN 5 MG: 5 TABLET, FILM COATED ORAL at 20:31

## 2025-06-17 RX ADMIN — METOPROLOL SUCCINATE 100 MG: 100 TABLET, EXTENDED RELEASE ORAL at 08:51

## 2025-06-17 RX ADMIN — SODIUM CHLORIDE, PRESERVATIVE FREE 10 ML: 5 INJECTION INTRAVENOUS at 08:51

## 2025-06-17 RX ADMIN — SODIUM CHLORIDE, PRESERVATIVE FREE 10 ML: 5 INJECTION INTRAVENOUS at 20:31

## 2025-06-17 RX ADMIN — APIXABAN 5 MG: 5 TABLET, FILM COATED ORAL at 08:51

## 2025-06-17 RX ADMIN — SODIUM CHLORIDE: 0.9 INJECTION, SOLUTION INTRAVENOUS at 15:55

## 2025-06-17 RX ADMIN — AMLODIPINE BESYLATE 10 MG: 10 TABLET ORAL at 08:51

## 2025-06-17 NOTE — PROGRESS NOTES
Hospitalist Progress Note   Admit Date:  2025  1:21 PM   Name:  Anam Thomson   Age:  64 y.o.  Sex:  male  :  1960   MRN:  [id number]   Room:  [facility]    Presenting/Chief Complaint: Near-syncopal Episode     Reason(s) for Admission: Lightheadedness [R42]  JAYDON (acute kidney injury) [N17.9]     Hospital Course:   Anam Thomson is a 64 y.o. male with medical history of hypertension, hyperlipidemia, atrial fibrillation on Eliquis, persistent hypokalemia presenting to the hospital complaining of presyncope.  Patient's symptoms occurred while working outside in hot weather.  Endorses attempting to achieve adequate oral hydration.  Endorses history of BPH requiring finasteride but does not feel like he is suffering from any urinary retention symptoms.  Denies any fevers or chills.  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  Denies any chest pain, palpitations and SOB  during presyncopal event. Denies any dysuria or changes in urinary habits.  Hospital medicine consulted for admission given his JAYDON basic labs.     Subjective & 24hr Events:   Patient alert, oriented and conversational. No distress present.  Cr downtrending, 3.17 today. Continue to trend. Patient admits to \"probably not drinking enough water.\" Encouraged fluid intake.   Denies HA, blurry vision, dizziness, lightheadedness, chest pain, palpitations and SOB. Check orthostatic VS.     Assessment & Plan:       JAYDON (acute kidney injury)    CKD (chronic kidney disease) stage 3, GFR 30-59 ml/min ([facility])  - suspect this is secondary to decreased fluid intake  - admission Cr 4.11, down to 3.17  - s/p 1L IVF in ED  - CT A/P prostatomegaly and circumferential thickening of urinary bladder which may be 2/2 cystitis.   - UA grossly unremarkable   - denies urinary symptoms   - hold HCTZ and olmesartan  - trend renal fxn   - encouraged fluid intake

## 2025-06-17 NOTE — CARE COORDINATION
Pt chart reviewed for discharge planning. MSW met with pt at bedside, verified demographic information/ health insurance. Pt lives alone in one level home, states independent with ADLs, uses no DME, and drives . PCP was confirmed. Pt reports no outside services in the home. MSW will follow pt plan of care and assist with supportive care referrals pending pt clinical progress.  Please consult case management if specific needs arise.        06/17/25 1039   Service Assessment   Patient Orientation Alert and Oriented   Cognition Alert   History Provided By Patient   Primary Caregiver Self   Support Systems Children;Family Members   Patient's Healthcare Decision Maker is: Legal Next of Kin   PCP Verified by CM Yes   Last Visit to PCP Within last 6 months   Prior Functional Level Independent in ADLs/IADLs   Current Functional Level Independent in ADLs/IADLs   Can patient return to prior living arrangement Yes   Ability to make needs known: Good   Family able to assist with home care needs: Yes   Would you like for me to discuss the discharge plan with any other family members/significant others, and if so, who? No   Financial Resources Medicare;Other (Comment)  ([employer] Senior Medicare Supp)   Community Resources None   Social/Functional History   Lives With Alone   Type of Home House   Home Layout One level   Home Equipment None   Receives Help From Family   Prior Level of Assist for ADLs Independent   Ambulation Assistance Independent   Active  Yes   Discharge Planning   Type of Residence House   Living Arrangements Alone   Current Services Prior To Admission None   Potential Assistance Needed N/A   DME Ordered? No   Potential Assistance Purchasing Medications No   Type of Home Care Services None   Patient expects to be discharged to: House   One/Two Story Residence One story   Services At/After Discharge   Transition of Care Consult (CM Consult) Discharge Planning   Condition of Participation: Discharge Planning

## 2025-06-17 NOTE — PROGRESS NOTES
Pt is in bed without complaints. Hourly rounds completed and all needs met. Fluids started this evening. Bed is low, locked, and call light is in reach. Pt encouraged to call for assistance.

## 2025-06-18 VITALS
HEIGHT: 72 IN | DIASTOLIC BLOOD PRESSURE: 80 MMHG | HEART RATE: 73 BPM | RESPIRATION RATE: 16 BRPM | SYSTOLIC BLOOD PRESSURE: 117 MMHG | OXYGEN SATURATION: 100 % | TEMPERATURE: 97.7 F | BODY MASS INDEX: 33.86 KG/M2 | WEIGHT: 250 LBS

## 2025-06-18 LAB
ANION GAP SERPL CALC-SCNC: 12 MMOL/L (ref 7–16)
BASOPHILS # BLD: 0.05 K/UL (ref 0–0.2)
BASOPHILS NFR BLD: 0.5 % (ref 0–2)
BUN SERPL-MCNC: 37 MG/DL (ref 8–23)
CALCIUM SERPL-MCNC: 8.9 MG/DL (ref 8.8–10.2)
CHLORIDE SERPL-SCNC: 102 MMOL/L (ref 98–107)
CO2 SERPL-SCNC: 24 MMOL/L (ref 20–29)
CREAT SERPL-MCNC: 1.99 MG/DL (ref 0.8–1.3)
DIFFERENTIAL METHOD BLD: ABNORMAL
EOSINOPHIL # BLD: 0.51 K/UL (ref 0–0.8)
EOSINOPHIL NFR BLD: 4.8 % (ref 0.5–7.8)
ERYTHROCYTE [DISTWIDTH] IN BLOOD BY AUTOMATED COUNT: 13.1 % (ref 11.9–14.6)
GLUCOSE SERPL-MCNC: 114 MG/DL (ref 70–99)
HCT VFR BLD AUTO: 45.5 % (ref 41.1–50.3)
HGB BLD-MCNC: 15.3 G/DL (ref 13.6–17.2)
IMM GRANULOCYTES # BLD AUTO: 0.04 K/UL (ref 0–0.5)
IMM GRANULOCYTES NFR BLD AUTO: 0.4 % (ref 0–5)
LYMPHOCYTES # BLD: 3.64 K/UL (ref 0.5–4.6)
LYMPHOCYTES NFR BLD: 34.4 % (ref 13–44)
MCH RBC QN AUTO: 28.3 PG (ref 26.1–32.9)
MCHC RBC AUTO-ENTMCNC: 33.6 G/DL (ref 31.4–35)
MCV RBC AUTO: 84.1 FL (ref 82–102)
MONOCYTES # BLD: 0.87 K/UL (ref 0.1–1.3)
MONOCYTES NFR BLD: 8.2 % (ref 4–12)
NEUTS SEG # BLD: 5.47 K/UL (ref 1.7–8.2)
NEUTS SEG NFR BLD: 51.7 % (ref 43–78)
NRBC # BLD: 0 K/UL (ref 0–0.2)
PLATELET # BLD AUTO: 379 K/UL (ref 150–450)
PMV BLD AUTO: 9.3 FL (ref 9.4–12.3)
POTASSIUM SERPL-SCNC: 3.9 MMOL/L (ref 3.5–5.1)
RBC # BLD AUTO: 5.41 M/UL (ref 4.23–5.6)
SODIUM SERPL-SCNC: 138 MMOL/L (ref 136–145)
WBC # BLD AUTO: 10.6 K/UL (ref 4.3–11.1)

## 2025-06-18 PROCEDURE — 80048 BASIC METABOLIC PNL TOTAL CA: CPT

## 2025-06-18 PROCEDURE — 36415 COLL VENOUS BLD VENIPUNCTURE: CPT

## 2025-06-18 PROCEDURE — 96361 HYDRATE IV INFUSION ADD-ON: CPT

## 2025-06-18 PROCEDURE — 85025 COMPLETE CBC W/AUTO DIFF WBC: CPT

## 2025-06-18 PROCEDURE — 2580000003 HC RX 258: Performed by: NURSE PRACTITIONER

## 2025-06-18 PROCEDURE — G0378 HOSPITAL OBSERVATION PER HR: HCPCS

## 2025-06-18 PROCEDURE — 6370000000 HC RX 637 (ALT 250 FOR IP)

## 2025-06-18 RX ADMIN — AMLODIPINE BESYLATE 10 MG: 10 TABLET ORAL at 08:07

## 2025-06-18 RX ADMIN — FINASTERIDE 5 MG: 5 TABLET, FILM COATED ORAL at 08:07

## 2025-06-18 RX ADMIN — SODIUM CHLORIDE: 0.9 INJECTION, SOLUTION INTRAVENOUS at 00:16

## 2025-06-18 RX ADMIN — APIXABAN 5 MG: 5 TABLET, FILM COATED ORAL at 08:07

## 2025-06-18 RX ADMIN — METOPROLOL SUCCINATE 100 MG: 100 TABLET, EXTENDED RELEASE ORAL at 08:07

## 2025-06-18 NOTE — DISCHARGE SUMMARY
08:49 PM    GLUCOSEU Negative 11/30/2023 03:38 PM    KETUA TRACE 06/16/2025 08:49 PM    BILIRUBINUR Negative 06/16/2025 08:49 PM    BLOODU Negative 06/16/2025 08:49 PM    UROBILINOGEN 1.0 06/16/2025 08:49 PM    NITRU Negative 06/16/2025 08:49 PM    LEUKOCYTESUR Negative 06/16/2025 08:49 PM    WBCUA 0-3 06/16/2025 08:49 PM    RBCUA 0-3 06/16/2025 08:49 PM    BACTERIA TRACE 06/16/2025 08:49 PM    LABCAST HYALINE 06/16/2025 08:49 PM    MUCUS 1+ 06/16/2025 08:49 PM        Microbiology:  Results       ** No results found for the last 336 hours. **            All Labs from Last 24 Hrs:  Recent Results (from the past 24 hours)   Basic Metabolic Panel    Collection Time: 06/18/25  3:59 AM   Result Value Ref Range    Sodium 138 136 - 145 mmol/L    Potassium 3.9 3.5 - 5.1 mmol/L    Chloride 102 98 - 107 mmol/L    CO2 24 20 - 29 mmol/L    Anion Gap 12 7 - 16 mmol/L    Glucose 114 (H) 70 - 99 mg/dL    BUN 37 (H) 8 - 23 MG/DL    Creatinine 1.99 (H) 0.80 - 1.30 MG/DL    Est, Glom Filt Rate 36 (L) >60 ml/min/1.73m2    Calcium 8.9 8.8 - 10.2 MG/DL   CBC with Auto Differential    Collection Time: 06/18/25  3:59 AM   Result Value Ref Range    WBC 10.6 4.3 - 11.1 K/uL    RBC 5.41 4.23 - 5.6 M/uL    Hemoglobin 15.3 13.6 - 17.2 g/dL    Hematocrit 45.5 41.1 - 50.3 %    MCV 84.1 82 - 102 FL    MCH 28.3 26.1 - 32.9 PG    MCHC 33.6 31.4 - 35.0 g/dL    RDW 13.1 11.9 - 14.6 %    Platelets 379 150 - 450 K/uL    MPV 9.3 (L) 9.4 - 12.3 FL    nRBC 0.00 0.0 - 0.2 K/uL    Differential Type AUTOMATED      Neutrophils % 51.7 43.0 - 78.0 %    Lymphocytes % 34.4 13.0 - 44.0 %    Monocytes % 8.2 4.0 - 12.0 %    Eosinophils % 4.8 0.5 - 7.8 %    Basophils % 0.5 0.0 - 2.0 %    Immature Granulocytes % 0.4 0.0 - 5.0 %    Neutrophils Absolute 5.47 1.70 - 8.20 K/UL    Lymphocytes Absolute 3.64 0.50 - 4.60 K/UL    Monocytes Absolute 0.87 0.10 - 1.30 K/UL    Eosinophils Absolute 0.51 0.00 - 0.80 K/UL    Basophils Absolute 0.05 0.00 - 0.20 K/UL    Immature

## 2025-06-19 ENCOUNTER — CARE COORDINATION (OUTPATIENT)
Dept: CARE COORDINATION | Facility: CLINIC | Age: 65
End: 2025-06-19

## 2025-06-19 NOTE — CARE COORDINATION
Care Transitions Note    Initial Call - Call within 2 business days of discharge: Yes    Patient Current Location:  Home: [address] [place] [id number]    Care Transition Nurse contacted the patient by telephone to perform post hospital discharge assessment, verified name and  as identifiers.  Provided introduction to self, and explanation of the Care Transition Nurse role.  Patient declines CTN services at this time. Patient prefers to schedule his follow up. CTN encouraged patient to reach out if needs arise.  Patient: Anam Thomson Patient : 1960   MRN: [id number]    Reason for Admission: Lightheadedness   Discharge Date: 25  RURS: No data recorded    Last Discharge Facility       Date Complaint Diagnosis Description Type Department Provider    25 Near-syncopal Episode Lightheadedness ... ED to Hosp-Admission (Discharged) (ADMITTED) [id number] Juan Diego Bojorquez MD; Roberto Vora...            Was this an external facility discharge? No    Additional needs identified to be addressed with provider   No needs identified             Follow Up Appointment:   Future Appointments         Provider Specialty Dept Phone    2025 4:00 PM [id number] [phone]    2025 4:15 PM Jcarlos Avalos MD Urology [phone]        Soila Dooley RN

## 2025-07-24 ENCOUNTER — LAB (OUTPATIENT)
Dept: UROLOGY | Age: 65
End: 2025-07-24

## 2025-07-24 DIAGNOSIS — R97.20 ELEVATED PSA: ICD-10-CM

## 2025-07-24 LAB — PSA SERPL-MCNC: 4.1 NG/ML (ref 0–4)

## 2025-07-31 ENCOUNTER — OFFICE VISIT (OUTPATIENT)
Dept: UROLOGY | Age: 65
End: 2025-07-31
Payer: MEDICARE

## 2025-07-31 DIAGNOSIS — N13.8 BPH WITH OBSTRUCTION/LOWER URINARY TRACT SYMPTOMS: ICD-10-CM

## 2025-07-31 DIAGNOSIS — N40.1 BPH WITH OBSTRUCTION/LOWER URINARY TRACT SYMPTOMS: ICD-10-CM

## 2025-07-31 DIAGNOSIS — R97.20 ELEVATED PSA: Primary | ICD-10-CM

## 2025-07-31 PROCEDURE — G8427 DOCREV CUR MEDS BY ELIG CLIN: HCPCS | Performed by: UROLOGY

## 2025-07-31 PROCEDURE — 1123F ACP DISCUSS/DSCN MKR DOCD: CPT | Performed by: UROLOGY

## 2025-07-31 PROCEDURE — 4004F PT TOBACCO SCREEN RCVD TLK: CPT | Performed by: UROLOGY

## 2025-07-31 PROCEDURE — 1160F RVW MEDS BY RX/DR IN RCRD: CPT | Performed by: UROLOGY

## 2025-07-31 PROCEDURE — G8417 CALC BMI ABV UP PARAM F/U: HCPCS | Performed by: UROLOGY

## 2025-07-31 PROCEDURE — 99213 OFFICE O/P EST LOW 20 MIN: CPT | Performed by: UROLOGY

## 2025-07-31 PROCEDURE — 3017F COLORECTAL CA SCREEN DOC REV: CPT | Performed by: UROLOGY

## 2025-07-31 PROCEDURE — 1159F MED LIST DOCD IN RCRD: CPT | Performed by: UROLOGY

## 2025-07-31 RX ORDER — FINASTERIDE 5 MG/1
5 TABLET, FILM COATED ORAL DAILY
Qty: 90 TABLET | Refills: 3 | Status: CANCELLED | OUTPATIENT
Start: 2025-07-31

## 2025-07-31 RX ORDER — FINASTERIDE 5 MG/1
5 TABLET, FILM COATED ORAL DAILY
Qty: 90 TABLET | Refills: 3 | Status: SHIPPED | OUTPATIENT
Start: 2025-07-31

## 2025-07-31 NOTE — PROGRESS NOTES
[facility]  [address] [place] [id number]  [phone]          Anam Thomson  : 1960    Chief Complaint   Patient presents with    Follow-up    Elevated PSA          HPI     Anam Thomson is a 64 y.o. male referred by Dr. Chiang in regards to elevated psa.  He denies family history of prostate cancer.  He has only minimal LUTS.  He was treated for prostatitis after 10/20 psa.  He denies gross hematuria.       He was lost to follow up from  to .       MRI 24 revealed a 67 gr prostate with 3 PIRADS 4 lesions.  Uronav prostate biopsy 24 revealed a 67 gram gland and all cores were benign.       Finasteride was begun in .       PSA: 10/26/20 9.0, 21 5.5,  6.7,  5.6, 3/24 7.2, finasteride,  4.9,  4.1    Past Medical History:   Diagnosis Date    Atrial fibrillation with rapid ventricular response (HCC) 2018    CKD (chronic kidney disease), stage III (HCC)     Elevated PSA     Hypercholesterolemia     Hypertension 2007    Kidney disease     Pyoderma gangrenosum (HCC) 2018; 24    Currently being treated for this condition - R foot     Past Surgical History:   Procedure Laterality Date    EP DEVICE PROCEDURE N/A 2024    Ablation A-fib w complete ep study performed by Antwon Barnett MD at [facility] CARDIAC CATH LAB    PROSTATE BIOPSY N/A 2024    MRI FUSION PROSTATE BIOPSY performed by Jcarlos Avalos MD at [facility] MAIN OR     Current Outpatient Medications   Medication Sig Dispense Refill    finasteride (PROSCAR) 5 MG tablet Take 1 tablet by mouth daily 90 tablet 3    apixaban (ELIQUIS) 5 MG TABS tablet Take 1 tablet by mouth 2 times daily 180 tablet 3    atorvastatin (LIPITOR) 20 MG tablet Take 1 tablet by mouth daily 90 tablet 3    metoprolol succinate (TOPROL XL) 100 MG extended release tablet Take 1 tablet by mouth daily 90 tablet 3    meloxicam (MOBIC) 15 MG tablet Take 1 tablet by mouth daily

## 2025-09-01 ENCOUNTER — HOSPITAL ENCOUNTER (EMERGENCY)
Age: 65
Discharge: HOME | End: 2025-09-02
Payer: MEDICARE

## 2025-09-01 DIAGNOSIS — L03.114 CELLULITIS OF LEFT UPPER EXTREMITY: Primary | ICD-10-CM

## 2025-09-01 LAB
ANION GAP SERPL CALC-SCNC: 11 MMOL/L (ref 7–16)
BASOPHILS # BLD: 0.05 K/UL (ref 0–0.2)
BASOPHILS NFR BLD: 0.4 % (ref 0–2)
BUN SERPL-MCNC: 24 MG/DL (ref 8–23)
CALCIUM SERPL-MCNC: 9.6 MG/DL (ref 8.8–10.2)
CHLORIDE SERPL-SCNC: 104 MMOL/L (ref 98–107)
CO2 SERPL-SCNC: 23 MMOL/L (ref 20–29)
CREAT SERPL-MCNC: 1.83 MG/DL (ref 0.8–1.3)
DIFFERENTIAL METHOD BLD: ABNORMAL
EOSINOPHIL # BLD: 0.51 K/UL (ref 0–0.8)
EOSINOPHIL NFR BLD: 4 % (ref 0.5–7.8)
ERYTHROCYTE [DISTWIDTH] IN BLOOD BY AUTOMATED COUNT: 13.5 % (ref 11.9–14.6)
GLUCOSE SERPL-MCNC: 104 MG/DL (ref 70–99)
HCT VFR BLD AUTO: 49.6 % (ref 41.1–50.3)
HGB BLD-MCNC: 16 G/DL (ref 13.6–17.2)
IMM GRANULOCYTES # BLD AUTO: 0.06 K/UL (ref 0–0.5)
IMM GRANULOCYTES NFR BLD AUTO: 0.5 % (ref 0–5)
LACTATE SERPL-SCNC: 1 MMOL/L (ref 0.5–2)
LYMPHOCYTES # BLD: 2.45 K/UL (ref 0.5–4.6)
LYMPHOCYTES NFR BLD: 19.4 % (ref 13–44)
MCH RBC QN AUTO: 28.3 PG (ref 26.1–32.9)
MCHC RBC AUTO-ENTMCNC: 32.3 G/DL (ref 31.4–35)
MCV RBC AUTO: 87.8 FL (ref 82–102)
MONOCYTES # BLD: 1.03 K/UL (ref 0.1–1.3)
MONOCYTES NFR BLD: 8.1 % (ref 4–12)
NEUTS SEG # BLD: 8.54 K/UL (ref 1.7–8.2)
NEUTS SEG NFR BLD: 67.6 % (ref 43–78)
NRBC # BLD: 0 K/UL (ref 0–0.2)
PLATELET # BLD AUTO: 422 K/UL (ref 150–450)
PMV BLD AUTO: 9.6 FL (ref 9.4–12.3)
POTASSIUM SERPL-SCNC: ABNORMAL MMOL/L (ref 3.5–5.1)
PROCALCITONIN SERPL-MCNC: 0.12 NG/ML (ref 0–0.1)
RBC # BLD AUTO: 5.65 M/UL (ref 4.23–5.6)
SODIUM SERPL-SCNC: 138 MMOL/L (ref 136–145)
WBC # BLD AUTO: 12.6 K/UL (ref 4.3–11.1)

## 2025-09-01 PROCEDURE — 85025 COMPLETE CBC W/AUTO DIFF WBC: CPT

## 2025-09-01 PROCEDURE — 83605 ASSAY OF LACTIC ACID: CPT

## 2025-09-01 PROCEDURE — 99283 EMERGENCY DEPT VISIT LOW MDM: CPT

## 2025-09-01 PROCEDURE — 80048 BASIC METABOLIC PNL TOTAL CA: CPT

## 2025-09-01 PROCEDURE — 84145 PROCALCITONIN (PCT): CPT

## 2025-09-01 ASSESSMENT — PAIN DESCRIPTION - LOCATION: LOCATION: WRIST

## 2025-09-01 ASSESSMENT — PAIN DESCRIPTION - DESCRIPTORS: DESCRIPTORS: ACHING

## 2025-09-01 ASSESSMENT — PAIN - FUNCTIONAL ASSESSMENT: PAIN_FUNCTIONAL_ASSESSMENT: 0-10

## 2025-09-01 ASSESSMENT — PAIN DESCRIPTION - ORIENTATION: ORIENTATION: LEFT

## 2025-09-01 ASSESSMENT — PAIN SCALES - GENERAL: PAINLEVEL_OUTOF10: 2

## 2025-09-02 VITALS
HEART RATE: 80 BPM | TEMPERATURE: 98.2 F | RESPIRATION RATE: 16 BRPM | DIASTOLIC BLOOD PRESSURE: 146 MMHG | OXYGEN SATURATION: 98 % | BODY MASS INDEX: 32.47 KG/M2 | WEIGHT: 245 LBS | HEIGHT: 73 IN | SYSTOLIC BLOOD PRESSURE: 180 MMHG

## 2025-09-02 PROCEDURE — 6370000000 HC RX 637 (ALT 250 FOR IP)

## 2025-09-02 RX ORDER — CLINDAMYCIN HYDROCHLORIDE 150 MG/1
450 CAPSULE ORAL 3 TIMES DAILY
Qty: 63 CAPSULE | Refills: 0 | Status: SHIPPED | OUTPATIENT
Start: 2025-09-02 | End: 2025-09-09

## 2025-09-02 RX ORDER — CLINDAMYCIN HYDROCHLORIDE 150 MG/1
450 CAPSULE ORAL
Status: COMPLETED | OUTPATIENT
Start: 2025-09-02 | End: 2025-09-02

## 2025-09-02 RX ADMIN — CLINDAMYCIN HYDROCHLORIDE 450 MG: 150 CAPSULE ORAL at 00:12

## 2025-09-03 ENCOUNTER — CARE COORDINATION (OUTPATIENT)
Dept: CARE COORDINATION | Facility: CLINIC | Age: 65
End: 2025-09-03

## 2025-09-04 ENCOUNTER — CARE COORDINATION (OUTPATIENT)
Dept: CARE COORDINATION | Facility: CLINIC | Age: 65
End: 2025-09-04

## 2025-09-05 ENCOUNTER — HOSPITAL ENCOUNTER (OUTPATIENT)
Dept: GENERAL RADIOLOGY | Age: 65
Discharge: HOME | End: 2025-09-05
Payer: MEDICARE

## 2025-09-05 ENCOUNTER — TELEPHONE (OUTPATIENT)
Dept: FAMILY MEDICINE CLINIC | Facility: CLINIC | Age: 65
End: 2025-09-05

## 2025-09-05 ENCOUNTER — OFFICE VISIT (OUTPATIENT)
Dept: FAMILY MEDICINE CLINIC | Facility: CLINIC | Age: 65
End: 2025-09-05

## 2025-09-05 VITALS
BODY MASS INDEX: 32.34 KG/M2 | DIASTOLIC BLOOD PRESSURE: 88 MMHG | HEART RATE: 75 BPM | SYSTOLIC BLOOD PRESSURE: 140 MMHG | HEIGHT: 73 IN | WEIGHT: 244 LBS

## 2025-09-05 DIAGNOSIS — L03.114 CELLULITIS OF LEFT WRIST: Primary | ICD-10-CM

## 2025-09-05 DIAGNOSIS — S61.502A OPEN WOUND OF LEFT WRIST, INITIAL ENCOUNTER: ICD-10-CM

## 2025-09-05 DIAGNOSIS — T14.90XA INJURY: ICD-10-CM

## 2025-09-05 DIAGNOSIS — Z23 NEED FOR VACCINATION: ICD-10-CM

## 2025-09-05 PROCEDURE — 73110 X-RAY EXAM OF WRIST: CPT

## 2025-09-05 RX ORDER — OLMESARTAN MEDOXOMIL AND HYDROCHLOROTHIAZIDE 40/25 40; 25 MG/1; MG/1
TABLET ORAL
COMMUNITY

## 2025-09-05 RX ORDER — CYCLOSPORINE 0.5 MG/ML
EMULSION OPHTHALMIC
COMMUNITY

## 2025-09-05 RX ORDER — PREDNISONE 20 MG/1
TABLET ORAL
COMMUNITY

## 2025-09-05 RX ORDER — LOSARTAN POTASSIUM 100 MG/1
TABLET ORAL
COMMUNITY

## 2025-09-05 RX ORDER — FLECAINIDE ACETATE 100 MG/1
TABLET ORAL
COMMUNITY

## 2025-09-05 RX ORDER — CLINDAMYCIN HYDROCHLORIDE 300 MG/1
300 CAPSULE ORAL 3 TIMES DAILY
Qty: 30 CAPSULE | Refills: 0 | Status: SHIPPED | OUTPATIENT
Start: 2025-09-05 | End: 2025-09-15

## (undated) DEVICE — INTRODUCER SHTH CARDIAGUIDE FCL20100

## (undated) DEVICE — PATCH REF EXT FOR CARTO 3 SYS (EA = 6 PACKS)

## (undated) DEVICE — HOLDER PRB URONAV

## (undated) DEVICE — CATHETER ABLAT 8FR L115CM 1-6-2MM SPC TIP 3.5MM FJ CRV

## (undated) DEVICE — 18G NG KIT WITH 96IN PROBE COVER (10 PK): Brand: SITE-RITE

## (undated) DEVICE — CATHETER EP 7FR L115CM 2-8-2MM SPC TIP 2MM 10 ELECTRD F L

## (undated) DEVICE — MAX-CORE® DISPOSABLE CORE BIOPSY INSTRUMENT, 18G X 25CM: Brand: MAX-CORE

## (undated) DEVICE — CATHETER REPROC ELCTRPHSLGY 10FR DIA 90CML DGNSTC ULTRSND F

## (undated) DEVICE — DRAPE TWL SURG 16X26IN BLU ORB04] ALLCARE INC]

## (undated) DEVICE — STERILE PACKED. BORE DIAMETER 1.6 MM; ANGLE OF INSERTION 19° TO THE LONG AXIS OF THE TRANSDUCER: Brand: SINGLE-USE BIPLANE BIOPSY GUIDE

## (undated) DEVICE — PRESSURE MONITORING SET: Brand: TRUWAVE

## (undated) DEVICE — CATHETER MAP F CRV 2-2-2-2-2 MM SPC PERSEID OCTARAY

## (undated) DEVICE — PINNACLE INTRODUCER SHEATH: Brand: PINNACLE

## (undated) DEVICE — JELLY LUBRICATING 10GM PREFIL SYR LUBE

## (undated) DEVICE — SYSTEM CLOSURE 6-12 FR VEN VASC VASCADE MVP

## (undated) DEVICE — TUBING PMP FOR CARTO SYS SMARTABLATE

## (undated) DEVICE — CATHETER EP 7FR L115CM 2-8-2MM SPC TIP 2MM 10 ELECTRD FJ

## (undated) DEVICE — PINNACLE TIF INTRODUCER SHEATH: Brand: PINNACLE

## (undated) DEVICE — Device: Brand: NRG TRANSSEPTAL NEEDLE

## (undated) DEVICE — STERILE (15.2 TAPERED TO 7.6 X 183CM) POLYETHYLENE ACCORDION-FOLDED COVER FOR USE WITH SIEMENS ACUNAV ULTRASOUND CATHETER FAMILY CONNECTOR: Brand: SWIFTLINK TRANSDUCER COVER